# Patient Record
Sex: FEMALE | Race: OTHER | HISPANIC OR LATINO
[De-identification: names, ages, dates, MRNs, and addresses within clinical notes are randomized per-mention and may not be internally consistent; named-entity substitution may affect disease eponyms.]

---

## 2021-04-22 ENCOUNTER — APPOINTMENT (OUTPATIENT)
Dept: OBGYN | Facility: CLINIC | Age: 31
End: 2021-04-22
Payer: MEDICAID

## 2021-04-22 VITALS
DIASTOLIC BLOOD PRESSURE: 83 MMHG | BODY MASS INDEX: 35.87 KG/M2 | WEIGHT: 190 LBS | HEIGHT: 61 IN | SYSTOLIC BLOOD PRESSURE: 114 MMHG | HEART RATE: 92 BPM | TEMPERATURE: 97.9 F | OXYGEN SATURATION: 98 %

## 2021-04-22 DIAGNOSIS — Z82.49 FAMILY HISTORY OF ISCHEMIC HEART DISEASE AND OTHER DISEASES OF THE CIRCULATORY SYSTEM: ICD-10-CM

## 2021-04-22 DIAGNOSIS — N20.0 CALCULUS OF KIDNEY: ICD-10-CM

## 2021-04-22 DIAGNOSIS — K80.20 CALCULUS OF GALLBLADDER W/OUT CHOLECYSTITIS W/OUT OBSTRUCTION: ICD-10-CM

## 2021-04-22 DIAGNOSIS — Z78.9 OTHER SPECIFIED HEALTH STATUS: ICD-10-CM

## 2021-04-22 DIAGNOSIS — R10.2 PELVIC AND PERINEAL PAIN: ICD-10-CM

## 2021-04-22 PROCEDURE — 99072 ADDL SUPL MATRL&STAF TM PHE: CPT

## 2021-04-22 PROCEDURE — 99203 OFFICE O/P NEW LOW 30 MIN: CPT

## 2021-04-22 NOTE — HISTORY OF PRESENT ILLNESS
[Localized] : localized [TextBox_10] : pain inher abdomen radiates to back [Frequency: Q ___ days] : menstrual periods occur approximately every [unfilled] days [Menarche Age: ____] : age at menarche was [unfilled] [Menstrual Cramps] : menstrual cramps [FreeTextEntry1] : 04/13/2021 [Currently Active] : currently active [Men] : men [Vaginal] : vaginal [No] : No

## 2021-04-23 LAB
ANION GAP SERPL CALC-SCNC: 13 MMOL/L
APPEARANCE: CLEAR
BACTERIA: NEGATIVE
BASOPHILS # BLD AUTO: 0.08 K/UL
BASOPHILS NFR BLD AUTO: 0.7 %
BILIRUBIN URINE: NEGATIVE
BLOOD URINE: NORMAL
BUN SERPL-MCNC: 13 MG/DL
C TRACH RRNA SPEC QL NAA+PROBE: NOT DETECTED
CALCIUM SERPL-MCNC: 10.2 MG/DL
CANCER AG125 SERPL-ACNC: 46 U/ML
CEA SERPL-MCNC: 1.1 NG/ML
CHLORIDE SERPL-SCNC: 101 MMOL/L
CO2 SERPL-SCNC: 24 MMOL/L
COLOR: NORMAL
CREAT SERPL-MCNC: 0.85 MG/DL
DHEA-S SERPL-MCNC: 397 UG/DL
EOSINOPHIL # BLD AUTO: 0.19 K/UL
EOSINOPHIL NFR BLD AUTO: 1.6 %
FSH SERPL-MCNC: 5.2 IU/L
GLUCOSE QUALITATIVE U: NEGATIVE
GLUCOSE SERPL-MCNC: 84 MG/DL
HCT VFR BLD CALC: 42.1 %
HGB BLD-MCNC: 13.2 G/DL
HYALINE CASTS: 1 /LPF
IMM GRANULOCYTES NFR BLD AUTO: 0.3 %
KETONES URINE: NEGATIVE
LEUKOCYTE ESTERASE URINE: NEGATIVE
LH SERPL-ACNC: 6.4 IU/L
LYMPHOCYTES # BLD AUTO: 4.12 K/UL
LYMPHOCYTES NFR BLD AUTO: 35.5 %
MAN DIFF?: NORMAL
MCHC RBC-ENTMCNC: 28.2 PG
MCHC RBC-ENTMCNC: 31.4 GM/DL
MCV RBC AUTO: 90 FL
MICROSCOPIC-UA: NORMAL
MONOCYTES # BLD AUTO: 0.59 K/UL
MONOCYTES NFR BLD AUTO: 5.1 %
N GONORRHOEA RRNA SPEC QL NAA+PROBE: NOT DETECTED
NEUTROPHILS # BLD AUTO: 6.58 K/UL
NEUTROPHILS NFR BLD AUTO: 56.8 %
NITRITE URINE: NEGATIVE
PH URINE: 6
PLATELET # BLD AUTO: 388 K/UL
POTASSIUM SERPL-SCNC: 4.2 MMOL/L
PROLACTIN SERPL-MCNC: 8.2 NG/ML
PROTEIN URINE: NORMAL
RBC # BLD: 4.68 M/UL
RBC # FLD: 13.1 %
RED BLOOD CELLS URINE: 2 /HPF
SODIUM SERPL-SCNC: 138 MMOL/L
SOURCE TP AMPLIFICATION: NORMAL
SPECIFIC GRAVITY URINE: 1.02
SQUAMOUS EPITHELIAL CELLS: 2 /HPF
TSH SERPL-ACNC: 0.84 UIU/ML
UROBILINOGEN URINE: NORMAL
WBC # FLD AUTO: 11.6 K/UL
WHITE BLOOD CELLS URINE: 3 /HPF

## 2021-04-24 LAB — BACTERIA UR CULT: NORMAL

## 2021-04-26 DIAGNOSIS — J45.20 MILD INTERMITTENT ASTHMA, UNCOMPLICATED: ICD-10-CM

## 2021-04-26 RX ORDER — AMITRIPTYLINE HYDROCHLORIDE 25 MG/1
25 TABLET, FILM COATED ORAL
Qty: 30 | Refills: 0 | Status: COMPLETED | COMMUNITY
Start: 2021-03-03

## 2021-04-26 RX ORDER — SULFAMETHOXAZOLE AND TRIMETHOPRIM 800; 160 MG/1; MG/1
800-160 TABLET ORAL
Qty: 14 | Refills: 0 | Status: COMPLETED | COMMUNITY
Start: 2020-12-15

## 2021-04-26 RX ORDER — NORETHINDRONE 0.35 MG/1
0.35 TABLET ORAL
Qty: 28 | Refills: 0 | Status: COMPLETED | COMMUNITY
Start: 2021-03-03

## 2021-04-26 RX ORDER — HYDROCORTISONE 25 MG/G
2.5 CREAM TOPICAL
Qty: 30 | Refills: 0 | Status: COMPLETED | COMMUNITY
Start: 2021-01-20

## 2021-04-26 RX ORDER — CIPROFLOXACIN HYDROCHLORIDE 500 MG/1
500 TABLET, FILM COATED ORAL
Qty: 14 | Refills: 0 | Status: COMPLETED | COMMUNITY
Start: 2020-12-17

## 2021-04-28 LAB
CYTOLOGY CVX/VAG DOC THIN PREP: NORMAL
TESTOST BND SERPL-MCNC: 3.6 PG/ML
TESTOST SERPL-MCNC: 25.2 NG/DL

## 2021-05-07 ENCOUNTER — APPOINTMENT (OUTPATIENT)
Dept: ULTRASOUND IMAGING | Facility: HOSPITAL | Age: 31
End: 2021-05-07
Payer: MEDICAID

## 2021-05-07 ENCOUNTER — TRANSCRIPTION ENCOUNTER (OUTPATIENT)
Age: 31
End: 2021-05-07

## 2021-05-07 ENCOUNTER — OUTPATIENT (OUTPATIENT)
Dept: OUTPATIENT SERVICES | Facility: HOSPITAL | Age: 31
LOS: 1 days | End: 2021-05-07
Payer: MEDICAID

## 2021-05-07 DIAGNOSIS — R10.2 PELVIC AND PERINEAL PAIN: ICD-10-CM

## 2021-05-07 PROCEDURE — 76856 US EXAM PELVIC COMPLETE: CPT | Mod: 26

## 2021-05-07 PROCEDURE — 76830 TRANSVAGINAL US NON-OB: CPT

## 2021-05-07 PROCEDURE — 76856 US EXAM PELVIC COMPLETE: CPT

## 2021-05-07 PROCEDURE — 76830 TRANSVAGINAL US NON-OB: CPT | Mod: 26

## 2021-05-10 ENCOUNTER — TRANSCRIPTION ENCOUNTER (OUTPATIENT)
Age: 31
End: 2021-05-10

## 2021-05-12 ENCOUNTER — OUTPATIENT (OUTPATIENT)
Dept: OUTPATIENT SERVICES | Facility: HOSPITAL | Age: 31
LOS: 1 days | End: 2021-05-12
Payer: MEDICAID

## 2021-05-12 ENCOUNTER — APPOINTMENT (OUTPATIENT)
Dept: MRI IMAGING | Facility: HOSPITAL | Age: 31
End: 2021-05-12
Payer: MEDICAID

## 2021-05-12 DIAGNOSIS — N83.201 UNSPECIFIED OVARIAN CYST, RIGHT SIDE: ICD-10-CM

## 2021-05-12 PROCEDURE — 72197 MRI PELVIS W/O & W/DYE: CPT | Mod: 26

## 2021-05-12 PROCEDURE — 72197 MRI PELVIS W/O & W/DYE: CPT

## 2021-05-26 ENCOUNTER — APPOINTMENT (OUTPATIENT)
Dept: GYNECOLOGIC ONCOLOGY | Facility: CLINIC | Age: 31
End: 2021-05-26
Payer: MEDICAID

## 2021-05-26 VITALS
DIASTOLIC BLOOD PRESSURE: 84 MMHG | HEIGHT: 61 IN | HEART RATE: 92 BPM | WEIGHT: 198.25 LBS | OXYGEN SATURATION: 97 % | BODY MASS INDEX: 37.43 KG/M2 | SYSTOLIC BLOOD PRESSURE: 119 MMHG

## 2021-05-26 DIAGNOSIS — N83.299 OTHER OVARIAN CYST, UNSPECIFIED SIDE: ICD-10-CM

## 2021-05-26 DIAGNOSIS — Z87.898 PERSONAL HISTORY OF OTHER SPECIFIED CONDITIONS: ICD-10-CM

## 2021-05-26 DIAGNOSIS — Z80.8 FAMILY HISTORY OF MALIGNANT NEOPLASM OF OTHER ORGANS OR SYSTEMS: ICD-10-CM

## 2021-05-26 DIAGNOSIS — Z80.7 FAMILY HISTORY OF OTHER MALIGNANT NEOPLASMS OF LYMPHOID, HEMATOPOIETIC AND RELATED TISSUES: ICD-10-CM

## 2021-05-26 DIAGNOSIS — Z76.89 PERSONS ENCOUNTERING HEALTH SERVICES IN OTHER SPECIFIED CIRCUMSTANCES: ICD-10-CM

## 2021-05-26 DIAGNOSIS — Z86.69 PERSONAL HISTORY OF OTHER DISEASES OF THE NERVOUS SYSTEM AND SENSE ORGANS: ICD-10-CM

## 2021-05-26 PROCEDURE — 99205 OFFICE O/P NEW HI 60 MIN: CPT

## 2021-06-08 ENCOUNTER — OUTPATIENT (OUTPATIENT)
Dept: OUTPATIENT SERVICES | Facility: HOSPITAL | Age: 31
LOS: 1 days | End: 2021-06-08

## 2021-06-08 VITALS
HEART RATE: 90 BPM | WEIGHT: 199.96 LBS | HEIGHT: 61 IN | DIASTOLIC BLOOD PRESSURE: 88 MMHG | OXYGEN SATURATION: 99 % | TEMPERATURE: 99 F | SYSTOLIC BLOOD PRESSURE: 125 MMHG | RESPIRATION RATE: 16 BRPM

## 2021-06-08 DIAGNOSIS — N83.201 UNSPECIFIED OVARIAN CYST, RIGHT SIDE: ICD-10-CM

## 2021-06-08 LAB
ANION GAP SERPL CALC-SCNC: 11 MMOL/L — SIGNIFICANT CHANGE UP (ref 7–14)
BLD GP AB SCN SERPL QL: NEGATIVE — SIGNIFICANT CHANGE UP
BUN SERPL-MCNC: 11 MG/DL — SIGNIFICANT CHANGE UP (ref 7–23)
CALCIUM SERPL-MCNC: 9.3 MG/DL — SIGNIFICANT CHANGE UP (ref 8.4–10.5)
CHLORIDE SERPL-SCNC: 102 MMOL/L — SIGNIFICANT CHANGE UP (ref 98–107)
CO2 SERPL-SCNC: 26 MMOL/L — SIGNIFICANT CHANGE UP (ref 22–31)
CREAT SERPL-MCNC: 0.79 MG/DL — SIGNIFICANT CHANGE UP (ref 0.5–1.3)
GLUCOSE SERPL-MCNC: 69 MG/DL — LOW (ref 70–99)
HCG UR QL: NEGATIVE — SIGNIFICANT CHANGE UP
HCT VFR BLD CALC: 37.2 % — SIGNIFICANT CHANGE UP (ref 34.5–45)
HGB BLD-MCNC: 11.7 G/DL — SIGNIFICANT CHANGE UP (ref 11.5–15.5)
MCHC RBC-ENTMCNC: 27.8 PG — SIGNIFICANT CHANGE UP (ref 27–34)
MCHC RBC-ENTMCNC: 31.5 GM/DL — LOW (ref 32–36)
MCV RBC AUTO: 88.4 FL — SIGNIFICANT CHANGE UP (ref 80–100)
NRBC # BLD: 0 /100 WBCS — SIGNIFICANT CHANGE UP
NRBC # FLD: 0 K/UL — SIGNIFICANT CHANGE UP
PLATELET # BLD AUTO: 297 K/UL — SIGNIFICANT CHANGE UP (ref 150–400)
POTASSIUM SERPL-MCNC: 4 MMOL/L — SIGNIFICANT CHANGE UP (ref 3.5–5.3)
POTASSIUM SERPL-SCNC: 4 MMOL/L — SIGNIFICANT CHANGE UP (ref 3.5–5.3)
RBC # BLD: 4.21 M/UL — SIGNIFICANT CHANGE UP (ref 3.8–5.2)
RBC # FLD: 12.6 % — SIGNIFICANT CHANGE UP (ref 10.3–14.5)
RH IG SCN BLD-IMP: POSITIVE — SIGNIFICANT CHANGE UP
SODIUM SERPL-SCNC: 139 MMOL/L — SIGNIFICANT CHANGE UP (ref 135–145)
WBC # BLD: 8.61 K/UL — SIGNIFICANT CHANGE UP (ref 3.8–10.5)
WBC # FLD AUTO: 8.61 K/UL — SIGNIFICANT CHANGE UP (ref 3.8–10.5)

## 2021-06-08 NOTE — H&P PST ADULT - ATTENDING COMMENTS
patient seen and evaluated, plan for RA LSC Ovarian cystectomy, possible oophorectomy, possible staging , all indicated procedures  discussed risks including bleeding, infection, injury to bowel/bladder/vessels/nerves/ureters, risks of blood transfusion, reoperation, ICU admission  all questions answered

## 2021-06-08 NOTE — H&P PST ADULT - NSANTHOSAYNRD_GEN_A_CORE
No. WILLY screening performed.  STOP BANG Legend: 0-2 = LOW Risk; 3-4 = INTERMEDIATE Risk; 5-8 = HIGH Risk

## 2021-06-08 NOTE — H&P PST ADULT - NSICDXFAMILYHX_GEN_ALL_CORE_FT
FAMILY HISTORY:  Father  Still living? Unknown  FH: HTN (hypertension), Age at diagnosis: Age Unknown    Mother  Still living? Yes, Estimated age: Age Unknown  FH: HTN (hypertension), Age at diagnosis: Age Unknown    Sibling  Still living? Unknown  FH: sickle cell anemia, Age at diagnosis: Age Unknown

## 2021-06-08 NOTE — H&P PST ADULT - ASSESSMENT
Problem: unspecified ovarian cyst, right side   Assessment and Plan: Pt is tentatively scheduled for robotic assisted bilateral ovarian cystectomy possible unilateral salpingo oophorectomy staging for 6/15/21. Pre-op instructions provided. Pt given verbal and written instructions with teach back on chlorhexidine shampoo and pepcid. Urine cup provided for day of procedure pregnancy test.  Hospital visitation policy provided. Pt strongly advised to follow up with surgeon to discuss COVID testing requirements prior to procedure. Pt verbalized understanding with return demonstration.

## 2021-06-08 NOTE — H&P PST ADULT - HISTORY OF PRESENT ILLNESS
31 year old female presents to presurgical testing with diagnosis of unspecified ovarian cyst, right side scheduled for robotic assisted bilateral ovarian cystectomy possible unilateral salpingo oophorectomy staging. Pt with a right complex ovarian cyst and a left ovarian cyst, complaining of pelvic pain.

## 2021-06-14 ENCOUNTER — TRANSCRIPTION ENCOUNTER (OUTPATIENT)
Age: 31
End: 2021-06-14

## 2021-06-14 NOTE — ASU PATIENT PROFILE, ADULT - INTERNATIONAL TRAVEL
No Lydia Reynolds  CARDIOLOGY  [address] [place] [address]  Phone: [phone]  Fax: [phone]  Follow Up Time:     Tyrone Madrigal)  Gastroenterology; Internal Medicine  [address] [place] [address]  Phone: [phone]  Fax: [phone]  Follow Up Time:     Chip Layton  GASTROENTEROLOGY  [address] [place] [address]  Phone: [phone]  Fax: [phone]  Follow Up Time:

## 2021-06-15 ENCOUNTER — RESULT REVIEW (OUTPATIENT)
Age: 31
End: 2021-06-15

## 2021-06-15 ENCOUNTER — OUTPATIENT (OUTPATIENT)
Dept: OUTPATIENT SERVICES | Facility: HOSPITAL | Age: 31
LOS: 1 days | Discharge: HOME | End: 2021-06-15
Payer: MEDICAID

## 2021-06-15 ENCOUNTER — APPOINTMENT (OUTPATIENT)
Dept: GYNECOLOGIC ONCOLOGY | Facility: HOSPITAL | Age: 31
End: 2021-06-15

## 2021-06-15 VITALS
HEIGHT: 61 IN | DIASTOLIC BLOOD PRESSURE: 85 MMHG | SYSTOLIC BLOOD PRESSURE: 141 MMHG | OXYGEN SATURATION: 96 % | RESPIRATION RATE: 16 BRPM | HEART RATE: 89 BPM | TEMPERATURE: 98 F | WEIGHT: 199.96 LBS

## 2021-06-15 VITALS
OXYGEN SATURATION: 100 % | TEMPERATURE: 98 F | DIASTOLIC BLOOD PRESSURE: 82 MMHG | RESPIRATION RATE: 15 BRPM | SYSTOLIC BLOOD PRESSURE: 126 MMHG | HEART RATE: 98 BPM

## 2021-06-15 DIAGNOSIS — N83.201 UNSPECIFIED OVARIAN CYST, RIGHT SIDE: ICD-10-CM

## 2021-06-15 PROBLEM — J45.909 UNSPECIFIED ASTHMA, UNCOMPLICATED: Chronic | Status: ACTIVE | Noted: 2021-06-08

## 2021-06-15 PROBLEM — N20.0 CALCULUS OF KIDNEY: Chronic | Status: ACTIVE | Noted: 2021-06-08

## 2021-06-15 PROBLEM — R42 DIZZINESS AND GIDDINESS: Chronic | Status: ACTIVE | Noted: 2021-06-08

## 2021-06-15 PROBLEM — G43.909 MIGRAINE, UNSPECIFIED, NOT INTRACTABLE, WITHOUT STATUS MIGRAINOSUS: Chronic | Status: ACTIVE | Noted: 2021-06-08

## 2021-06-15 LAB — HCG UR QL: NEGATIVE — SIGNIFICANT CHANGE UP

## 2021-06-15 PROCEDURE — 58661 LAPAROSCOPY REMOVE ADNEXA: CPT | Mod: 22

## 2021-06-15 PROCEDURE — 88305 TISSUE EXAM BY PATHOLOGIST: CPT | Mod: 26

## 2021-06-15 PROCEDURE — 88305 TISSUE EXAM BY PATHOLOGIST: CPT | Mod: 26,59

## 2021-06-15 PROCEDURE — S2900 ROBOTIC SURGICAL SYSTEM: CPT | Mod: NC

## 2021-06-15 PROCEDURE — 88342 IMHCHEM/IMCYTCHM 1ST ANTB: CPT | Mod: 26

## 2021-06-15 PROCEDURE — 88341 IMHCHEM/IMCYTCHM EA ADD ANTB: CPT | Mod: 26

## 2021-06-15 PROCEDURE — 88112 CYTOPATH CELL ENHANCE TECH: CPT | Mod: 26

## 2021-06-15 RX ORDER — FENTANYL CITRATE 50 UG/ML
50 INJECTION INTRAVENOUS ONCE
Refills: 0 | Status: DISCONTINUED | OUTPATIENT
Start: 2021-06-15 | End: 2021-06-15

## 2021-06-15 RX ORDER — ONDANSETRON 8 MG/1
4 TABLET, FILM COATED ORAL ONCE
Refills: 0 | Status: DISCONTINUED | OUTPATIENT
Start: 2021-06-15 | End: 2021-06-29

## 2021-06-15 RX ORDER — SODIUM CHLORIDE 9 MG/ML
1000 INJECTION, SOLUTION INTRAVENOUS
Refills: 0 | Status: DISCONTINUED | OUTPATIENT
Start: 2021-06-15 | End: 2021-06-15

## 2021-06-15 RX ORDER — OXYCODONE HYDROCHLORIDE 5 MG/1
5 TABLET ORAL ONCE
Refills: 0 | Status: DISCONTINUED | OUTPATIENT
Start: 2021-06-15 | End: 2021-06-15

## 2021-06-15 RX ORDER — OXYCODONE HYDROCHLORIDE 5 MG/1
1 TABLET ORAL
Qty: 5 | Refills: 0
Start: 2021-06-15

## 2021-06-15 RX ORDER — SODIUM CHLORIDE 9 MG/ML
1000 INJECTION, SOLUTION INTRAVENOUS
Refills: 0 | Status: DISCONTINUED | OUTPATIENT
Start: 2021-06-15 | End: 2021-06-29

## 2021-06-15 RX ORDER — FENTANYL CITRATE 50 UG/ML
25 INJECTION INTRAVENOUS
Refills: 0 | Status: DISCONTINUED | OUTPATIENT
Start: 2021-06-15 | End: 2021-06-15

## 2021-06-15 RX ADMIN — OXYCODONE HYDROCHLORIDE 5 MILLIGRAM(S): 5 TABLET ORAL at 17:20

## 2021-06-15 RX ADMIN — SODIUM CHLORIDE 125 MILLILITER(S): 9 INJECTION, SOLUTION INTRAVENOUS at 16:20

## 2021-06-15 RX ADMIN — FENTANYL CITRATE 50 MICROGRAM(S): 50 INJECTION INTRAVENOUS at 17:10

## 2021-06-15 RX ADMIN — FENTANYL CITRATE 50 MICROGRAM(S): 50 INJECTION INTRAVENOUS at 16:55

## 2021-06-15 RX ADMIN — OXYCODONE HYDROCHLORIDE 5 MILLIGRAM(S): 5 TABLET ORAL at 17:50

## 2021-06-15 NOTE — PROGRESS NOTE ADULT - SUBJECTIVE AND OBJECTIVE BOX
PA POST-OP CHECK GYN ONCOLOGY NOTE    Patient seen and examined at bedside in PACU. Patient awake and resting comfortably. Reports pain is under control at this time with current regimen. Otherwise doing well. Patient denies fever, chills, chest pain, SOB, nausea, vomiting, lightheadedness, and dizziness.      T(F): 98.2 (06-15-21 @ 18:00), Max: 98.2 (06-15-21 @ 18:00)  HR: 90 (06-15-21 @ 18:15) (88 - 101)  BP: 112/67 (06-15-21 @ 18:15) (107/68 - 141/85)  RR: 18 (06-15-21 @ 18:15) (11 - 20)  SpO2: 96% (06-15-21 @ 18:15) (93% - 100%)      I&O's Detail    15 Luis Manuel 2021 07:01  -  15 Luis Manuel 2021 18:26  --------------------------------------------------------  IN:    Lactated Ringers: 375 mL    Oral Fluid: 240 mL  Total IN: 615 mL    OUT:  Total OUT: 0 mL    Total NET: 615 mL      Physical Exam:  General: NAD  Chest: CTA b/l  Heart: s1s2  Abdomen: Soft, appropriately tender to palpation  Incisional site: clean, dry, intact   Extremities: No swelling or calf tenderness bilaterally      MEDICATIONS  (STANDING):  lactated ringers. 1000 milliLiter(s) (125 mL/Hr) IV Continuous <Continuous>    MEDICATIONS  (PRN):  fentaNYL    Injectable 25 MICROGram(s) IV Push every 5 minutes PRN Moderate Pain (4 - 6)  ondansetron Injectable 4 milliGRAM(s) IV Push once PRN Nausea and/or Vomiting      A/P: This is a 31y female, POD#0, s/p RA LSO and right cystectomy. Patient is stable and doing well post operatively.    Plan:  DTV by 11:30p  IV Hydration with LR at 125mL/hr  Pain management as needed  Encourage incentive spirometer use  Clears liquid diet and advance diet as tolerated  Out of bed with assist as tolerated  Evaluate for discharge when tolerates diet, voids, ambulates, and VSS    Patient's case and post-op condition discussed with Gyn/Onc team. Above plans as per our discussion.  Spectra #[id number]

## 2021-06-15 NOTE — ASU DISCHARGE PLAN (ADULT/PEDIATRIC) - CARE PROVIDER_API CALL
Magalys Fraire  GYNECOLOGIC ONCOLOGY  [address] [place] [address]  Phone: [phone]  Fax: [phone]  Follow Up Time:

## 2021-06-15 NOTE — ASU DISCHARGE PLAN (ADULT/PEDIATRIC) - ASU DC SPECIAL INSTRUCTIONSFT
Postoperative Instructions      Pain control     For pain control, take the followin. Motrin 600mg every 6 hours, take with food  2. Add Tylenol 975 every 6 hours, alternated with Motrin  3. Oxycodone 5mg every 6 hours as needed for severe pain     Motrin and Tylenol can be obtained over the counter.         Postoperative restrictions   Do not drive or make important decisions for 24 hours after anesthesia. No lifting anything heavier than 15 lbs, no strenuous exercise until cleared by Dr. Fraire.       Vaginal bleeding   Spotting per vagina is normal in first few day after surgery. If you are soaking 1 pad per hour, that is not normal and you should notify Dr. Fraire's office and seek medical attention right away.      Signs of Infection  Call the office and/or come to the emergency room for any of the following: foul smelling vaginal discharge, fever over 100.4F, abdominal pain or cramping that does not get better with over the counter medications, nausea/vomiting (especially if you become unable to tolerate oral intake), inability to urinate. Any of these could be signs that you may be developing an infection requiring antibiotics.      Follow Up  Please follow-up with Dr Fraire on 2021 at 10:30 am.

## 2021-06-15 NOTE — ASU DISCHARGE PLAN (ADULT/PEDIATRIC) - NURSING INSTRUCTIONS
DO NOT take any Tylenol (Acetaminophen) or narcotics containing Tylenol until after  9:30pm . You received Tylenol during your operation and it can cause damage to your liver if too much is taken within a 24 hour time period. No Ibuprofen/ Motrin/ Advil until after 9:45pm.

## 2021-06-15 NOTE — BRIEF OPERATIVE NOTE - NSICDXBRIEFPROCEDURE_GEN_ALL_CORE_FT
PROCEDURES:  Salpingoopherectomy, laparoscopic, robot-assisted 15-Luis Manuel-2021 16:17:50  Jessi Fisher  Robot-assisted laparoscopic right ovarian cystectomy 15-Luis Manuel-2021 16:18:18  Jessi Fisher

## 2021-06-17 LAB — SURGICAL PATHOLOGY STUDY: SIGNIFICANT CHANGE UP

## 2021-06-21 ENCOUNTER — NON-APPOINTMENT (OUTPATIENT)
Age: 31
End: 2021-06-21

## 2021-06-22 ENCOUNTER — APPOINTMENT (OUTPATIENT)
Dept: HUMAN REPRODUCTION | Facility: CLINIC | Age: 31
End: 2021-06-22
Payer: COMMERCIAL

## 2021-06-22 LAB — NON-GYNECOLOGICAL CYTOLOGY STUDY: SIGNIFICANT CHANGE UP

## 2021-06-22 PROCEDURE — 99202 OFFICE O/P NEW SF 15 MIN: CPT | Mod: NC,95

## 2021-06-23 ENCOUNTER — APPOINTMENT (OUTPATIENT)
Dept: GYNECOLOGIC ONCOLOGY | Facility: CLINIC | Age: 31
End: 2021-06-23
Payer: MEDICAID

## 2021-06-23 ENCOUNTER — NON-APPOINTMENT (OUTPATIENT)
Age: 31
End: 2021-06-23

## 2021-06-23 VITALS
DIASTOLIC BLOOD PRESSURE: 83 MMHG | SYSTOLIC BLOOD PRESSURE: 118 MMHG | TEMPERATURE: 97.8 F | HEART RATE: 109 BPM | HEIGHT: 61 IN | BODY MASS INDEX: 3.79 KG/M2 | WEIGHT: 20.06 LBS

## 2021-06-23 DIAGNOSIS — Z71.89 OTHER SPECIFIED COUNSELING: ICD-10-CM

## 2021-06-23 PROCEDURE — 99215 OFFICE O/P EST HI 40 MIN: CPT | Mod: 24

## 2021-06-23 RX ORDER — AZELASTINE HYDROCHLORIDE 137 UG/1
0.1 SPRAY, METERED NASAL
Qty: 30 | Refills: 0 | Status: DISCONTINUED | COMMUNITY
Start: 2021-03-26 | End: 2021-06-23

## 2021-06-23 RX ORDER — FLUTICASONE PROPIONATE 50 MCG
50 SPRAY, SUSPENSION NASAL
Refills: 0 | Status: DISCONTINUED | COMMUNITY
End: 2021-06-23

## 2021-06-23 RX ORDER — METFORMIN HYDROCHLORIDE 625 MG/1
TABLET ORAL
Refills: 0 | Status: DISCONTINUED | COMMUNITY
End: 2021-06-23

## 2021-06-23 NOTE — ASSESSMENT
[FreeTextEntry1] : Discussed pathology results with patient, boyfriend, and dad presenting today. 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 Additionally, had input from CARLIE, Med Onc, Gyn Onc at [facility] system conference. Patient has low grade ovarian cancer, with + spread to omentum based on intraop findigns and + findings on the uterus, peritoneum, with some components indicated borderline/noninvasive components. Given findings, recommend surgical debulking surgery- which is c/w completion hysterectomy, RSO, prior LSO and omentectomy, debulking of disease tissues. Patient and family understand. Per Dr. Ceja - given advanced disease and low grade tumor b/l, including the residual right ovary, oocyte preservation is not an option. Stimulation is not an option and neither is ovarian conservation - standard management is BSO - especially since this residual right ovary is c/w low grade tumor as well. Unanimous  recommendation to proceed with completion surgery/hysterectomy RSO\par patient and family understand. She is willing to consider adoption in the future, she understands also risks of menopause at this age, heart health, bone health.\par We discussed need for future chemotherapy after surgery.\par Patient is aware and would like to proceed with surgery\par She inquired about need for 2nd opinion. I encouraged her to have a 2nd opinion if she feels that is most comfortable for her - especially given our plan for sterility\par At this time, surgery is scheduled for 7/6\par \par Planned for exploratory laparotomy, total abdominal hysterectomy, Right salpingo-oophorectomy, possible lymph node debulking, omentectomy, possible bowel resection/reanastomosis, debulking, all indicated procedures\par \par Risks of surgery discussed including bleeding, infection, injury to bowel/bladder/vessels/nerves/ureters/ risk of blood transfusion - pt accepts blood, risk of ICU admission, reoperation, anesthesia risk\par \par Benefits including staging, treatment discussed with patient, including possible need to abort surgery if debulking cannot be achieved\par Patient aware that she may need further treatment including chemotherapy \par All questions answered\par \par PST/Covid vaccinated\par CT tomorrow C/A/P\par \par

## 2021-06-23 NOTE — REASON FOR VISIT
[Post Op] : post op visit [de-identified] : 6/15/21 [de-identified] : RA LSO, R cystectomy, peritoneal biopsies [de-identified] : Patient doing well, no n/v/cp/sob, eric reg diet, voiding freely + BMs\par Presents to discuss pathology results and next steps.

## 2021-06-23 NOTE — DISCUSSION/SUMMARY
[Clean] : was clean [Dry] : was dry [Intact] : was intact [Ecchymosis] : was not ecchymotic [Erythema] : was not erythematous [Seroma] : had no seroma [None] : had no drainage [Normal Skin] : normal appearance [Firm] : soft [Tender] : nontender [Abnormal Bowel Sounds] : normal bowel sounds [Rebound] : no rebound tenderness [Guarding] : no guarding [Incisional Hernia] : no incisional hernia [Mass] : no palpable mass

## 2021-06-24 ENCOUNTER — RESULT REVIEW (OUTPATIENT)
Age: 31
End: 2021-06-24

## 2021-06-24 ENCOUNTER — OUTPATIENT (OUTPATIENT)
Dept: OUTPATIENT SERVICES | Facility: HOSPITAL | Age: 31
LOS: 1 days | End: 2021-06-24

## 2021-06-24 ENCOUNTER — APPOINTMENT (OUTPATIENT)
Dept: CT IMAGING | Facility: CLINIC | Age: 31
End: 2021-06-24
Payer: MEDICAID

## 2021-06-24 PROCEDURE — 74177 CT ABD & PELVIS W/CONTRAST: CPT | Mod: 26

## 2021-06-24 PROCEDURE — 71260 CT THORAX DX C+: CPT | Mod: 26

## 2021-07-01 ENCOUNTER — NON-APPOINTMENT (OUTPATIENT)
Age: 31
End: 2021-07-01

## 2021-07-04 NOTE — ASU PATIENT PROFILE, ADULT - ANESTHESIA, PREVIOUS REACTION, PROFILE
Denies family Hx of malignant hyperthermia/unknown Denies family Hx of malignant hyperthermia/none/unknown

## 2021-07-05 ENCOUNTER — TRANSCRIPTION ENCOUNTER (OUTPATIENT)
Age: 31
End: 2021-07-05

## 2021-07-06 ENCOUNTER — INPATIENT (INPATIENT)
Facility: HOSPITAL | Age: 31
LOS: 2 days | Discharge: HOME | End: 2021-07-09
Attending: OBSTETRICS & GYNECOLOGY | Admitting: OBSTETRICS & GYNECOLOGY
Payer: MEDICAID

## 2021-07-06 ENCOUNTER — NON-APPOINTMENT (OUTPATIENT)
Age: 31
End: 2021-07-06

## 2021-07-06 ENCOUNTER — RESULT REVIEW (OUTPATIENT)
Age: 31
End: 2021-07-06

## 2021-07-06 ENCOUNTER — APPOINTMENT (OUTPATIENT)
Dept: GYNECOLOGIC ONCOLOGY | Facility: HOSPITAL | Age: 31
End: 2021-07-06

## 2021-07-06 VITALS
WEIGHT: 199.96 LBS | RESPIRATION RATE: 16 BRPM | HEART RATE: 86 BPM | OXYGEN SATURATION: 97 % | TEMPERATURE: 98 F | HEIGHT: 61 IN | DIASTOLIC BLOOD PRESSURE: 69 MMHG | SYSTOLIC BLOOD PRESSURE: 118 MMHG

## 2021-07-06 DIAGNOSIS — C56.1 MALIGNANT NEOPLASM OF RIGHT OVARY: ICD-10-CM

## 2021-07-06 LAB
GLUCOSE BLDC GLUCOMTR-MCNC: 84 MG/DL — SIGNIFICANT CHANGE UP (ref 70–99)
HCG UR QL: NEGATIVE — SIGNIFICANT CHANGE UP

## 2021-07-06 PROCEDURE — 88305 TISSUE EXAM BY PATHOLOGIST: CPT | Mod: 26

## 2021-07-06 PROCEDURE — 88360 TUMOR IMMUNOHISTOCHEM/MANUAL: CPT | Mod: 26

## 2021-07-06 PROCEDURE — 58953 TAH RAD DISSECT FOR DEBULK: CPT

## 2021-07-06 PROCEDURE — 88307 TISSUE EXAM BY PATHOLOGIST: CPT | Mod: 26

## 2021-07-06 RX ORDER — KETOROLAC TROMETHAMINE 30 MG/ML
15 SYRINGE (ML) INJECTION EVERY 6 HOURS
Refills: 0 | Status: DISCONTINUED | OUTPATIENT
Start: 2021-07-06 | End: 2021-07-07

## 2021-07-06 RX ORDER — SIMETHICONE 80 MG/1
80 TABLET, CHEWABLE ORAL EVERY 6 HOURS
Refills: 0 | Status: DISCONTINUED | OUTPATIENT
Start: 2021-07-06 | End: 2021-07-09

## 2021-07-06 RX ORDER — HYDROMORPHONE HYDROCHLORIDE 2 MG/ML
0.5 INJECTION INTRAMUSCULAR; INTRAVENOUS; SUBCUTANEOUS
Refills: 0 | Status: DISCONTINUED | OUTPATIENT
Start: 2021-07-06 | End: 2021-07-06

## 2021-07-06 RX ORDER — ACETAMINOPHEN 500 MG
1000 TABLET ORAL ONCE
Refills: 0 | Status: COMPLETED | OUTPATIENT
Start: 2021-07-06 | End: 2021-07-06

## 2021-07-06 RX ORDER — HYDROMORPHONE HYDROCHLORIDE 2 MG/ML
30 INJECTION INTRAMUSCULAR; INTRAVENOUS; SUBCUTANEOUS
Refills: 0 | Status: DISCONTINUED | OUTPATIENT
Start: 2021-07-06 | End: 2021-07-07

## 2021-07-06 RX ORDER — CEFEPIME 1 G/1
2000 INJECTION, POWDER, FOR SOLUTION INTRAMUSCULAR; INTRAVENOUS ONCE
Refills: 0 | Status: COMPLETED | OUTPATIENT
Start: 2021-07-06 | End: 2021-07-06

## 2021-07-06 RX ORDER — ONDANSETRON 8 MG/1
8 TABLET, FILM COATED ORAL EVERY 8 HOURS
Refills: 0 | Status: DISCONTINUED | OUTPATIENT
Start: 2021-07-06 | End: 2021-07-06

## 2021-07-06 RX ORDER — CHLORHEXIDINE GLUCONATE 213 G/1000ML
1 SOLUTION TOPICAL ONCE
Refills: 0 | Status: COMPLETED | OUTPATIENT
Start: 2021-07-06 | End: 2021-07-06

## 2021-07-06 RX ORDER — ACETAMINOPHEN 500 MG
1000 TABLET ORAL ONCE
Refills: 0 | Status: COMPLETED | OUTPATIENT
Start: 2021-07-07 | End: 2021-07-07

## 2021-07-06 RX ORDER — SODIUM CHLORIDE 9 MG/ML
1000 INJECTION, SOLUTION INTRAVENOUS
Refills: 0 | Status: DISCONTINUED | OUTPATIENT
Start: 2021-07-06 | End: 2021-07-07

## 2021-07-06 RX ORDER — SODIUM CHLORIDE 9 MG/ML
1000 INJECTION, SOLUTION INTRAVENOUS
Refills: 0 | Status: DISCONTINUED | OUTPATIENT
Start: 2021-07-06 | End: 2021-07-06

## 2021-07-06 RX ORDER — ONDANSETRON 8 MG/1
4 TABLET, FILM COATED ORAL EVERY 6 HOURS
Refills: 0 | Status: DISCONTINUED | OUTPATIENT
Start: 2021-07-06 | End: 2021-07-09

## 2021-07-06 RX ORDER — ONDANSETRON 8 MG/1
4 TABLET, FILM COATED ORAL
Refills: 0 | Status: DISCONTINUED | OUTPATIENT
Start: 2021-07-06 | End: 2021-07-06

## 2021-07-06 RX ORDER — HYDROMORPHONE HYDROCHLORIDE 2 MG/ML
0.5 INJECTION INTRAMUSCULAR; INTRAVENOUS; SUBCUTANEOUS
Refills: 0 | Status: DISCONTINUED | OUTPATIENT
Start: 2021-07-06 | End: 2021-07-07

## 2021-07-06 RX ORDER — HEPARIN SODIUM 5000 [USP'U]/ML
5000 INJECTION INTRAVENOUS; SUBCUTANEOUS EVERY 8 HOURS
Refills: 0 | Status: DISCONTINUED | OUTPATIENT
Start: 2021-07-06 | End: 2021-07-07

## 2021-07-06 RX ORDER — NALOXONE HYDROCHLORIDE 4 MG/.1ML
0.1 SPRAY NASAL
Refills: 0 | Status: DISCONTINUED | OUTPATIENT
Start: 2021-07-06 | End: 2021-07-09

## 2021-07-06 RX ADMIN — HYDROMORPHONE HYDROCHLORIDE 30 MILLILITER(S): 2 INJECTION INTRAMUSCULAR; INTRAVENOUS; SUBCUTANEOUS at 20:11

## 2021-07-06 RX ADMIN — HYDROMORPHONE HYDROCHLORIDE 30 MILLILITER(S): 2 INJECTION INTRAMUSCULAR; INTRAVENOUS; SUBCUTANEOUS at 18:24

## 2021-07-06 RX ADMIN — Medication 400 MILLIGRAM(S): at 19:49

## 2021-07-06 RX ADMIN — HEPARIN SODIUM 5000 UNIT(S): 5000 INJECTION INTRAVENOUS; SUBCUTANEOUS at 19:49

## 2021-07-06 RX ADMIN — SODIUM CHLORIDE 30 MILLILITER(S): 9 INJECTION, SOLUTION INTRAVENOUS at 10:24

## 2021-07-06 RX ADMIN — CHLORHEXIDINE GLUCONATE 1 APPLICATION(S): 213 SOLUTION TOPICAL at 10:24

## 2021-07-06 RX ADMIN — HYDROMORPHONE HYDROCHLORIDE 0.5 MILLIGRAM(S): 2 INJECTION INTRAMUSCULAR; INTRAVENOUS; SUBCUTANEOUS at 17:58

## 2021-07-06 RX ADMIN — HYDROMORPHONE HYDROCHLORIDE 0.5 MILLIGRAM(S): 2 INJECTION INTRAMUSCULAR; INTRAVENOUS; SUBCUTANEOUS at 17:17

## 2021-07-06 RX ADMIN — Medication 15 MILLIGRAM(S): at 23:00

## 2021-07-06 NOTE — BRIEF OPERATIVE NOTE - OPERATION/FINDINGS
normal external female genitalia, normal external female genitalia, small mobile anteverted uterus. No adnexal masses appreciated. RV septum smooth. Disease present on R ovary, uterine serosa, omentum, left pelvic sidewall at the pelvic brim. There was a nodule on the sigmoid colon. CUSA ablation of left pelvic sidewall and sigmoid lesions. Small bowel ran with no evidence of disease. Liver and diaphragms examined and no disease present. Optimal tumor debulking.

## 2021-07-06 NOTE — BRIEF OPERATIVE NOTE - NSICDXBRIEFPROCEDURE_GEN_ALL_CORE_FT
PROCEDURES:  Total abdominal hysterectomy with debulking of neoplasm 06-Jul-2021 15:58:37  Helen Gage  Omentectomy, open 06-Jul-2021 15:58:57  Helen Gage  Unilateral salpingoophorectomy 06-Jul-2021 15:59:47  Helen Gage

## 2021-07-06 NOTE — CHART NOTE - NSCHARTNOTEFT_GEN_A_CORE
Plan discussed with patient and boyfriend  plan for EUA, Exlap/LINCOLN/USO, omentectomy, tumor debulking, possible bowel resection,a ll indicated procedures  discussed risks including bleeding, infection, injury to bowel/bladder/vessels/nerves/ureters, risks of blood transfusion, reoperation, ICU admission  accepts blood transfusion    Magalys Fraire MD

## 2021-07-06 NOTE — PROGRESS NOTE ADULT - SUBJECTIVE AND OBJECTIVE BOX
PA GynOnc Post Op Note    Pt seen and examined at bedside resting comfortably.  Pt denies fever, chills, chest pain, SOB, nausea, vomiting, lightheadedness, dizziness.  Mccrary catheter in place.  PCA for pain control with push button in left hand    T(F): 98.4 (07-06-21 @ 21:00), Max: 98.4 (07-06-21 @ 21:00)  HR: 95 (07-06-21 @ 21:00) (86 - 103)  BP: 108/72 (07-06-21 @ 21:00) (108/72 - 122/69)  RR: 15 (07-06-21 @ 21:00) (14 - 23)  SpO2: 99% (07-06-21 @ 21:00) (93% - 99%)  Wt(kg): --  I&O's Detail    06 Jul 2021 07:01  -  06 Jul 2021 23:49  --------------------------------------------------------  IN:    Lactated Ringers: 125 mL  Total IN: 125 mL    OUT:    Indwelling Catheter - Urethral (mL): 225 mL  Total OUT: 225 mL    Total NET: -100 mL    Physical Exam:  Constitutional: WDWN female, NAD AxOx3  Skin: warm and dry, no breakdowns noted  Chest: s1s2+, RRR, clear to auscultation bilaterally, no w/r/r    Abdomen: soft, nondistended, no guarding, no rebound, hypoactive bowel sounds,  Appropriate tenderness noted   Incisional site:  vertical dressing clean and dry. Abdominal binder in place   Extremities: no lower extremity edema or calf tenderness bilaterally; intermittent compression stockings in place       a/p: This 31y female, s/p ExLap, LINCOLN, RSO, Omentectomy, optimal debulking for known ovarian cancer, EBL: 300cc, pt stable    CV: hemodynamically stable  PUL: adequate on RA  GI: tolerating small sips of fluids   : Mccrary in place with good output and clear urine noted in bag  ID: afebrile, WBC stable, labs ordered for am  DVT prophylaxis: SQ Heparin intraop to continue q8hrs  Pain Management: controlled with PCA  continue IV Fluids  d/w gyn onc team    Gail Tovar, PAC  #[id number] spectra

## 2021-07-06 NOTE — ASU PREOP CHECKLIST - WARM FLUIDS/WARM BLANKETS
Chief complaint:   Chief Complaint   Patient presents with   • Prenatal Care       Vitals:  Visit Vitals  BP 98/58 (BP Location: [facility], Patient Position: Sitting)   Pulse 88   Ht 5' 1\" (1.549 m)   Wt 44.8 kg   LMP 10/07/2018   BMI 18.67 kg/m²       HISTORY OF PRESENT ILLNESS     CC: NEW OB    24 year old female  at 9w2d by LMP.  ARMEN (Estimated date of delivery) 2019.   BHARGAVI signed for u/s at [facility] done at about 6 wks gestation by her report.         Prior pregnancy induced at 37 wks for IUGR and oligohydramnios, will plan growth scans this pregnancy  Normal pap 17.   CF screen negative 10/24/13.  Known hemoglobin C trait.  I spoke with FOB Antonio Montes on the phone, and reports that he was tested before for sickle trait and C and was negative. Declines repeat testing.    Rh negative.   Had a first trimester u/s done at [facility] --BHARGAVI signed for that.  Was diagnosed with trichomonas and gonorrhea 3 wks ago at [facility]--was treated.   Patient's last menstrual period was 10/07/2018.   Plans to continue her PN care with Dr Parr, saw her last pregnancy.  Declined flu shot.  Explained rationale for flu shot and she declines.        Other significant problems:  Patient Active Problem List    Diagnosis Date Noted   • History of prior pregnancy with IUGR  2018     Priority: Low   • Supervision of other normal pregnancy, antepartum 2018     Priority: Low   • HSV infection 2017     Priority: Low   • Rh negative state in antepartum period 2013     Priority: Low   • Hemoglobin C trait (CMS/[facility]) 2013     Priority: Low     FOB to be tested.     • Other acne 2013     Priority: Low   • Persistent headaches 2013     Priority: Low     With pregnancy           PAST MEDICAL, FAMILY AND SOCIAL HISTORY     Medications:  Current Outpatient Medications   Medication   • Prenatal Vit-Fe Fumarate-FA (MULTIVITAMIN & MINERAL W/FOLIC ACID- PRENATAL) 27-1 MG Tab   • valACYclovir  (VALTREX) 500 MG tablet   • valACYclovir (VALTREX) 500 MG tablet     No current facility-administered medications for this visit.        Allergies:  ALLERGIES:  No Known Allergies    Past Medical  History/Surgeries:  Past Medical History:   Diagnosis Date   • Acne    • Anemia, unspecified    • Blood type, Rh negative    • Gonorrhea    • Hemoglobin C trait (CMS/HCC)    • HSV (herpes simplex virus) infection    • Sinusitis chronic   • Trichomonas contact, treated        Past Surgical History:   Procedure Laterality Date   •  procedures         Family History:  Family History   Problem Relation Age of Onset   • High blood pressure Mother        Social History:  Social History     Tobacco Use   • Smoking status: Never Smoker   • Smokeless tobacco: Never Used   Substance Use Topics   • Alcohol use: No     Alcohol/week: 0.0 oz     Frequency: Never       REVIEW OF SYSTEMS     Review of Systems   Constitutional: Negative.    HENT: Negative.    Eyes: Negative.    Respiratory: Negative.    Cardiovascular: Negative.    Gastrointestinal: Negative.    Endocrine: Negative.    Genitourinary: Negative.    Musculoskeletal: Negative.    Skin: Negative.    Allergic/Immunologic: Negative.    Neurological: Negative.    Hematological: Negative.    Psychiatric/Behavioral: Negative.        PHYSICAL EXAM     Visit Vitals  BP 98/58 (BP Location: [facility], Patient Position: Sitting)   Pulse 88   Ht 5' 1\" (1.549 m)   Wt 44.8 kg   LMP 10/07/2018   BMI 18.67 kg/m²     Physical Exam   Constitutional: She is oriented to person, place, and time.   Thin black female, NAD   HENT:   Head: Normocephalic and atraumatic.   Eyes: Conjunctivae are normal. Pupils are equal, round, and reactive to light.   Neck: Normal range of motion. Neck supple. No tracheal deviation present. No thyromegaly present.   Cardiovascular: Normal rate, regular rhythm and normal heart sounds. Exam reveals no friction rub.   No murmur heard.  Pulmonary/Chest: Effort normal and  breath sounds normal. No respiratory distress. She has no wheezes. She has no rales.   Breasts are symmetric and not tender. No nipple discharge or retraction, no dimpling, no mass. No axillary lymphadenopathy.   Abdominal: Soft. Bowel sounds are normal. She exhibits no distension and no mass. There is no tenderness. There is no rebound and no guarding.   No hernia or hepatosplenomegaly   Genitourinary:   Genitourinary Comments: Pelvic exam reveals normal external genitalia. The urethra and meatus are normal. The vagina is well folded. The cervix has no gross lesions. There is no mucopus. There is no cervical motion tenderness. Bimanual exam revealed a 8 week size freely mobile, nontender uterus with no palpable adnexal masses or tenderness. Anus and perineum are normal. No inguinal lymphadenopathy   Musculoskeletal: Normal range of motion. She exhibits no edema.   Neurological: She is alert and oriented to person, place, and time.   Skin: Skin is warm and dry.   Psychiatric: She has a normal mood and affect. Her behavior is normal. Judgment and thought content normal.     Bedside u/s today--single live IUP.  bpm.  CRL 2.18cm with AUA 8 6/7 wks gestation.     ASSESSMENT/PLAN     24 year old female  at 9w2d by LMP.  ARMEN (Estimated date of delivery) 2019.    Bedside u/s today gave CRL 2.18cm with AUA 8 6/7 wks gestation.     Prenatal care: blood type B negative. To the lab for the remainder of her blood tests.  GC, chlam, trich and urine culture today  Neg CF screen 10/24/13  Normal pap 17  Declined flu shot 18  Will start PNV.  BHARGAVI signed for 6 wk u/s at [facility] as well as testing that showed that she had gonorrhea and trichomonas 3 wks ago.   First trimester screen planned and ordered.   Anatomy survey ordered.     Hemoglobin C trait:  FOB Antonio Montes reports that he was tested and is negative, declines repeat testing.    Herpes: this is confidential.  She will start her valtrex 500mg po  BID, script sent. She reports that she never had an outbreak and it was picked up on blood work. In 2017, HSV 1 and 2 IgG positive.  Will add HSV glycoprotein testing to blood work today.     Hx of IUGR and oligohydramnios G1: plan growth scans    Rh negative: plan rhogam at 28 wks gestation.     Time of visit 60 min with >50% in counseling regarding her PMHx  And plan of care this pregnancy.   RTO 4 wks.  Tiara Jensen MD    no

## 2021-07-07 ENCOUNTER — TRANSCRIPTION ENCOUNTER (OUTPATIENT)
Age: 31
End: 2021-07-07

## 2021-07-07 DIAGNOSIS — Z98.890 OTHER SPECIFIED POSTPROCEDURAL STATES: ICD-10-CM

## 2021-07-07 LAB
ANION GAP SERPL CALC-SCNC: 10 MMOL/L — SIGNIFICANT CHANGE UP (ref 7–14)
BASOPHILS # BLD AUTO: 0.02 K/UL — SIGNIFICANT CHANGE UP (ref 0–0.2)
BASOPHILS NFR BLD AUTO: 0.2 % — SIGNIFICANT CHANGE UP (ref 0–2)
BUN SERPL-MCNC: 7 MG/DL — SIGNIFICANT CHANGE UP (ref 7–23)
CALCIUM SERPL-MCNC: 9.1 MG/DL — SIGNIFICANT CHANGE UP (ref 8.4–10.5)
CHLORIDE SERPL-SCNC: 103 MMOL/L — SIGNIFICANT CHANGE UP (ref 98–107)
CO2 SERPL-SCNC: 23 MMOL/L — SIGNIFICANT CHANGE UP (ref 22–31)
CREAT SERPL-MCNC: 0.72 MG/DL — SIGNIFICANT CHANGE UP (ref 0.5–1.3)
EOSINOPHIL # BLD AUTO: 0 K/UL — SIGNIFICANT CHANGE UP (ref 0–0.5)
EOSINOPHIL NFR BLD AUTO: 0 % — SIGNIFICANT CHANGE UP (ref 0–6)
GLUCOSE SERPL-MCNC: 100 MG/DL — HIGH (ref 70–99)
HCT VFR BLD CALC: 32.3 % — LOW (ref 34.5–45)
HGB BLD-MCNC: 10.3 G/DL — LOW (ref 11.5–15.5)
IANC: 8.25 K/UL — SIGNIFICANT CHANGE UP (ref 1.5–8.5)
IMM GRANULOCYTES NFR BLD AUTO: 0.4 % — SIGNIFICANT CHANGE UP (ref 0–1.5)
LYMPHOCYTES # BLD AUTO: 17.8 % — SIGNIFICANT CHANGE UP (ref 13–44)
LYMPHOCYTES # BLD AUTO: 2 K/UL — SIGNIFICANT CHANGE UP (ref 1–3.3)
MAGNESIUM SERPL-MCNC: 1.9 MG/DL — SIGNIFICANT CHANGE UP (ref 1.6–2.6)
MCHC RBC-ENTMCNC: 27.8 PG — SIGNIFICANT CHANGE UP (ref 27–34)
MCHC RBC-ENTMCNC: 31.9 GM/DL — LOW (ref 32–36)
MCV RBC AUTO: 87.1 FL — SIGNIFICANT CHANGE UP (ref 80–100)
MONOCYTES # BLD AUTO: 0.91 K/UL — HIGH (ref 0–0.9)
MONOCYTES NFR BLD AUTO: 8.1 % — SIGNIFICANT CHANGE UP (ref 2–14)
NEUTROPHILS # BLD AUTO: 8.25 K/UL — HIGH (ref 1.8–7.4)
NEUTROPHILS NFR BLD AUTO: 73.5 % — SIGNIFICANT CHANGE UP (ref 43–77)
NRBC # BLD: 0 /100 WBCS — SIGNIFICANT CHANGE UP
NRBC # FLD: 0 K/UL — SIGNIFICANT CHANGE UP
PHOSPHATE SERPL-MCNC: 4.1 MG/DL — SIGNIFICANT CHANGE UP (ref 2.5–4.5)
PLATELET # BLD AUTO: 254 K/UL — SIGNIFICANT CHANGE UP (ref 150–400)
POTASSIUM SERPL-MCNC: 4.6 MMOL/L — SIGNIFICANT CHANGE UP (ref 3.5–5.3)
POTASSIUM SERPL-SCNC: 4.6 MMOL/L — SIGNIFICANT CHANGE UP (ref 3.5–5.3)
RBC # BLD: 3.71 M/UL — LOW (ref 3.8–5.2)
RBC # FLD: 12.6 % — SIGNIFICANT CHANGE UP (ref 10.3–14.5)
SODIUM SERPL-SCNC: 136 MMOL/L — SIGNIFICANT CHANGE UP (ref 135–145)
WBC # BLD: 11.23 K/UL — HIGH (ref 3.8–10.5)
WBC # FLD AUTO: 11.23 K/UL — HIGH (ref 3.8–10.5)

## 2021-07-07 RX ORDER — APIXABAN 2.5 MG/1
1 TABLET, FILM COATED ORAL
Qty: 56 | Refills: 0
Start: 2021-07-07

## 2021-07-07 RX ORDER — ENOXAPARIN SODIUM 100 MG/ML
40 INJECTION SUBCUTANEOUS DAILY
Refills: 0 | Status: DISCONTINUED | OUTPATIENT
Start: 2021-07-07 | End: 2021-07-09

## 2021-07-07 RX ORDER — ACETAMINOPHEN 500 MG
650 TABLET ORAL EVERY 6 HOURS
Refills: 0 | Status: COMPLETED | OUTPATIENT
Start: 2021-07-07 | End: 2021-07-09

## 2021-07-07 RX ORDER — SODIUM CHLORIDE 9 MG/ML
3 INJECTION INTRAMUSCULAR; INTRAVENOUS; SUBCUTANEOUS EVERY 8 HOURS
Refills: 0 | Status: DISCONTINUED | OUTPATIENT
Start: 2021-07-07 | End: 2021-07-09

## 2021-07-07 RX ORDER — OXYCODONE HYDROCHLORIDE 5 MG/1
5 TABLET ORAL
Refills: 0 | Status: DISCONTINUED | OUTPATIENT
Start: 2021-07-07 | End: 2021-07-09

## 2021-07-07 RX ORDER — MAGNESIUM OXIDE 400 MG ORAL TABLET 241.3 MG
400 TABLET ORAL ONCE
Refills: 0 | Status: COMPLETED | OUTPATIENT
Start: 2021-07-07 | End: 2021-07-07

## 2021-07-07 RX ORDER — IBUPROFEN 200 MG
600 TABLET ORAL EVERY 6 HOURS
Refills: 0 | Status: DISCONTINUED | OUTPATIENT
Start: 2021-07-07 | End: 2021-07-08

## 2021-07-07 RX ORDER — OXYCODONE HYDROCHLORIDE 5 MG/1
10 TABLET ORAL
Refills: 0 | Status: DISCONTINUED | OUTPATIENT
Start: 2021-07-07 | End: 2021-07-09

## 2021-07-07 RX ADMIN — OXYCODONE HYDROCHLORIDE 10 MILLIGRAM(S): 5 TABLET ORAL at 14:27

## 2021-07-07 RX ADMIN — Medication 600 MILLIGRAM(S): at 15:26

## 2021-07-07 RX ADMIN — HYDROMORPHONE HYDROCHLORIDE 30 MILLILITER(S): 2 INJECTION INTRAMUSCULAR; INTRAVENOUS; SUBCUTANEOUS at 08:41

## 2021-07-07 RX ADMIN — OXYCODONE HYDROCHLORIDE 10 MILLIGRAM(S): 5 TABLET ORAL at 20:42

## 2021-07-07 RX ADMIN — Medication 15 MILLIGRAM(S): at 06:07

## 2021-07-07 RX ADMIN — Medication 600 MILLIGRAM(S): at 21:22

## 2021-07-07 RX ADMIN — Medication 15 MILLIGRAM(S): at 12:04

## 2021-07-07 RX ADMIN — OXYCODONE HYDROCHLORIDE 10 MILLIGRAM(S): 5 TABLET ORAL at 15:00

## 2021-07-07 RX ADMIN — Medication 650 MILLIGRAM(S): at 11:23

## 2021-07-07 RX ADMIN — MAGNESIUM OXIDE 400 MG ORAL TABLET 400 MILLIGRAM(S): 241.3 TABLET ORAL at 11:23

## 2021-07-07 RX ADMIN — SODIUM CHLORIDE 3 MILLILITER(S): 9 INJECTION INTRAMUSCULAR; INTRAVENOUS; SUBCUTANEOUS at 21:17

## 2021-07-07 RX ADMIN — Medication 650 MILLIGRAM(S): at 17:49

## 2021-07-07 RX ADMIN — SIMETHICONE 80 MILLIGRAM(S): 80 TABLET, CHEWABLE ORAL at 11:24

## 2021-07-07 RX ADMIN — ENOXAPARIN SODIUM 40 MILLIGRAM(S): 100 INJECTION SUBCUTANEOUS at 11:23

## 2021-07-07 RX ADMIN — HEPARIN SODIUM 5000 UNIT(S): 5000 INJECTION INTRAVENOUS; SUBCUTANEOUS at 06:07

## 2021-07-07 RX ADMIN — OXYCODONE HYDROCHLORIDE 10 MILLIGRAM(S): 5 TABLET ORAL at 20:12

## 2021-07-07 RX ADMIN — Medication 650 MILLIGRAM(S): at 12:03

## 2021-07-07 RX ADMIN — Medication 400 MILLIGRAM(S): at 02:43

## 2021-07-07 NOTE — PROGRESS NOTE ADULT - PROBLEM SELECTOR PLAN 1
Fellow Addendum:    Pt seen and examined at bedside. Agree with above. Pain controlled. Tolerated liquids. Dave in place.     VS reviewed  Labs reviewed    - ADAT  - d/c PCA and transition to oral analgesia  - Encourage ambulation and IS use  - d/c dave  - OOB  - DVT ppx  - Dispo: continue inpatient management    FRANKLIN Reinoso, PGY6

## 2021-07-07 NOTE — DISCHARGE NOTE PROVIDER - CARE PROVIDER_API CALL
Magalys Fraire  GYNECOLOGIC ONCOLOGY  [address], [place] [address]  Phone: [phone]  Fax: [phone]  Established Patient  Scheduled Appointment: 07/21/2021 11:00 AM

## 2021-07-07 NOTE — DISCHARGE NOTE PROVIDER - NSDCCPCAREPLAN_GEN_ALL_CORE_FT
PRINCIPAL DISCHARGE DIAGNOSIS  Diagnosis: Ovarian cyst  Assessment and Plan of Treatment:        PRINCIPAL DISCHARGE DIAGNOSIS  Diagnosis: Ovarian cyst  Assessment and Plan of Treatment: post operative staet

## 2021-07-07 NOTE — DISCHARGE NOTE PROVIDER - REASON FOR ADMISSION
Post-operative care for exploratory laparoscopy, LINCOLN, RSO, omentectomy, and optimal debulking.  Patient presented for exploratory laparoscopy, LINCOLN, RSO, omentectomy, and optimal debulking.

## 2021-07-07 NOTE — DISCHARGE NOTE PROVIDER - PROVIDER TOKENS
PROVIDER:[TOKEN:[[id number]],SCHEDULEDAPPT:[07/21/2021],SCHEDULEDAPPTTIME:[11:00 AM],ESTABLISHEDPATIENT:[T]]

## 2021-07-07 NOTE — PROGRESS NOTE ADULT - SUBJECTIVE AND OBJECTIVE BOX
Anesthesia Pain Management Service    SUBJECTIVE: Patient states her pain is managed well with IV PCA.  Pain Scale Score	At rest: _6/10__ 	With Activity: ___ 	[X ] Refer to charted pain scores    THERAPY:    [ ] IV PCA Morphine		[ ] 5 mg/mL	[ ] 1 mg/mL  [X ] IV PCA Hydromorphone	[ ] 5 mg/mL	[X ] 1 mg/mL  [ ] IV PCA Fentanyl		[ ] 50 micrograms/mL    Demand dose __0.2_ lockout __6_ (minutes) Continuous Rate _0__ Total: __2_   mg used (in past 24 hrs)      MEDICATIONS  (STANDING):  acetaminophen   Tablet .. 650 milliGRAM(s) Oral every 6 hours  enoxaparin Injectable 40 milliGRAM(s) SubCutaneous daily  ketorolac   Injectable 15 milliGRAM(s) IV Push every 6 hours  lactated ringers. 1000 milliLiter(s) (125 mL/Hr) IV Continuous <Continuous>  magnesium oxide 400 milliGRAM(s) Oral once    MEDICATIONS  (PRN):  naloxone Injectable 0.1 milliGRAM(s) IV Push every 3 minutes PRN For ANY of the following changes in patient status:  A. RR LESS THAN 10 breaths per minute, B. Oxygen saturation LESS THAN 90%, C. Sedation score of 6  ondansetron Injectable 4 milliGRAM(s) IV Push every 6 hours PRN Nausea and/or Vomiting  oxyCODONE    IR 5 milliGRAM(s) Oral every 3 hours PRN Moderate Pain (4 - 6)  oxyCODONE    IR 10 milliGRAM(s) Oral every 3 hours PRN Severe Pain (7 - 10)  simethicone 80 milliGRAM(s) Chew every 6 hours PRN Gas      OBJECTIVE: Patient sitting up in chair.    Sedation Score:	[ X] Alert	[ ] Drowsy 	[ ] Arousable	[ ] Asleep	[ ] Unresponsive    Side Effects:	[X ] None	[ ] Nausea	[ ] Vomiting	[ ] Pruritus  		[ ] Other:    Vital Signs Last 24 Hrs  T(C): 37 (07 Jul 2021 06:03), Max: 37.1 (07 Jul 2021 01:57)  T(F): 98.6 (07 Jul 2021 06:03), Max: 98.8 (07 Jul 2021 01:57)  HR: 82 (07 Jul 2021 06:03) (82 - 103)  BP: 108/68 (07 Jul 2021 06:03) (108/68 - 122/69)  BP(mean): 89 (06 Jul 2021 19:00) (80 - 89)  RR: 18 (07 Jul 2021 06:03) (14 - 23)  SpO2: 95% (07 Jul 2021 06:03) (93% - 99%)    ASSESSMENT/ PLAN    Therapy to  be:	[ ] Continue   [ X] Discontinued   [X ] Change to prn Analgesics    Documentation and Verification of current medications:   [X] Done	[ ] Not done, not elligible    Comments: Discussed patient with primary team. IV PCA discontinued PRN Oral/IV opioids and/or Adjuvant non-opioid medication to be ordered at this point.    Progress Note written now but Patient was seen earlier.

## 2021-07-07 NOTE — PROGRESS NOTE ADULT - SUBJECTIVE AND OBJECTIVE BOX
Anesthesia Pain Management Service- Attending Addendum    SUBJECTIVE: Patient's pain control adequate    Therapy:	  [ X] IV PCA	   [ ] Epidural           [ ] s/p Spinal Opoid              [ ] Postpartum infusion	  [ ] Patient controlled regional anesthesia (PCRA)    [ ] prn Analgesics    Allergies    No Known Drug Allergies  Nuts (Headache)  onions, kiwi- headache (Other)    Intolerances      MEDICATIONS  (STANDING):  acetaminophen   Tablet .. 650 milliGRAM(s) Oral every 6 hours  enoxaparin Injectable 40 milliGRAM(s) SubCutaneous daily  ibuprofen  Tablet. 600 milliGRAM(s) Oral every 6 hours  sodium chloride 0.9% lock flush 3 milliLiter(s) IV Push every 8 hours    MEDICATIONS  (PRN):  naloxone Injectable 0.1 milliGRAM(s) IV Push every 3 minutes PRN For ANY of the following changes in patient status:  A. RR LESS THAN 10 breaths per minute, B. Oxygen saturation LESS THAN 90%, C. Sedation score of 6  ondansetron Injectable 4 milliGRAM(s) IV Push every 6 hours PRN Nausea and/or Vomiting  oxyCODONE    IR 5 milliGRAM(s) Oral every 3 hours PRN Moderate Pain (4 - 6)  oxyCODONE    IR 10 milliGRAM(s) Oral every 3 hours PRN Severe Pain (7 - 10)  simethicone 80 milliGRAM(s) Chew every 6 hours PRN Gas      OBJECTIVE:   [X] No new signs     [ ] Other:    Side Effects:  [X ] None			[ ] Other:      ASSESSMENT/PLAN  -Discontinue current therapy    [ ] Therapy changed to:    [ ] IV PCA       [ ] Epidural     [ X] prn Analgesics     Comments: Pain management per primary team, APS to sign off    Note entered after patient seen

## 2021-07-07 NOTE — DISCHARGE NOTE PROVIDER - NSDCMRMEDTOKEN_GEN_ALL_CORE_FT
Albuterol (Eqv-Proventil HFA) 90 mcg/inh inhalation aerosol: 2 puff(s) inhaled every 6 hours, As Needed  apixaban 2.5 mg oral tablet: 1 tab(s) orally 2 times a day   cetirizine 10 mg oral tablet: 1 tab(s) orally once a day  Flovent 220 mcg/inh inhalation aerosol with adapter: inhaled 2 times a day, As Needed  ibuprofen 600 mg oral tablet: 1 tab(s) orally every 6 hours, As Needed  oxyCODONE 5 mg oral tablet: 1 tab(s) orally every 6 hours MDD:4  Tylenol 500 mg oral tablet: 2 tab(s) orally every 6 hours   Albuterol (Eqv-Proventil HFA) 90 mcg/inh inhalation aerosol: 2 puff(s) inhaled every 6 hours, As Needed  apixaban 2.5 mg oral tablet: 1 tab(s) orally 2 times a day   Flovent 220 mcg/inh inhalation aerosol with adapter: inhaled 2 times a day, As Needed  ibuprofen 600 mg oral tablet: 1 tab(s) orally every 6 hours, As Needed   oxyCODONE 5 mg oral tablet: 1 tab(s) orally every 6 hours MDD:4  simethicone 80 mg oral tablet, chewable: 1 tab(s) orally every 6 hours, As needed, Gas  Tylenol 500 mg oral tablet: 2 tab(s) orally every 6 hours

## 2021-07-07 NOTE — CHART NOTE - NSCHARTNOTEFT_GEN_A_CORE
Home Apixaban Note    Apixaban Rx sent to [facility] using the Free Coupon. The Apixaban was picked up by me and given to the pt at bedside.   Gyn Onc Team notified of above.

## 2021-07-07 NOTE — PROGRESS NOTE ADULT - SUBJECTIVE AND OBJECTIVE BOX
R1 Gyn ONC Progress Note POD#1  HD#2    Subjective:   Pt seen and examined at bedside. No events overnight. Pain well controlled. Patient ambulating. Passing flatus. Tolerating sips and chips. Pt denies fever, chills, chest pain, SOB, nausea, vomiting, lightheadedness, dizziness.      Objective:  T(F): 98.8 (07-07-21 @ 01:57), Max: 98.8 (07-07-21 @ 01:57)  HR: 94 (07-07-21 @ 01:57) (86 - 103)  BP: 110/72 (07-07-21 @ 01:57) (108/72 - 122/69)  RR: 16 (07-07-21 @ 01:57) (14 - 23)  SpO2: 96% (07-07-21 @ 01:57) (93% - 99%)  Wt(kg): --  I&O's Summary    06 Jul 2021 07:01  -  07 Jul 2021 05:57  --------------------------------------------------------  IN: 125 mL / OUT: 2425 mL / NET: -2300 mL      CAPILLARY BLOOD GLUCOSE      POCT Blood Glucose.: 84 mg/dL (06 Jul 2021 09:50)      MEDICATIONS  (STANDING):  heparin   Injectable 5000 Unit(s) SubCutaneous every 8 hours  HYDROmorphone PCA (1 mG/mL) 30 milliLiter(s) PCA Continuous PCA Continuous  ketorolac   Injectable 15 milliGRAM(s) IV Push every 6 hours  lactated ringers. 1000 milliLiter(s) (125 mL/Hr) IV Continuous <Continuous>    MEDICATIONS  (PRN):  HYDROmorphone PCA (1 mG/mL) Rescue Clinician Bolus 0.5 milliGRAM(s) IV Push every 15 minutes PRN for Pain Scale GREATER THAN 6  naloxone Injectable 0.1 milliGRAM(s) IV Push every 3 minutes PRN For ANY of the following changes in patient status:  A. RR LESS THAN 10 breaths per minute, B. Oxygen saturation LESS THAN 90%, C. Sedation score of 6  ondansetron Injectable 4 milliGRAM(s) IV Push every 6 hours PRN Nausea and/or Vomiting  simethicone 80 milliGRAM(s) Chew every 6 hours PRN Gas      Physical Exam:  Constitutional: NAD, A+O x3  CV: RR S1S2 no m/r/g  Lungs: CTA b/l, good air flow b/l  Abdomen: soft, softly-distended, appropriately-tender. no guarding, no rebound, normal bowel sounds  Incision: midline vertical incision covered in prineo. clean, dry, intact. Abdominal binder in place  Extremities: no lower extremity edema or calf tenderness bilaterally; venodynes in place    LABS:                               R1 Gyn ONC Progress Note POD#1  HD#2    Subjective:   Pt seen and examined at bedside. No events overnight. Pain well controlled. Patient ambulating. Not passing flatus. Tolerating sips and chips. Pt denies fever, chills, chest pain, SOB, nausea, vomiting, lightheadedness, dizziness.      Objective:  T(F): 98.8 (07-07-21 @ 01:57), Max: 98.8 (07-07-21 @ 01:57)  HR: 94 (07-07-21 @ 01:57) (86 - 103)  BP: 110/72 (07-07-21 @ 01:57) (108/72 - 122/69)  RR: 16 (07-07-21 @ 01:57) (14 - 23)  SpO2: 96% (07-07-21 @ 01:57) (93% - 99%)  Wt(kg): --  I&O's Summary    06 Jul 2021 07:01  -  07 Jul 2021 05:57  --------------------------------------------------------  IN: 125 mL / OUT: 2425 mL / NET: -2300 mL      CAPILLARY BLOOD GLUCOSE      POCT Blood Glucose.: 84 mg/dL (06 Jul 2021 09:50)      MEDICATIONS  (STANDING):  heparin   Injectable 5000 Unit(s) SubCutaneous every 8 hours  HYDROmorphone PCA (1 mG/mL) 30 milliLiter(s) PCA Continuous PCA Continuous  ketorolac   Injectable 15 milliGRAM(s) IV Push every 6 hours  lactated ringers. 1000 milliLiter(s) (125 mL/Hr) IV Continuous <Continuous>    MEDICATIONS  (PRN):  HYDROmorphone PCA (1 mG/mL) Rescue Clinician Bolus 0.5 milliGRAM(s) IV Push every 15 minutes PRN for Pain Scale GREATER THAN 6  naloxone Injectable 0.1 milliGRAM(s) IV Push every 3 minutes PRN For ANY of the following changes in patient status:  A. RR LESS THAN 10 breaths per minute, B. Oxygen saturation LESS THAN 90%, C. Sedation score of 6  ondansetron Injectable 4 milliGRAM(s) IV Push every 6 hours PRN Nausea and/or Vomiting  simethicone 80 milliGRAM(s) Chew every 6 hours PRN Gas      Physical Exam:  Constitutional: NAD, A+O x3   Mouth: red bump noted on the left upper lip  CV: RR S1S2 no m/r/g  Lungs: CTA b/l, good air flow b/l  Abdomen: soft, mildly distended, appropriately-tender. no guarding, no rebound, normal bowel sounds  Incision: midline vertical incision covered in prineo. clean, dry, intact. Abdominal binder in place  Extremities: no lower extremity edema or calf tenderness bilaterally; venodynes in place    LABS:

## 2021-07-07 NOTE — PROGRESS NOTE ADULT - ASSESSMENT
Assessment/Plan: 31y POD#1 , s/p     Neuro: Currently has PCA for pain control.Will switch to PO pain medication  CV: Hemodynamically stable. F/u AM CBC  Pulm: Saturating well on RA. Increase incentive spirometry.  GI: Advance diet as tolerated  : Mccrary in place. Adequate UOP. D/c Mccrary after AM Labs. Patient DTV 8 hrs after  Heme: Currenly on HSQ switch to Lovenox/Venodynes for DVT ppx. Increase OOB.    FEN:LR@125. SLIV after void. F/u AM BMP, Mg, Phos  ID: Afebrile  Endo: No current issues   Dispo: continue inpatient care    Helen Gage, PGY-2 Assessment/Plan: 31y POD#1 , s/p Ex lap Total Abdominal Hysterectomy, Right Salpingo-Oophorectomy, Omentectomy and Optimal Debulking for Stage 3 Serous Ovarian Carcinoma. EBL: 300. No acute concerns     Neuro: Currently has PCA for pain control. Will switch to PO pain medication  CV: Hemodynamically stable. F/u AM CBC  Pulm: Saturating well on RA. Increase incentive spirometry.  GI: Advance diet as tolerated  : Mccrary in place. Adequate UOP. D/c Mccrary after AM Labs. Patient DTV 8 hrs after  Heme: Currently on HSQ. Switch to Lovenox/Venodynes for DVT ppx. Increase OOB.    FEN:LR@125. SLIV after void. F/u AM BMP, Mg, Phos  ID: Afebrile  Endo: No current issues   Dispo: continue inpatient care    Helen Gage, PGY-2

## 2021-07-07 NOTE — DISCHARGE NOTE PROVIDER - NSDCFUADDINST_GEN_ALL_CORE_FT
Return to your regular way of eating.  Resume normal activity as tolerated, but no heavy lifting or strenuous activity for 6 weeks.  No driving for next 2 weeks and/or while on narcotic pain medication.  Complete vaginal rest, no tampons, no douching, no tub bathing, no sexual activities for 6 weeks unless otherwise instructed by your doctor.  Call your doctor with any signs and symptoms of infection such as fever, chills, nausea or vomiting.  Call your doctor with redness or swelling at the incision site, fluid leakage or wound separation.  Call your doctor if you're unable to tolerate food or have difficulty urinating.  Call your doctor if you have pain that is not relieved by your prescribed medications.  Notify your doctor with any other concerns.  Follow up with Dr. Fraire on 7/21 at 11:00am   1. Return to your regular way of eating. Resume normal activity as tolerated, however No heavy lifting or strenuous activity for 6 weeks.  No driving for next 2 weeks and/or while on narcotic pain medication.  Complete vaginal rest, no tampons, no douching, no tub bathing, no sexual activities for 6 weeks unless otherwise instructed by your doctor. Call your doctor with any signs and symptoms of infection such as fever, chills, nausea or vomiting.  Call your doctor with redness or swelling at the incision site, fluid leakage or wound separation.  Call your doctor if you're unable to tolerate food or have difficulty urinating.  Call your doctor if you have pain that is not relieved by your prescribed medications.  Notify your doctor with any other concerns.  2. Please take Ibuprofen for moderate pain management. 600mg every 6 hours  3. Please take Tylenol for mild pain management 1000mg every 6 hours (do not take more then 4000mg in any 24 hour period)  4. Please take oxycodone (ONE TABLET) every 6 hours for severe pain.  5. A prescription for Eliquis 2.5mg tablets (anticoagulation) is with your now. It was filled and given to you before your discharge. Please take this medication as prescribed.

## 2021-07-07 NOTE — DISCHARGE NOTE PROVIDER - NSDCACTIVITY_GEN_ALL_CORE
Do not drive or operate machinery/Showering allowed/Do not make important decisions/Stairs allowed/Walking - Indoors allowed/No heavy lifting/straining/Walking - Outdoors allowed Showering allowed/Stairs allowed/Walking - Indoors allowed/No heavy lifting/straining/Walking - Outdoors allowed

## 2021-07-07 NOTE — DISCHARGE NOTE PROVIDER - HOSPITAL COURSE
Patient was admitted for ex-laparotomy, total abdominal hysterectomy, right salpingo-oophorectomy, omentectomy and optimal debulking and post-operative care. Surgery was uncomplicated.     Patient was transferred from PACU in stable condition. There were no acute events ON on POD#0. Pain was well controlled on IV then PO pain medication. Patient tolerated regular diet without GI distress. Mccrary catheter was removed on AM following surgery with successful void. Passing flatus. Post-operative and AM labs (CBC, BMP, Mg, Phos) were WNL. Patient received SQH for anti-coagulation. Patient out of bed and ambulating without difficulty.     Patient's vital signs were stable during hospitalization. PE was unremarkable with soft but appropriately tender abdomen. Incision sites were CDI.     Follow-up appointment scheduled for July 21, 2021 at 11:00 am with Dr. Fraire.    Patient was admitted for ex-laparotomy, total abdominal hysterectomy, right salpingo-oophorectomy, omentectomy and optimal debulking and post-operative care. Surgery was uncomplicated. EBL:300    Patient was transferred from PACU in stable condition. There were no acute events on POD#0. Pain was well controlled on IV then PO pain medication. Patient tolerated regular diet without GI distress. Mccrary catheter was removed on AM following surgery with successful void. Passing flatus. Patient received SQH for anti-coagulation. Patient out of bed and ambulating without difficulty.       Patient to have close follow-up with Dr. Fraire. Appointment scheduled for July 21, 2021 at 11:00 am.     32yo female s/p Ex-laparotomy, total abdominal hysterectomy, right salpingo-oophorectomy, omentectomy and optimal debulking, EBL: 300cc (see operative note for details of procedure). Pt was extubated in the OR and transferred to PACU in a hemodynamically stable condition with PCA for pain control, SQ Heparin for DVT prophylaxis.  On POD#1,  pt was out of bed to chair.  Pt's pain was controlled on PCA, which was discontinued by end of POD#1 and pt was placed on PO meds.  Pt was transitioned from Heparin to Lovenox for continued DVT prophylaxis. Her Mccrary catheter was discontinued and she was able to void spontaneously.  Pt vital signs remained stable throughout post-operative course.  Pt tolerated reg diet.  On POD#2, pt was ambulating at will.  Pt continued on prophylactic dose of Lovenox.  She was tolerating PO Meds and tolerating reg diet.   On POD #3 patient was discharged to home in clinically stable condition. Vital signs were stable as well and lab values are stated below.  Pt has had an uneventful postoperative course.   Patient to have close follow up with Dr. Fraire. Appointment scheduled for July 21, 2021 at 11:00 am.    7/9/2021, case was discussed with Dr. Mederos that the patient is medically cleared and optimized for discharge today. The patient is ambulating and voiding spontaneously, tolerating oral intake, pain was well controlled with oral medication, and vital signs were stable. All home care has been explained to pt and family.  Pt understands home instructions and all questions have been answered regarding home care and discharge.  Medications sent to pharmacy of pt choice.    LABS:             9.6    7.66  )-----------( 227      ( 07-08 @ 05:42 )             30.6                10.3   11.23 )-----------( 254      ( 07-07 @ 05:29 )             32.3     ICU Vital Signs Last 24 Hrs  T(C): 36.9 (09 Jul 2021 09:53), Max: 37.2 (08 Jul 2021 21:43)  T(F): 98.5 (09 Jul 2021 09:53), Max: 99 (08 Jul 2021 21:43)  HR: 78 (09 Jul 2021 09:53) (78 - 97)  BP: 114/76 (09 Jul 2021 09:53) (114/76 - 126/82)  RR: 16 (09 Jul 2021 09:53) (16 - 18)  SpO2: 99% (09 Jul 2021 09:53) (95% - 100%)

## 2021-07-08 ENCOUNTER — TRANSCRIPTION ENCOUNTER (OUTPATIENT)
Age: 31
End: 2021-07-08

## 2021-07-08 LAB
ANION GAP SERPL CALC-SCNC: 10 MMOL/L — SIGNIFICANT CHANGE UP (ref 7–14)
BASOPHILS # BLD AUTO: 0.03 K/UL — SIGNIFICANT CHANGE UP (ref 0–0.2)
BASOPHILS NFR BLD AUTO: 0.4 % — SIGNIFICANT CHANGE UP (ref 0–2)
BUN SERPL-MCNC: 8 MG/DL — SIGNIFICANT CHANGE UP (ref 7–23)
CALCIUM SERPL-MCNC: 8.3 MG/DL — LOW (ref 8.4–10.5)
CHLORIDE SERPL-SCNC: 106 MMOL/L — SIGNIFICANT CHANGE UP (ref 98–107)
CO2 SERPL-SCNC: 24 MMOL/L — SIGNIFICANT CHANGE UP (ref 22–31)
COVID-19 SPIKE DOMAIN AB INTERP: POSITIVE
COVID-19 SPIKE DOMAIN ANTIBODY RESULT: >250 U/ML — HIGH
CREAT SERPL-MCNC: 0.79 MG/DL — SIGNIFICANT CHANGE UP (ref 0.5–1.3)
EOSINOPHIL # BLD AUTO: 0.11 K/UL — SIGNIFICANT CHANGE UP (ref 0–0.5)
EOSINOPHIL NFR BLD AUTO: 1.4 % — SIGNIFICANT CHANGE UP (ref 0–6)
GLUCOSE SERPL-MCNC: 96 MG/DL — SIGNIFICANT CHANGE UP (ref 70–99)
HCT VFR BLD CALC: 30.6 % — LOW (ref 34.5–45)
HGB BLD-MCNC: 9.6 G/DL — LOW (ref 11.5–15.5)
IANC: 3.87 K/UL — SIGNIFICANT CHANGE UP (ref 1.5–8.5)
IMM GRANULOCYTES NFR BLD AUTO: 0.5 % — SIGNIFICANT CHANGE UP (ref 0–1.5)
LYMPHOCYTES # BLD AUTO: 2.98 K/UL — SIGNIFICANT CHANGE UP (ref 1–3.3)
LYMPHOCYTES # BLD AUTO: 38.9 % — SIGNIFICANT CHANGE UP (ref 13–44)
MAGNESIUM SERPL-MCNC: 1.9 MG/DL — SIGNIFICANT CHANGE UP (ref 1.6–2.6)
MCHC RBC-ENTMCNC: 27.4 PG — SIGNIFICANT CHANGE UP (ref 27–34)
MCHC RBC-ENTMCNC: 31.4 GM/DL — LOW (ref 32–36)
MCV RBC AUTO: 87.4 FL — SIGNIFICANT CHANGE UP (ref 80–100)
MONOCYTES # BLD AUTO: 0.63 K/UL — SIGNIFICANT CHANGE UP (ref 0–0.9)
MONOCYTES NFR BLD AUTO: 8.2 % — SIGNIFICANT CHANGE UP (ref 2–14)
NEUTROPHILS # BLD AUTO: 3.87 K/UL — SIGNIFICANT CHANGE UP (ref 1.8–7.4)
NEUTROPHILS NFR BLD AUTO: 50.6 % — SIGNIFICANT CHANGE UP (ref 43–77)
NRBC # BLD: 0 /100 WBCS — SIGNIFICANT CHANGE UP
NRBC # FLD: 0 K/UL — SIGNIFICANT CHANGE UP
PHOSPHATE SERPL-MCNC: 2.9 MG/DL — SIGNIFICANT CHANGE UP (ref 2.5–4.5)
PLATELET # BLD AUTO: 227 K/UL — SIGNIFICANT CHANGE UP (ref 150–400)
POTASSIUM SERPL-MCNC: 3.8 MMOL/L — SIGNIFICANT CHANGE UP (ref 3.5–5.3)
POTASSIUM SERPL-SCNC: 3.8 MMOL/L — SIGNIFICANT CHANGE UP (ref 3.5–5.3)
RBC # BLD: 3.5 M/UL — LOW (ref 3.8–5.2)
RBC # FLD: 13.1 % — SIGNIFICANT CHANGE UP (ref 10.3–14.5)
SARS-COV-2 IGG+IGM SERPL QL IA: >250 U/ML — HIGH
SARS-COV-2 IGG+IGM SERPL QL IA: POSITIVE
SODIUM SERPL-SCNC: 140 MMOL/L — SIGNIFICANT CHANGE UP (ref 135–145)
WBC # BLD: 7.66 K/UL — SIGNIFICANT CHANGE UP (ref 3.8–10.5)
WBC # FLD AUTO: 7.66 K/UL — SIGNIFICANT CHANGE UP (ref 3.8–10.5)

## 2021-07-08 RX ORDER — KETOROLAC TROMETHAMINE 30 MG/ML
30 SYRINGE (ML) INJECTION ONCE
Refills: 0 | Status: DISCONTINUED | OUTPATIENT
Start: 2021-07-08 | End: 2021-07-08

## 2021-07-08 RX ORDER — ONDANSETRON 8 MG/1
4 TABLET, FILM COATED ORAL ONCE
Refills: 0 | Status: COMPLETED | OUTPATIENT
Start: 2021-07-08 | End: 2021-07-08

## 2021-07-08 RX ORDER — SENNA PLUS 8.6 MG/1
2 TABLET ORAL ONCE
Refills: 0 | Status: COMPLETED | OUTPATIENT
Start: 2021-07-08 | End: 2021-07-08

## 2021-07-08 RX ADMIN — ONDANSETRON 4 MILLIGRAM(S): 8 TABLET, FILM COATED ORAL at 18:06

## 2021-07-08 RX ADMIN — SODIUM CHLORIDE 3 MILLILITER(S): 9 INJECTION INTRAMUSCULAR; INTRAVENOUS; SUBCUTANEOUS at 21:37

## 2021-07-08 RX ADMIN — Medication 650 MILLIGRAM(S): at 18:06

## 2021-07-08 RX ADMIN — SODIUM CHLORIDE 3 MILLILITER(S): 9 INJECTION INTRAMUSCULAR; INTRAVENOUS; SUBCUTANEOUS at 13:59

## 2021-07-08 RX ADMIN — Medication 30 MILLIGRAM(S): at 21:44

## 2021-07-08 RX ADMIN — Medication 600 MILLIGRAM(S): at 11:30

## 2021-07-08 RX ADMIN — Medication 600 MILLIGRAM(S): at 16:05

## 2021-07-08 RX ADMIN — Medication 600 MILLIGRAM(S): at 10:18

## 2021-07-08 RX ADMIN — SODIUM CHLORIDE 3 MILLILITER(S): 9 INJECTION INTRAMUSCULAR; INTRAVENOUS; SUBCUTANEOUS at 05:43

## 2021-07-08 RX ADMIN — Medication 650 MILLIGRAM(S): at 05:51

## 2021-07-08 RX ADMIN — Medication 600 MILLIGRAM(S): at 03:04

## 2021-07-08 RX ADMIN — Medication 650 MILLIGRAM(S): at 00:09

## 2021-07-08 RX ADMIN — OXYCODONE HYDROCHLORIDE 10 MILLIGRAM(S): 5 TABLET ORAL at 03:15

## 2021-07-08 RX ADMIN — Medication 650 MILLIGRAM(S): at 13:30

## 2021-07-08 RX ADMIN — ENOXAPARIN SODIUM 40 MILLIGRAM(S): 100 INJECTION SUBCUTANEOUS at 13:30

## 2021-07-08 RX ADMIN — OXYCODONE HYDROCHLORIDE 10 MILLIGRAM(S): 5 TABLET ORAL at 03:14

## 2021-07-08 RX ADMIN — SENNA PLUS 2 TABLET(S): 8.6 TABLET ORAL at 14:36

## 2021-07-08 NOTE — PROGRESS NOTE ADULT - PROBLEM SELECTOR PLAN 1
GYN ONC Fellow Addendum:    Pt seen and examined at bedside. Agree with above. Pain well controlled. tolerating reg diet. ambulating and voiding.    VS reviewed  Labs reviewed    - Continue current pain regimen  - reg diet  - Encourage ambulation and IS use  - DVT ppx: lovenox  - OOB  - Dispo: Anticipate d/c home tomorrow    FRANKLIN Reinoso, PGY6

## 2021-07-08 NOTE — DISCHARGE NOTE NURSING/CASE MANAGEMENT/SOCIAL WORK - NSDCPNINST_GEN_ALL_CORE
Make a follow up appointment with Dr. Fraire. Call MD if you develop a fever, or if there is redness, swelling, drainage or pain not relieved by pain medication. No heavy lifting, bending, or straining to move your bowels. Take over the counter stool softeners as needed to prevent constipation which may be caused by pain medication. Drink plenty of liquids.

## 2021-07-08 NOTE — DISCHARGE NOTE NURSING/CASE MANAGEMENT/SOCIAL WORK - PATIENT PORTAL LINK FT
You can access the FollowMyHealth Patient Portal offered by [facility] by registering at the following website: [email or web address]. By joining [facility]’s FollowMyHealth portal, you will also be able to view your health information using other applications (apps) compatible with our system.

## 2021-07-08 NOTE — PROGRESS NOTE ADULT - SUBJECTIVE AND OBJECTIVE BOX
R1 Gyn ONC Progress Note POD#2  HD#3    Subjective:   Pt seen and examined at bedside. No events overnight. Pain well controlled. Patient ambulating. Passing flatus. Tolerating regular diet. Pt denies fever, chills, chest pain, SOB, nausea, vomiting, lightheadedness, dizziness.      Objective:  T(F): 98.2 (07-08-21 @ 02:34), Max: 98.7 (07-07-21 @ 17:54)  HR: 92 (07-08-21 @ 02:34) (77 - 98)  BP: 114/78 (07-08-21 @ 02:34) (108/68 - 119/81)  RR: 16 (07-08-21 @ 02:34) (16 - 18)  SpO2: 95% (07-08-21 @ 02:34) (95% - 98%)  Wt(kg): --  I&O's Summary    06 Jul 2021 07:01  -  07 Jul 2021 07:00  --------------------------------------------------------  IN: 125 mL / OUT: 2625 mL / NET: -2500 mL    07 Jul 2021 07:01  -  08 Jul 2021 05:43  --------------------------------------------------------  IN: 500 mL / OUT: 1575 mL / NET: -1075 mL      CAPILLARY BLOOD GLUCOSE          MEDICATIONS  (STANDING):  acetaminophen   Tablet .. 650 milliGRAM(s) Oral every 6 hours  enoxaparin Injectable 40 milliGRAM(s) SubCutaneous daily  ibuprofen  Tablet. 600 milliGRAM(s) Oral every 6 hours  sodium chloride 0.9% lock flush 3 milliLiter(s) IV Push every 8 hours    MEDICATIONS  (PRN):  naloxone Injectable 0.1 milliGRAM(s) IV Push every 3 minutes PRN For ANY of the following changes in patient status:  A. RR LESS THAN 10 breaths per minute, B. Oxygen saturation LESS THAN 90%, C. Sedation score of 6  ondansetron Injectable 4 milliGRAM(s) IV Push every 6 hours PRN Nausea and/or Vomiting  oxyCODONE    IR 5 milliGRAM(s) Oral every 3 hours PRN Moderate Pain (4 - 6)  oxyCODONE    IR 10 milliGRAM(s) Oral every 3 hours PRN Severe Pain (7 - 10)  simethicone 80 milliGRAM(s) Chew every 6 hours PRN Gas      Physical Exam:  Constitutional: NAD, A+O x3  CV: RR S1S2 no m/r/g  Lungs: CTA b/l, good air flow b/l  Abdomen: soft, mildly-distended, appropriately-tender. no guarding, no rebound, normal bowel sounds  Incision: midline vertical incision covered in dermabond prineo clean, dry, intact  Extremities: no lower extremity edema or calf tenderness bilaterally; venodynes in place    LABS:    07-07    136    |  103    |  7      ----------------------------<  100<H>  4.6     |  23     |  0.72     Ca    9.1        07 Jul 2021 05:29  Phos  4.1       07-07  Mg     1.90      07-07                               R2 Gyn ONC Progress Note POD#2  HD#3    Subjective:   Pt seen and examined at bedside. No events overnight. Pain well controlled. Patient ambulating. Has not yet passed flatus. Tolerating regular diet. Pt denies fever, chills, chest pain, SOB, nausea, vomiting, lightheadedness, dizziness.      Objective:  T(F): 98.2 (07-08-21 @ 02:34), Max: 98.7 (07-07-21 @ 17:54)  HR: 92 (07-08-21 @ 02:34) (77 - 98)  BP: 114/78 (07-08-21 @ 02:34) (108/68 - 119/81)  RR: 16 (07-08-21 @ 02:34) (16 - 18)  SpO2: 95% (07-08-21 @ 02:34) (95% - 98%)      I&O's Summary  06 Jul 2021 07:01  -  07 Jul 2021 07:00  --------------------------------------------------------  IN: 125 mL / OUT: 2625 mL / NET: -2500 mL    07 Jul 2021 07:01  -  08 Jul 2021 05:43  --------------------------------------------------------  IN: 500 mL / OUT: 1575 mL / NET: -1075 mL      MEDICATIONS  (STANDING):  acetaminophen   Tablet .. 650 milliGRAM(s) Oral every 6 hours  enoxaparin Injectable 40 milliGRAM(s) SubCutaneous daily  ibuprofen  Tablet. 600 milliGRAM(s) Oral every 6 hours  sodium chloride 0.9% lock flush 3 milliLiter(s) IV Push every 8 hours    MEDICATIONS  (PRN):  naloxone Injectable 0.1 milliGRAM(s) IV Push every 3 minutes PRN For ANY of the following changes in patient status:  A. RR LESS THAN 10 breaths per minute, B. Oxygen saturation LESS THAN 90%, C. Sedation score of 6  ondansetron Injectable 4 milliGRAM(s) IV Push every 6 hours PRN Nausea and/or Vomiting  oxyCODONE    IR 5 milliGRAM(s) Oral every 3 hours PRN Moderate Pain (4 - 6)  oxyCODONE    IR 10 milliGRAM(s) Oral every 3 hours PRN Severe Pain (7 - 10)  simethicone 80 milliGRAM(s) Chew every 6 hours PRN Gas      Physical Exam:  Constitutional: NAD, A+O x3  CV: RR S1S2 no m/r/g  Lungs: CTA b/l, good air flow b/l  Abdomen: soft, mildly-distended, appropriately-tender. no guarding, no rebound, normal bowel sounds  Incision: midline vertical incision covered in dermabond prineo clean, dry, intact  Extremities: no lower extremity edema or calf tenderness bilaterally; venodynes in place    LABS:    07-07    136    |  103    |  7      ----------------------------<  100<H>  4.6     |  23     |  0.72     Ca    9.1        07 Jul 2021 05:29  Phos  4.1       07-07  Mg     1.90      07-07

## 2021-07-08 NOTE — DISCHARGE NOTE NURSING/CASE MANAGEMENT/SOCIAL WORK - NSDPDISTO_GEN_ALL_CORE
Pt. is afebrile and offers no complaints. In no acute distress. Midline abdominal incision with binder: clean, dry and intact. Pt is ambulating, tolerating diet well, and voiding in adequate amounts./Home

## 2021-07-08 NOTE — PROGRESS NOTE ADULT - ASSESSMENT
Assessment/Plan: 31y POD#2 , s/p Ex lap Total Abdominal Hysterectomy, Right Salpingo-Oophorectomy, Omentectomy and Optimal Debulking for Stage 3 Serous Ovarian Carcinoma. EBL: 300. Recovering well with no acute concerns.     Neuro: Pain well controlled. Continue on PO pain medication( Tylenol, Motrin, Oxycodone)  CV: Hemodynamically stable. F/u AM CBC  Pulm: Saturating well on RA. Increase incentive spirometry.  GI: Advance diet as tolerated  : Voiding spontaneously. Urine output adequate  Heme: Currently on Lovenox/Venodynes for DVT ppx. Increase OOB. Home apixaban at patient's bedside   FEN: SLIV . F/u AM BMP, Mg, Phos. Replete electrolytes PRN  ID: Afebrile  Endo: No current issues   Dispo: continue inpatient care,     Helen Gage, PGY-2 Assessment/Plan: 31y POD#2 s/p Ex lap Total Abdominal Hysterectomy, Right Salpingo-Oophorectomy, Omentectomy and Optimal Debulking for Stage 3 Serous Ovarian Carcinoma. EBL: 300. Recovering well with no acute concerns.     Neuro: Pain well controlled. Continue on PO pain medication (Tylenol, Motrin, Oxycodone)  CV: Hemodynamically stable. F/u AM CBC  Pulm: Saturating well on RA. Increase incentive spirometry.  GI: Advance diet as tolerated  : Voiding spontaneously. Urine output adequate  Heme: Currently on Lovenox/Venodynes for DVT ppx. Increase OOB. Home apixaban at patient's bedside   FEN: SLIV . F/u AM BMP, Mg, Phos. Replete electrolytes PRN  ID: Afebrile  Endo: No current issues   Dispo: continue inpatient care, encourage OOB.     Helen Gage, PGY-2

## 2021-07-09 VITALS
RESPIRATION RATE: 16 BRPM | HEART RATE: 88 BPM | DIASTOLIC BLOOD PRESSURE: 84 MMHG | TEMPERATURE: 99 F | OXYGEN SATURATION: 97 % | SYSTOLIC BLOOD PRESSURE: 127 MMHG

## 2021-07-09 RX ORDER — OXYCODONE HYDROCHLORIDE 5 MG/1
1 TABLET ORAL
Qty: 8 | Refills: 0
Start: 2021-07-09 | End: 2021-07-10

## 2021-07-09 RX ORDER — IBUPROFEN 200 MG
1 TABLET ORAL
Qty: 60 | Refills: 0
Start: 2021-07-09 | End: 2021-07-23

## 2021-07-09 RX ORDER — SIMETHICONE 80 MG/1
1 TABLET, CHEWABLE ORAL
Qty: 0 | Refills: 0 | DISCHARGE
Start: 2021-07-09

## 2021-07-09 RX ORDER — IBUPROFEN 200 MG
1 TABLET ORAL
Qty: 0 | Refills: 0 | DISCHARGE

## 2021-07-09 RX ORDER — ALBUTEROL 90 UG/1
2 AEROSOL, METERED ORAL
Qty: 0 | Refills: 0 | DISCHARGE

## 2021-07-09 RX ORDER — IBUPROFEN 200 MG
600 TABLET ORAL EVERY 6 HOURS
Refills: 0 | Status: DISCONTINUED | OUTPATIENT
Start: 2021-07-09 | End: 2021-07-09

## 2021-07-09 RX ORDER — ACETAMINOPHEN 500 MG
975 TABLET ORAL EVERY 6 HOURS
Refills: 0 | Status: DISCONTINUED | OUTPATIENT
Start: 2021-07-09 | End: 2021-07-09

## 2021-07-09 RX ORDER — CETIRIZINE HYDROCHLORIDE 10 MG/1
1 TABLET ORAL
Qty: 0 | Refills: 0 | DISCHARGE

## 2021-07-09 RX ADMIN — Medication 650 MILLIGRAM(S): at 00:04

## 2021-07-09 RX ADMIN — Medication 650 MILLIGRAM(S): at 05:41

## 2021-07-09 RX ADMIN — Medication 600 MILLIGRAM(S): at 09:41

## 2021-07-09 RX ADMIN — OXYCODONE HYDROCHLORIDE 5 MILLIGRAM(S): 5 TABLET ORAL at 13:53

## 2021-07-09 RX ADMIN — SODIUM CHLORIDE 3 MILLILITER(S): 9 INJECTION INTRAMUSCULAR; INTRAVENOUS; SUBCUTANEOUS at 05:48

## 2021-07-09 NOTE — PROGRESS NOTE ADULT - PROBLEM SELECTOR PLAN 1
GYN ONC Fellow Addendum:    Pt seen and examined at bedside. Agree with above. Pt c/o hot flashes. Pain controlled. Tolerating po. +flatus.     VS reviewed  No labs    - Continue current pain regimen  - Reg diet  - Encourage ambulation and IS use  - DVT ppx: lovenox  - OOB  - Dispo: d/c home today     FRANKLIN Reinoso, PGY6

## 2021-07-09 NOTE — PROGRESS NOTE ADULT - SUBJECTIVE AND OBJECTIVE BOX
Gyn ONC Progress Note HD#4     Patient seen and examined at bedside. No events overnight. Pain well controlled. Patient ambulating. Passing flatus. Tolerating regular diet. Pt denies fever, chills, chest pain, SOB, nausea, vomiting, lightheadedness, dizziness.      Objective:  T(F): 98.5 (07-09-21 @ 01:50), Max: 99 (07-08-21 @ 21:43)  HR: 91 (07-09-21 @ 01:50) (86 - 97)  BP: 123/82 (07-09-21 @ 01:50) (107/70 - 126/82)  RR: 16 (07-09-21 @ 01:50) (16 - 18)  SpO2: 95% (07-09-21 @ 01:50) (95% - 100%)      I&O's Summary  07 Jul 2021 07:01  -  08 Jul 2021 07:00  --------------------------------------------------------  IN: 500 mL / OUT: 1575 mL / NET: -1075 mL    08 Jul 2021 07:01  -  09 Jul 2021 05:28  --------------------------------------------------------  IN: 0 mL / OUT: 825 mL / NET: -825 mL      MEDICATIONS  (STANDING):  acetaminophen   Tablet .. 650 milliGRAM(s) Oral every 6 hours  enoxaparin Injectable 40 milliGRAM(s) SubCutaneous daily  sodium chloride 0.9% lock flush 3 milliLiter(s) IV Push every 8 hours    MEDICATIONS  (PRN):  naloxone Injectable 0.1 milliGRAM(s) IV Push every 3 minutes PRN For ANY of the following changes in patient status:  A. RR LESS THAN 10 breaths per minute, B. Oxygen saturation LESS THAN 90%, C. Sedation score of 6  ondansetron Injectable 4 milliGRAM(s) IV Push every 6 hours PRN Nausea and/or Vomiting  oxyCODONE    IR 10 milliGRAM(s) Oral every 3 hours PRN Severe Pain (7 - 10)  oxyCODONE    IR 5 milliGRAM(s) Oral every 3 hours PRN Moderate Pain (4 - 6)  simethicone 80 milliGRAM(s) Chew every 6 hours PRN Gas      Physical Exam:  Constitutional: NAD, A+O x3  CV: RR S1S2 no m/r/g  Lungs: CTA b/l, good air flow b/l  Abdomen: soft, mildly-distended, appropriately-tender. no guarding, no rebound, normal bowel sounds  Incision: midline vertical incision covered in dermabond prineo clean, dry, intact  Extremities: no lower extremity edema or calf tenderness bilaterally; venodynes in place      LABS:  07-08    140    |  106    |  8      ----------------------------<  96     3.8     |  24     |  0.79     Ca    8.3<L>      08 Jul 2021 05:42  Phos  2.9       07-08  Mg     1.90      07-08

## 2021-07-09 NOTE — PROGRESS NOTE ADULT - ASSESSMENT
30y/o F now POD#3 s/p Ex lap Total Abdominal Hysterectomy, Right Salpingo-Oophorectomy, Omentectomy and Optimal Debulking for Stage 3 Serous Ovarian Carcinoma. EBL: 300. Recovering well, meeting appropriate milestones and with no acute concerns.     Neuro: Pain well controlled. Continue on PO pain medication (Tylenol, Motrin, Oxycodone)  CV: Hemodynamically stable.   Pulm: Saturating well on RA. Increase incentive spirometry.  GI: Tolerating regular diet.   : Voiding spontaneously. Urine output adequate  Heme: Currently on Lovenox with Venodynes for DVT ppx. Increase OOB. Home Apixaban at patient's bedside.   FEN: SLIV.  Replete electrolytes PRN  ID: Afebrile  Endo: No current issues   Dispo: Continue inpatient care, encourage OOB, possible discharge home later today.     Bernadine, PGY-3  30y/o F now POD#3 s/p Ex lap Total Abdominal Hysterectomy, Right Salpingo-Oophorectomy, Omentectomy and Optimal Debulking for Stage 3 Low Grade Serous Ovarian Carcinoma. EBL: 300. Recovering well, meeting appropriate milestones and with no acute concerns.     Neuro: Pain well controlled. Continue on PO pain medication (Tylenol, Motrin, Oxycodone)  CV: Hemodynamically stable.   Pulm: Saturating well on RA. Increase incentive spirometry.  GI: Tolerating regular diet.   : Voiding spontaneously. Urine output adequate  Heme: Currently on Lovenox with Venodynes for DVT ppx. Increase OOB. Home Apixaban at patient's bedside.   FEN: SLIV.  Replete electrolytes PRN  ID: Afebrile  Endo: No current issues   Dispo: Continue inpatient care, encourage OOB, possible discharge home later today.     Bernadine, PGY-3

## 2021-07-09 NOTE — PROGRESS NOTE ADULT - ATTENDING COMMENTS
Patient seen and examined at bedside with team, agree with above gyn resident note, including assessment and plan. Abdomen benign. Discussed today's plan of care with patient, all questions answered. Continue routine postop care.
Recovering well with small passage of flatus & tolerating PO.  Anticipate discharge 7/9 & patient in agreement.

## 2021-07-13 ENCOUNTER — NON-APPOINTMENT (OUTPATIENT)
Age: 31
End: 2021-07-13

## 2021-07-14 ENCOUNTER — NON-APPOINTMENT (OUTPATIENT)
Age: 31
End: 2021-07-14

## 2021-07-14 LAB — SURGICAL PATHOLOGY STUDY: SIGNIFICANT CHANGE UP

## 2021-07-21 ENCOUNTER — APPOINTMENT (OUTPATIENT)
Dept: GYNECOLOGIC ONCOLOGY | Facility: CLINIC | Age: 31
End: 2021-07-21
Payer: MEDICAID

## 2021-07-21 VITALS
BODY MASS INDEX: 37.76 KG/M2 | HEIGHT: 61 IN | SYSTOLIC BLOOD PRESSURE: 114 MMHG | TEMPERATURE: 97.7 F | OXYGEN SATURATION: 99 % | DIASTOLIC BLOOD PRESSURE: 81 MMHG | HEART RATE: 73 BPM | WEIGHT: 200 LBS

## 2021-07-21 DIAGNOSIS — Z48.89 ENCOUNTER FOR OTHER SPECIFIED SURGICAL AFTERCARE: ICD-10-CM

## 2021-07-21 PROCEDURE — 99024 POSTOP FOLLOW-UP VISIT: CPT

## 2021-07-22 ENCOUNTER — NON-APPOINTMENT (OUTPATIENT)
Age: 31
End: 2021-07-22

## 2021-07-30 ENCOUNTER — OUTPATIENT (OUTPATIENT)
Dept: OUTPATIENT SERVICES | Facility: HOSPITAL | Age: 31
LOS: 1 days | Discharge: HOME | End: 2021-07-30

## 2021-07-30 DIAGNOSIS — C56.9 MALIGNANT NEOPLASM OF UNSPECIFIED OVARY: ICD-10-CM

## 2021-08-02 ENCOUNTER — RESULT REVIEW (OUTPATIENT)
Age: 31
End: 2021-08-02

## 2021-08-02 ENCOUNTER — LABORATORY RESULT (OUTPATIENT)
Age: 31
End: 2021-08-02

## 2021-08-02 ENCOUNTER — APPOINTMENT (OUTPATIENT)
Dept: HEMATOLOGY ONCOLOGY | Facility: CLINIC | Age: 31
End: 2021-08-02
Payer: MEDICAID

## 2021-08-02 VITALS
DIASTOLIC BLOOD PRESSURE: 86 MMHG | HEART RATE: 90 BPM | WEIGHT: 197.95 LBS | OXYGEN SATURATION: 98 % | TEMPERATURE: 97.3 F | SYSTOLIC BLOOD PRESSURE: 122 MMHG | HEIGHT: 61 IN | RESPIRATION RATE: 16 BRPM | BODY MASS INDEX: 37.37 KG/M2

## 2021-08-02 LAB
BASOPHILS # BLD AUTO: 0.06 K/UL — SIGNIFICANT CHANGE UP (ref 0–0.2)
BASOPHILS NFR BLD AUTO: 0.7 % — SIGNIFICANT CHANGE UP (ref 0–2)
EOSINOPHIL # BLD AUTO: 0.25 K/UL — SIGNIFICANT CHANGE UP (ref 0–0.5)
EOSINOPHIL NFR BLD AUTO: 2.8 % — SIGNIFICANT CHANGE UP (ref 0–6)
HCT VFR BLD CALC: 36.7 % — SIGNIFICANT CHANGE UP (ref 34.5–45)
HGB BLD-MCNC: 11.5 G/DL — SIGNIFICANT CHANGE UP (ref 11.5–15.5)
IMM GRANULOCYTES NFR BLD AUTO: 0.3 % — SIGNIFICANT CHANGE UP (ref 0–1.5)
LYMPHOCYTES # BLD AUTO: 3.56 K/UL — HIGH (ref 1–3.3)
LYMPHOCYTES # BLD AUTO: 39.8 % — SIGNIFICANT CHANGE UP (ref 13–44)
MCHC RBC-ENTMCNC: 28.2 PG — SIGNIFICANT CHANGE UP (ref 27–34)
MCHC RBC-ENTMCNC: 31.3 G/DL — LOW (ref 32–36)
MCV RBC AUTO: 90 FL — SIGNIFICANT CHANGE UP (ref 80–100)
MONOCYTES # BLD AUTO: 0.66 K/UL — SIGNIFICANT CHANGE UP (ref 0–0.9)
MONOCYTES NFR BLD AUTO: 7.4 % — SIGNIFICANT CHANGE UP (ref 2–14)
NEUTROPHILS # BLD AUTO: 4.38 K/UL — SIGNIFICANT CHANGE UP (ref 1.8–7.4)
NEUTROPHILS NFR BLD AUTO: 49 % — SIGNIFICANT CHANGE UP (ref 43–77)
NRBC # BLD: 0 /100 WBCS — SIGNIFICANT CHANGE UP (ref 0–0)
PLATELET # BLD AUTO: 281 K/UL — SIGNIFICANT CHANGE UP (ref 150–400)
RBC # BLD: 4.08 M/UL — SIGNIFICANT CHANGE UP (ref 3.8–5.2)
RBC # FLD: 12.6 % — SIGNIFICANT CHANGE UP (ref 10.3–14.5)
WBC # BLD: 8.94 K/UL — SIGNIFICANT CHANGE UP (ref 3.8–10.5)
WBC # FLD AUTO: 8.94 K/UL — SIGNIFICANT CHANGE UP (ref 3.8–10.5)

## 2021-08-02 PROCEDURE — 99205 OFFICE O/P NEW HI 60 MIN: CPT | Mod: 25

## 2021-08-02 PROCEDURE — 93000 ELECTROCARDIOGRAM COMPLETE: CPT

## 2021-08-03 ENCOUNTER — NON-APPOINTMENT (OUTPATIENT)
Age: 31
End: 2021-08-03

## 2021-08-03 LAB
ALBUMIN SERPL ELPH-MCNC: 4.7 G/DL
ALP BLD-CCNC: 80 U/L
ALT SERPL-CCNC: 35 U/L
ANION GAP SERPL CALC-SCNC: 14 MMOL/L
AST SERPL-CCNC: 26 U/L
BILIRUB SERPL-MCNC: 0.2 MG/DL
BUN SERPL-MCNC: 13 MG/DL
CALCIUM SERPL-MCNC: 9.9 MG/DL
CANCER AG125 SERPL-ACNC: 14 U/ML
CEA SERPL-MCNC: 1 NG/ML
CHLORIDE SERPL-SCNC: 105 MMOL/L
CO2 SERPL-SCNC: 23 MMOL/L
CREAT SERPL-MCNC: 0.7 MG/DL
GLUCOSE SERPL-MCNC: 87 MG/DL
HAV IGM SER QL: NONREACTIVE
HBV CORE IGG+IGM SER QL: NONREACTIVE
HBV CORE IGM SER QL: NONREACTIVE
HBV SURFACE AB SER QL: REACTIVE
HBV SURFACE AG SER QL: NONREACTIVE
HBV SURFACE AG SER QL: NONREACTIVE
HCV AB SER QL: NONREACTIVE
HCV S/CO RATIO: 0.19 S/CO
INR PPP: 1.29 RATIO
POTASSIUM SERPL-SCNC: 4.6 MMOL/L
PROT SERPL-MCNC: 7.8 G/DL
PT BLD: 15 SEC
SODIUM SERPL-SCNC: 142 MMOL/L

## 2021-08-04 NOTE — PROCEDURE
[Resume diet] : resume diet [Site check for bleeding/hematoma] : Site check for bleeding/hematoma [Vital signs on admission the q 15 mins x2] : Vital signs on admission the q 15 mins x2

## 2021-08-05 ENCOUNTER — APPOINTMENT (OUTPATIENT)
Dept: ENDOVASCULAR SURGERY | Facility: CLINIC | Age: 31
End: 2021-08-05
Payer: MEDICAID

## 2021-08-05 VITALS
RESPIRATION RATE: 16 BRPM | HEIGHT: 61 IN | SYSTOLIC BLOOD PRESSURE: 109 MMHG | WEIGHT: 200 LBS | DIASTOLIC BLOOD PRESSURE: 76 MMHG | OXYGEN SATURATION: 81 % | HEART RATE: 100 BPM | BODY MASS INDEX: 37.76 KG/M2

## 2021-08-05 PROCEDURE — 77001 FLUOROGUIDE FOR VEIN DEVICE: CPT

## 2021-08-05 PROCEDURE — 76937 US GUIDE VASCULAR ACCESS: CPT

## 2021-08-05 PROCEDURE — 36561 INSERT TUNNELED CV CATH: CPT

## 2021-08-05 RX ORDER — IBUPROFEN 600 MG/1
600 TABLET, FILM COATED ORAL
Qty: 60 | Refills: 0 | Status: DISCONTINUED | COMMUNITY
Start: 2021-07-09 | End: 2021-08-05

## 2021-08-05 RX ORDER — OXYCODONE 5 MG/1
5 TABLET ORAL
Qty: 8 | Refills: 0 | Status: DISCONTINUED | COMMUNITY
Start: 2021-07-09 | End: 2021-08-05

## 2021-08-05 RX ORDER — CLOBETASOL PROPIONATE 0.5 MG/G
0.05 OINTMENT TOPICAL DAILY
Qty: 1 | Refills: 0 | Status: DISCONTINUED | COMMUNITY
Start: 2021-06-23 | End: 2021-08-05

## 2021-08-05 RX ORDER — METRONIDAZOLE 500 MG/1
500 TABLET ORAL
Qty: 3 | Refills: 0 | Status: DISCONTINUED | COMMUNITY
End: 2021-08-05

## 2021-08-05 RX ORDER — PAROXETINE 7.5 MG/1
7.5 CAPSULE ORAL
Qty: 30 | Refills: 3 | Status: DISCONTINUED | COMMUNITY
Start: 2021-07-21 | End: 2021-08-05

## 2021-08-05 RX ORDER — PAROXETINE HYDROCHLORIDE 10 MG/1
10 TABLET, FILM COATED ORAL
Qty: 30 | Refills: 3 | Status: DISCONTINUED | COMMUNITY
Start: 1900-01-01 | End: 2021-08-05

## 2021-08-05 NOTE — HISTORY OF PRESENT ILLNESS
[FreeTextEntry1] : Alert and orientated x 3 \par Accompanied by Lennox Ivy [phone]\par Fully covid vaccinated \par Meds \par Pregnancy test negative 8/5/2021 [FreeTextEntry5] : Yesterday 11pm  [FreeTextEntry6] : Dr. Maradiaga

## 2021-08-05 NOTE — ASSESSMENT
[FreeTextEntry1] : Pt seen and assessed for placement of port for venous access.  Chart reviewed, imaging and labs evaluated and acceptable for intervention. Consent obtained with risks, benefits explained.  Will place port with sedation.\par \par 8 Telugu powerport placed using US and fluoro guidance.  Tip in SVC.  May use immediately.  EBL=minimal.  Full report to follow.\par

## 2021-08-05 NOTE — PAST MEDICAL HISTORY
[Malignancy] : Malignancy [No therapy indicated for cases scheduled for less than one hour] : No therapy indicated for cases scheduled for less than one hour. [FreeTextEntry1] : Malignant Hyperthermia Screening Tool and Risk of Bleeding Assessment\par Ms. GAVIN JEAN denies family history of unexpected death following Anesthesia or Exercise.\par Denies Family history of Malignant Hyperthermia, Muscle or Neuromuscular disorder and High Temperature following exercise.\par \par Ms. GAVIN JEAN denies history of Muscle Spasm, Dark or Chocolate - Colored urine and Unanticipated fever immediately following anesthesia or serious exercise. \par Ms. JEAN also denies bleeding tendencies/ Risks of Bleeding.

## 2021-08-06 ENCOUNTER — RESULT REVIEW (OUTPATIENT)
Age: 31
End: 2021-08-06

## 2021-08-11 ENCOUNTER — NON-APPOINTMENT (OUTPATIENT)
Age: 31
End: 2021-08-11

## 2021-08-11 ENCOUNTER — RESULT REVIEW (OUTPATIENT)
Age: 31
End: 2021-08-11

## 2021-08-11 ENCOUNTER — APPOINTMENT (OUTPATIENT)
Dept: HEMATOLOGY ONCOLOGY | Facility: CLINIC | Age: 31
End: 2021-08-11
Payer: MEDICAID

## 2021-08-11 ENCOUNTER — APPOINTMENT (OUTPATIENT)
Dept: INFUSION THERAPY | Facility: HOSPITAL | Age: 31
End: 2021-08-11

## 2021-08-11 DIAGNOSIS — R23.8 OTHER SKIN CHANGES: ICD-10-CM

## 2021-08-11 DIAGNOSIS — Z51.11 ENCOUNTER FOR ANTINEOPLASTIC CHEMOTHERAPY: ICD-10-CM

## 2021-08-11 DIAGNOSIS — R23.2 FLUSHING: ICD-10-CM

## 2021-08-11 DIAGNOSIS — T78.40XA ALLERGY, UNSPECIFIED, INITIAL ENCOUNTER: ICD-10-CM

## 2021-08-11 LAB
BASOPHILS # BLD AUTO: 0.01 K/UL — SIGNIFICANT CHANGE UP (ref 0–0.2)
BASOPHILS NFR BLD AUTO: 0.1 % — SIGNIFICANT CHANGE UP (ref 0–2)
EOSINOPHIL # BLD AUTO: 0 K/UL — SIGNIFICANT CHANGE UP (ref 0–0.5)
EOSINOPHIL NFR BLD AUTO: 0 % — SIGNIFICANT CHANGE UP (ref 0–6)
HCT VFR BLD CALC: 36.7 % — SIGNIFICANT CHANGE UP (ref 34.5–45)
HGB BLD-MCNC: 12 G/DL — SIGNIFICANT CHANGE UP (ref 11.5–15.5)
IMM GRANULOCYTES NFR BLD AUTO: 0.8 % — SIGNIFICANT CHANGE UP (ref 0–1.5)
LYMPHOCYTES # BLD AUTO: 1.2 K/UL — SIGNIFICANT CHANGE UP (ref 1–3.3)
LYMPHOCYTES # BLD AUTO: 15.2 % — SIGNIFICANT CHANGE UP (ref 13–44)
MCHC RBC-ENTMCNC: 28.2 PG — SIGNIFICANT CHANGE UP (ref 27–34)
MCHC RBC-ENTMCNC: 32.7 G/DL — SIGNIFICANT CHANGE UP (ref 32–36)
MCV RBC AUTO: 86.4 FL — SIGNIFICANT CHANGE UP (ref 80–100)
MONOCYTES # BLD AUTO: 0.1 K/UL — SIGNIFICANT CHANGE UP (ref 0–0.9)
MONOCYTES NFR BLD AUTO: 1.3 % — LOW (ref 2–14)
NEUTROPHILS # BLD AUTO: 6.54 K/UL — SIGNIFICANT CHANGE UP (ref 1.8–7.4)
NEUTROPHILS NFR BLD AUTO: 82.6 % — HIGH (ref 43–77)
NRBC # BLD: 0 /100 WBCS — SIGNIFICANT CHANGE UP (ref 0–0)
PLATELET # BLD AUTO: 264 K/UL — SIGNIFICANT CHANGE UP (ref 150–400)
RBC # BLD: 4.25 M/UL — SIGNIFICANT CHANGE UP (ref 3.8–5.2)
RBC # FLD: 12.6 % — SIGNIFICANT CHANGE UP (ref 10.3–14.5)
WBC # BLD: 7.91 K/UL — SIGNIFICANT CHANGE UP (ref 3.8–10.5)
WBC # FLD AUTO: 7.91 K/UL — SIGNIFICANT CHANGE UP (ref 3.8–10.5)

## 2021-08-11 PROCEDURE — 99213 OFFICE O/P EST LOW 20 MIN: CPT

## 2021-08-12 ENCOUNTER — NON-APPOINTMENT (OUTPATIENT)
Age: 31
End: 2021-08-12

## 2021-08-12 DIAGNOSIS — R11.2 NAUSEA WITH VOMITING, UNSPECIFIED: ICD-10-CM

## 2021-08-12 DIAGNOSIS — Z51.11 ENCOUNTER FOR ANTINEOPLASTIC CHEMOTHERAPY: ICD-10-CM

## 2021-08-16 PROBLEM — C56.9 MALIGNANT NEOPLASM OF UNSPECIFIED OVARY: Chronic | Status: ACTIVE | Noted: 2021-08-09

## 2021-08-17 PROBLEM — R23.2 FACIAL FLUSHING: Status: ACTIVE | Noted: 2021-08-17

## 2021-08-17 PROBLEM — Z51.11 ENCOUNTER FOR ANTINEOPLASTIC CHEMOTHERAPY: Status: ACTIVE | Noted: 2021-08-17

## 2021-08-17 PROBLEM — T78.40XA HYPERSENSITIVITY REACTION, INITIAL ENCOUNTER: Status: ACTIVE | Noted: 2021-08-17

## 2021-08-17 PROBLEM — R23.8 LOCALIZED WARMTH OF SKIN: Status: ACTIVE | Noted: 2021-08-17

## 2021-08-24 ENCOUNTER — RESULT REVIEW (OUTPATIENT)
Age: 31
End: 2021-08-24

## 2021-08-24 ENCOUNTER — LABORATORY RESULT (OUTPATIENT)
Age: 31
End: 2021-08-24

## 2021-08-24 ENCOUNTER — APPOINTMENT (OUTPATIENT)
Dept: HEMATOLOGY ONCOLOGY | Facility: CLINIC | Age: 31
End: 2021-08-24
Payer: MEDICAID

## 2021-08-24 ENCOUNTER — APPOINTMENT (OUTPATIENT)
Dept: INFUSION THERAPY | Facility: HOSPITAL | Age: 31
End: 2021-08-24

## 2021-08-24 VITALS
RESPIRATION RATE: 16 BRPM | BODY MASS INDEX: 37.91 KG/M2 | DIASTOLIC BLOOD PRESSURE: 83 MMHG | SYSTOLIC BLOOD PRESSURE: 120 MMHG | TEMPERATURE: 97.4 F | WEIGHT: 200.62 LBS | OXYGEN SATURATION: 98 % | HEART RATE: 103 BPM

## 2021-08-24 LAB
BASOPHILS # BLD AUTO: 0.04 K/UL — SIGNIFICANT CHANGE UP (ref 0–0.2)
BASOPHILS NFR BLD AUTO: 0.9 % — SIGNIFICANT CHANGE UP (ref 0–2)
EOSINOPHIL # BLD AUTO: 0.18 K/UL — SIGNIFICANT CHANGE UP (ref 0–0.5)
EOSINOPHIL NFR BLD AUTO: 3.9 % — SIGNIFICANT CHANGE UP (ref 0–6)
HCT VFR BLD CALC: 34.8 % — SIGNIFICANT CHANGE UP (ref 34.5–45)
HGB BLD-MCNC: 11 G/DL — LOW (ref 11.5–15.5)
IMM GRANULOCYTES NFR BLD AUTO: 0.2 % — SIGNIFICANT CHANGE UP (ref 0–1.5)
LYMPHOCYTES # BLD AUTO: 2.51 K/UL — SIGNIFICANT CHANGE UP (ref 1–3.3)
LYMPHOCYTES # BLD AUTO: 54.2 % — HIGH (ref 13–44)
MCHC RBC-ENTMCNC: 27.6 PG — SIGNIFICANT CHANGE UP (ref 27–34)
MCHC RBC-ENTMCNC: 31.6 G/DL — LOW (ref 32–36)
MCV RBC AUTO: 87.4 FL — SIGNIFICANT CHANGE UP (ref 80–100)
MONOCYTES # BLD AUTO: 0.73 K/UL — SIGNIFICANT CHANGE UP (ref 0–0.9)
MONOCYTES NFR BLD AUTO: 15.8 % — HIGH (ref 2–14)
NEUTROPHILS # BLD AUTO: 1.16 K/UL — LOW (ref 1.8–7.4)
NEUTROPHILS NFR BLD AUTO: 25 % — LOW (ref 43–77)
NRBC # BLD: 0 /100 WBCS — SIGNIFICANT CHANGE UP (ref 0–0)
PLATELET # BLD AUTO: 297 K/UL — SIGNIFICANT CHANGE UP (ref 150–400)
RBC # BLD: 3.98 M/UL — SIGNIFICANT CHANGE UP (ref 3.8–5.2)
RBC # FLD: 12.5 % — SIGNIFICANT CHANGE UP (ref 10.3–14.5)
WBC # BLD: 4.63 K/UL — SIGNIFICANT CHANGE UP (ref 3.8–10.5)
WBC # FLD AUTO: 4.63 K/UL — SIGNIFICANT CHANGE UP (ref 3.8–10.5)

## 2021-08-24 PROCEDURE — 99214 OFFICE O/P EST MOD 30 MIN: CPT

## 2021-08-24 NOTE — REASON FOR VISIT
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

## 2021-08-24 NOTE — HISTORY OF PRESENT ILLNESS
[Disease: _____________________] : Disease: [unfilled] [AJCC Stage: ____] : AJCC Stage: [unfilled] [de-identified] : Referred by Dr. Magalys Fraire\par \par Ms. 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   [FreeTextEntry1] : surgery (7/6/21)  --> Carbo/Taxol started 8/11/2021 s/p cycle 1 [de-identified] : Neli comes in for follow up after C1 carbo/taxol. She had a Taxol reaction about 10 minutes into the infusion with facial flushing and a warmth sensation. Her symptoms have since then completely resolved. She did have several side effects following chemotherapy, including increased fatigue, mild nausea resolved with compazine, increased constipation, and myalgias/arthralgias of the legs b/l. Taste changes and decreased appetite for the first week, however was able to eat/drink sufficiently. Water tastes differently, but orange juice has been tolerable. Transient tingling sensation of the fingers b/l, resolved. Felt like she had a yeast infection (vaginal irritation) but also resolved at this time. Feeling overwhelmed with her diagnosis and going through treatment; as well as this past week found out about her father's diagnosis of metastatic lung cancer. Would like to get additional support although she feels that there is good support from her family/friends.\par \par She denies fevers, chills, n/v, abdominal pain, neuropathy, vaginal discharge or bleeding, urinary symptoms, cough, CP, SOB.\par

## 2021-08-24 NOTE — PHYSICAL EXAM
[Fully active, able to carry on all pre-disease performance without restriction] : Status 0 - Fully active, able to carry on all pre-disease performance without restriction [Normal] : affect appropriate [de-identified] : vertical abdominal incision site c/d/i, healing well. Nontender, no HSM

## 2021-08-25 LAB
APPEARANCE: CLEAR
BILIRUBIN URINE: NEGATIVE
BLOOD URINE: NEGATIVE
COLOR: NORMAL
GLUCOSE QUALITATIVE U: NEGATIVE
KETONES URINE: NEGATIVE
LEUKOCYTE ESTERASE URINE: NEGATIVE
NITRITE URINE: NEGATIVE
PH URINE: 6
PROTEIN URINE: NORMAL
SPECIFIC GRAVITY URINE: 1.02
UROBILINOGEN URINE: NORMAL

## 2021-08-26 ENCOUNTER — NON-APPOINTMENT (OUTPATIENT)
Age: 31
End: 2021-08-26

## 2021-08-27 ENCOUNTER — NON-APPOINTMENT (OUTPATIENT)
Age: 31
End: 2021-08-27

## 2021-08-30 ENCOUNTER — OUTPATIENT (OUTPATIENT)
Dept: OUTPATIENT SERVICES | Facility: HOSPITAL | Age: 31
LOS: 1 days | Discharge: HOME | End: 2021-08-30

## 2021-08-30 DIAGNOSIS — C56.9 MALIGNANT NEOPLASM OF UNSPECIFIED OVARY: ICD-10-CM

## 2021-08-30 DIAGNOSIS — Z98.890 OTHER SPECIFIED POSTPROCEDURAL STATES: Chronic | ICD-10-CM

## 2021-09-01 ENCOUNTER — RESULT REVIEW (OUTPATIENT)
Age: 31
End: 2021-09-01

## 2021-09-01 ENCOUNTER — APPOINTMENT (OUTPATIENT)
Dept: INFUSION THERAPY | Facility: HOSPITAL | Age: 31
End: 2021-09-01

## 2021-09-01 ENCOUNTER — APPOINTMENT (OUTPATIENT)
Dept: GYNECOLOGIC ONCOLOGY | Facility: CLINIC | Age: 31
End: 2021-09-01
Payer: MEDICAID

## 2021-09-01 ENCOUNTER — NON-APPOINTMENT (OUTPATIENT)
Age: 31
End: 2021-09-01

## 2021-09-01 VITALS
TEMPERATURE: 97 F | BODY MASS INDEX: 37.76 KG/M2 | WEIGHT: 200 LBS | DIASTOLIC BLOOD PRESSURE: 88 MMHG | HEIGHT: 61 IN | SYSTOLIC BLOOD PRESSURE: 127 MMHG | HEART RATE: 70 BPM

## 2021-09-01 LAB
BASOPHILS # BLD AUTO: 0.06 K/UL — SIGNIFICANT CHANGE UP (ref 0–0.2)
BASOPHILS NFR BLD AUTO: 0.6 % — SIGNIFICANT CHANGE UP (ref 0–2)
EOSINOPHIL # BLD AUTO: 0.11 K/UL — SIGNIFICANT CHANGE UP (ref 0–0.5)
EOSINOPHIL NFR BLD AUTO: 1.1 % — SIGNIFICANT CHANGE UP (ref 0–6)
HCT VFR BLD CALC: 36.1 % — SIGNIFICANT CHANGE UP (ref 34.5–45)
HGB BLD-MCNC: 12 G/DL — SIGNIFICANT CHANGE UP (ref 11.5–15.5)
IMM GRANULOCYTES NFR BLD AUTO: 1.5 % — SIGNIFICANT CHANGE UP (ref 0–1.5)
LYMPHOCYTES # BLD AUTO: 3.35 K/UL — HIGH (ref 1–3.3)
LYMPHOCYTES # BLD AUTO: 34.2 % — SIGNIFICANT CHANGE UP (ref 13–44)
MCHC RBC-ENTMCNC: 28.3 PG — SIGNIFICANT CHANGE UP (ref 27–34)
MCHC RBC-ENTMCNC: 33.2 G/DL — SIGNIFICANT CHANGE UP (ref 32–36)
MCV RBC AUTO: 85.1 FL — SIGNIFICANT CHANGE UP (ref 80–100)
MONOCYTES # BLD AUTO: 0.78 K/UL — SIGNIFICANT CHANGE UP (ref 0–0.9)
MONOCYTES NFR BLD AUTO: 8 % — SIGNIFICANT CHANGE UP (ref 2–14)
NEUTROPHILS # BLD AUTO: 5.34 K/UL — SIGNIFICANT CHANGE UP (ref 1.8–7.4)
NEUTROPHILS NFR BLD AUTO: 54.6 % — SIGNIFICANT CHANGE UP (ref 43–77)
NRBC # BLD: 0 /100 WBCS — SIGNIFICANT CHANGE UP (ref 0–0)
PLATELET # BLD AUTO: 271 K/UL — SIGNIFICANT CHANGE UP (ref 150–400)
RBC # BLD: 4.24 M/UL — SIGNIFICANT CHANGE UP (ref 3.8–5.2)
RBC # FLD: 12.6 % — SIGNIFICANT CHANGE UP (ref 10.3–14.5)
WBC # BLD: 9.79 K/UL — SIGNIFICANT CHANGE UP (ref 3.8–10.5)
WBC # FLD AUTO: 9.79 K/UL — SIGNIFICANT CHANGE UP (ref 3.8–10.5)

## 2021-09-01 PROCEDURE — 99024 POSTOP FOLLOW-UP VISIT: CPT

## 2021-09-01 RX ORDER — DIPHENHYDRAMINE HCL 25 MG/1
25 TABLET, FILM COATED ORAL
Refills: 0 | Status: DISCONTINUED | COMMUNITY
End: 2021-09-01

## 2021-09-01 RX ORDER — DEXAMETHASONE 4 MG/1
4 TABLET ORAL
Qty: 10 | Refills: 0 | Status: DISCONTINUED | COMMUNITY
Start: 2021-08-02 | End: 2021-09-01

## 2021-09-01 RX ORDER — PROCHLORPERAZINE MALEATE 10 MG/1
10 TABLET ORAL
Qty: 30 | Refills: 2 | Status: DISCONTINUED | COMMUNITY
Start: 2021-08-02 | End: 2021-09-01

## 2021-09-01 RX ORDER — APIXABAN 2.5 MG/1
2.5 TABLET, FILM COATED ORAL
Refills: 0 | Status: DISCONTINUED | COMMUNITY
End: 2021-09-01

## 2021-09-01 RX ORDER — FAMOTIDINE 20 MG/1
20 TABLET, FILM COATED ORAL
Qty: 30 | Refills: 0 | Status: DISCONTINUED | COMMUNITY
Start: 2021-07-19 | End: 2021-09-01

## 2021-09-01 RX ORDER — NEOMYCIN SULFATE 500 MG/1
500 TABLET ORAL
Qty: 6 | Refills: 0 | Status: DISCONTINUED | COMMUNITY
End: 2021-09-01

## 2021-09-02 DIAGNOSIS — Z51.11 ENCOUNTER FOR ANTINEOPLASTIC CHEMOTHERAPY: ICD-10-CM

## 2021-09-02 DIAGNOSIS — R11.2 NAUSEA WITH VOMITING, UNSPECIFIED: ICD-10-CM

## 2021-09-03 ENCOUNTER — OUTPATIENT (OUTPATIENT)
Dept: OUTPATIENT SERVICES | Facility: HOSPITAL | Age: 31
LOS: 1 days | Discharge: HOME | End: 2021-09-03

## 2021-09-03 DIAGNOSIS — F43.20 ADJUSTMENT DISORDER, UNSPECIFIED: ICD-10-CM

## 2021-09-03 DIAGNOSIS — Z98.890 OTHER SPECIFIED POSTPROCEDURAL STATES: Chronic | ICD-10-CM

## 2021-09-08 ENCOUNTER — APPOINTMENT (OUTPATIENT)
Dept: HEMATOLOGY ONCOLOGY | Facility: CLINIC | Age: 31
End: 2021-09-08
Payer: MEDICAID

## 2021-09-08 PROCEDURE — 90791 PSYCH DIAGNOSTIC EVALUATION: CPT | Mod: 95

## 2021-09-10 ENCOUNTER — APPOINTMENT (OUTPATIENT)
Dept: HEMATOLOGY ONCOLOGY | Facility: CLINIC | Age: 31
End: 2021-09-10
Payer: MEDICAID

## 2021-09-10 ENCOUNTER — NON-APPOINTMENT (OUTPATIENT)
Age: 31
End: 2021-09-10

## 2021-09-10 DIAGNOSIS — J06.9 ACUTE UPPER RESPIRATORY INFECTION, UNSPECIFIED: ICD-10-CM

## 2021-09-10 PROCEDURE — 99441: CPT

## 2021-09-13 ENCOUNTER — LABORATORY RESULT (OUTPATIENT)
Age: 31
End: 2021-09-13

## 2021-09-13 ENCOUNTER — RESULT REVIEW (OUTPATIENT)
Age: 31
End: 2021-09-13

## 2021-09-13 ENCOUNTER — APPOINTMENT (OUTPATIENT)
Dept: HEMATOLOGY ONCOLOGY | Facility: CLINIC | Age: 31
End: 2021-09-13
Payer: MEDICAID

## 2021-09-13 ENCOUNTER — APPOINTMENT (OUTPATIENT)
Dept: INFUSION THERAPY | Facility: HOSPITAL | Age: 31
End: 2021-09-13

## 2021-09-13 VITALS
DIASTOLIC BLOOD PRESSURE: 87 MMHG | BODY MASS INDEX: 37.91 KG/M2 | SYSTOLIC BLOOD PRESSURE: 128 MMHG | HEART RATE: 70 BPM | TEMPERATURE: 97.3 F | WEIGHT: 200.62 LBS | RESPIRATION RATE: 16 BRPM | OXYGEN SATURATION: 99 %

## 2021-09-13 PROBLEM — J06.9 URI (UPPER RESPIRATORY INFECTION): Status: RESOLVED | Noted: 2021-09-10 | Resolved: 2021-10-10

## 2021-09-13 LAB
BASOPHILS # BLD AUTO: 0.03 K/UL — SIGNIFICANT CHANGE UP (ref 0–0.2)
BASOPHILS NFR BLD AUTO: 0.5 % — SIGNIFICANT CHANGE UP (ref 0–2)
EOSINOPHIL # BLD AUTO: 0.08 K/UL — SIGNIFICANT CHANGE UP (ref 0–0.5)
EOSINOPHIL NFR BLD AUTO: 1.3 % — SIGNIFICANT CHANGE UP (ref 0–6)
HCT VFR BLD CALC: 32.9 % — LOW (ref 34.5–45)
HGB BLD-MCNC: 10.5 G/DL — LOW (ref 11.5–15.5)
IMM GRANULOCYTES NFR BLD AUTO: 0.3 % — SIGNIFICANT CHANGE UP (ref 0–1.5)
LYMPHOCYTES # BLD AUTO: 2.77 K/UL — SIGNIFICANT CHANGE UP (ref 1–3.3)
LYMPHOCYTES # BLD AUTO: 43.5 % — SIGNIFICANT CHANGE UP (ref 13–44)
MCHC RBC-ENTMCNC: 27.7 PG — SIGNIFICANT CHANGE UP (ref 27–34)
MCHC RBC-ENTMCNC: 31.9 G/DL — LOW (ref 32–36)
MCV RBC AUTO: 86.8 FL — SIGNIFICANT CHANGE UP (ref 80–100)
MONOCYTES # BLD AUTO: 0.88 K/UL — SIGNIFICANT CHANGE UP (ref 0–0.9)
MONOCYTES NFR BLD AUTO: 13.8 % — SIGNIFICANT CHANGE UP (ref 2–14)
NEUTROPHILS # BLD AUTO: 2.59 K/UL — SIGNIFICANT CHANGE UP (ref 1.8–7.4)
NEUTROPHILS NFR BLD AUTO: 40.6 % — LOW (ref 43–77)
NRBC # BLD: 0 /100 WBCS — SIGNIFICANT CHANGE UP (ref 0–0)
PLATELET # BLD AUTO: 210 K/UL — SIGNIFICANT CHANGE UP (ref 150–400)
RBC # BLD: 3.79 M/UL — LOW (ref 3.8–5.2)
RBC # FLD: 12.8 % — SIGNIFICANT CHANGE UP (ref 10.3–14.5)
WBC # BLD: 6.37 K/UL — SIGNIFICANT CHANGE UP (ref 3.8–10.5)
WBC # FLD AUTO: 6.37 K/UL — SIGNIFICANT CHANGE UP (ref 3.8–10.5)

## 2021-09-13 PROCEDURE — 99214 OFFICE O/P EST MOD 30 MIN: CPT

## 2021-09-21 DIAGNOSIS — F43.23 ADJUSTMENT DISORDER WITH MIXED ANXIETY AND DEPRESSED MOOD: ICD-10-CM

## 2021-09-22 ENCOUNTER — RESULT REVIEW (OUTPATIENT)
Age: 31
End: 2021-09-22

## 2021-09-22 ENCOUNTER — APPOINTMENT (OUTPATIENT)
Dept: INFUSION THERAPY | Facility: HOSPITAL | Age: 31
End: 2021-09-22

## 2021-09-22 ENCOUNTER — NON-APPOINTMENT (OUTPATIENT)
Age: 31
End: 2021-09-22

## 2021-09-22 LAB
BASOPHILS # BLD AUTO: 0.06 K/UL — SIGNIFICANT CHANGE UP (ref 0–0.2)
BASOPHILS NFR BLD AUTO: 0.7 % — SIGNIFICANT CHANGE UP (ref 0–2)
EOSINOPHIL # BLD AUTO: 0.05 K/UL — SIGNIFICANT CHANGE UP (ref 0–0.5)
EOSINOPHIL NFR BLD AUTO: 0.5 % — SIGNIFICANT CHANGE UP (ref 0–6)
HCT VFR BLD CALC: 35.9 % — SIGNIFICANT CHANGE UP (ref 34.5–45)
HGB BLD-MCNC: 11.8 G/DL — SIGNIFICANT CHANGE UP (ref 11.5–15.5)
IMM GRANULOCYTES NFR BLD AUTO: 0.8 % — SIGNIFICANT CHANGE UP (ref 0–1.5)
LYMPHOCYTES # BLD AUTO: 3.28 K/UL — SIGNIFICANT CHANGE UP (ref 1–3.3)
LYMPHOCYTES # BLD AUTO: 35.8 % — SIGNIFICANT CHANGE UP (ref 13–44)
MCHC RBC-ENTMCNC: 27.9 PG — SIGNIFICANT CHANGE UP (ref 27–34)
MCHC RBC-ENTMCNC: 32.9 G/DL — SIGNIFICANT CHANGE UP (ref 32–36)
MCV RBC AUTO: 84.9 FL — SIGNIFICANT CHANGE UP (ref 80–100)
MONOCYTES # BLD AUTO: 0.59 K/UL — SIGNIFICANT CHANGE UP (ref 0–0.9)
MONOCYTES NFR BLD AUTO: 6.4 % — SIGNIFICANT CHANGE UP (ref 2–14)
NEUTROPHILS # BLD AUTO: 5.11 K/UL — SIGNIFICANT CHANGE UP (ref 1.8–7.4)
NEUTROPHILS NFR BLD AUTO: 55.8 % — SIGNIFICANT CHANGE UP (ref 43–77)
NRBC # BLD: 0 /100 WBCS — SIGNIFICANT CHANGE UP (ref 0–0)
PLATELET # BLD AUTO: 234 K/UL — SIGNIFICANT CHANGE UP (ref 150–400)
RBC # BLD: 4.23 M/UL — SIGNIFICANT CHANGE UP (ref 3.8–5.2)
RBC # FLD: 13.3 % — SIGNIFICANT CHANGE UP (ref 10.3–14.5)
WBC # BLD: 9.16 K/UL — SIGNIFICANT CHANGE UP (ref 3.8–10.5)
WBC # FLD AUTO: 9.16 K/UL — SIGNIFICANT CHANGE UP (ref 3.8–10.5)

## 2021-09-23 ENCOUNTER — NON-APPOINTMENT (OUTPATIENT)
Age: 31
End: 2021-09-23

## 2021-09-27 ENCOUNTER — APPOINTMENT (OUTPATIENT)
Dept: HEMATOLOGY ONCOLOGY | Facility: CLINIC | Age: 31
End: 2021-09-27
Payer: MEDICAID

## 2021-09-27 VITALS
HEIGHT: 61.81 IN | BODY MASS INDEX: 35.74 KG/M2 | SYSTOLIC BLOOD PRESSURE: 105 MMHG | OXYGEN SATURATION: 96 % | DIASTOLIC BLOOD PRESSURE: 81 MMHG | HEART RATE: 115 BPM | RESPIRATION RATE: 16 BRPM | TEMPERATURE: 97 F | WEIGHT: 194.21 LBS

## 2021-09-27 DIAGNOSIS — K21.9 GASTRO-ESOPHAGEAL REFLUX DISEASE W/OUT ESOPHAGITIS: ICD-10-CM

## 2021-09-27 DIAGNOSIS — R11.0 NAUSEA: ICD-10-CM

## 2021-09-27 DIAGNOSIS — N83.202 UNSPECIFIED OVARIAN CYST, RIGHT SIDE: ICD-10-CM

## 2021-09-27 DIAGNOSIS — N83.201 UNSPECIFIED OVARIAN CYST, RIGHT SIDE: ICD-10-CM

## 2021-09-27 PROCEDURE — 99203 OFFICE O/P NEW LOW 30 MIN: CPT

## 2021-09-28 PROBLEM — R11.0 NAUSEA: Status: ACTIVE | Noted: 2021-09-28

## 2021-09-28 NOTE — DATA REVIEWED
[FreeTextEntry1] : CT A/P (6/2021):\par Lungs and large airways: Perifissural micronodule right minor fissure, consistent with benign intrapulmonary lymph node.\par \par Pleura: No pleural effusion.\par \par Thoracic adenopathy: There are a few subcentimeter right anterior diaphragmatic lymph nodes, the largest measuring 0.7 cm.\par \par Heart and pericardium: Heart size is normal. No pericardial effusion.\par \par Vessels: Retroaortic left renal vein.\par \par Chest wall and lower neck: Normal.\par \par Liver: Normal.\par \par Gallbladder: No radiopaque stones in the gallbladder.\par \par Spleen: Normal.\par \par Pancreas: Normal.\par \par Adrenal glands: Normal.\par \par Kidneys: Subcentimeter hypodensity lower pole left kidney, probably cyst. There are 4 mm and 2 mm nonobstructing stones in the left kidney. Right kidney unremarkable.\par \par Abdominal and pelvic adenopathy: There are a few borderline sized lymph nodes in the right upper quadrant, with 1.0 cm and 1.1 cm periportal lymph nodes and a 1.1 cm portocaval lymph node.\par \par Peritoneum/omentum: Small pelvic ascites. 1.2 x 0.7 cm omental nodule mid upper abdomen. Small pneumoperitoneum, likely postoperative in nature.\par \par Gastrointestinal tract: There are a few right-sided colonic diverticula.\par \par Pelvic organs: Post left oophorectomy. Suture material adjacent to right ovary, consistent with cystectomy. Suture material adjacent to uterus. Infiltration of fat in anterior pelvis, may represent postoperative change.\par \par Soft tissues: Infiltration of subcutaneous fat anterior abdominal wall and in periumbilical region and small subcutaneous emphysema, consistent with postoperative change.\par \par Bones: Osteitis condensans ilii bilaterally.\par \par Impression:\par 1. A 1.2 cm omental nodule is present.\par 2. No lung metastases.\par 3. Borderline sized lymph nodes right upper quadrant and right anterior diaphragmatic region, of uncertain significance.\par 4. Small ascites.
No lymphadedenopathy

## 2021-09-28 NOTE — ASSESSMENT
[FreeTextEntry1] : 31yoF with:\par \par 1. Serous Ovarian Carcinoma - s/p LINCOLN BSO now on adjuvant Carbo/Taxol. \par \par 2. Nausea - Medical cannabis certification completed today. Provided cannabis education, overview of state program, and discussed adverse effects in detail. Counseled that vaporized cannabis is not the preferred route of administration due to the fact that both short-term and long-term risks associated with vaporizing oils are not yet fully understood. Recommend starting with 1:1 THC:CBD formulation at low dose of THC (~2mg/dose).\par \par 3. GERD - Start Pepcid\par \par 4. Encounter for Palliative Care - Emotional support provided.\par \par Follow up in 1 month or as needed, call with questions or issues.

## 2021-09-28 NOTE — PHYSICAL EXAM
[General Appearance - Alert] : alert [General Appearance - In No Acute Distress] : in no acute distress [Oriented To Time, Place, And Person] : oriented to person, place, and time [Affect] : the affect was normal [Sclera] : the sclera and conjunctiva were normal [Normal Oral Mucosa] : normal oral mucosa [Neck Appearance] : the appearance of the neck was normal [] : no respiratory distress [Auscultation Breath Sounds / Voice Sounds] : lungs were clear to auscultation bilaterally [Heart Rate And Rhythm] : heart rate was normal and rhythm regular [Heart Sounds] : normal S1 and S2 [Bowel Sounds] : normal bowel sounds [Abdomen Soft] : soft [Abdomen Tenderness] : non-tender [FreeTextEntry1] : +alopecia [No Focal Deficits] : no focal deficits

## 2021-09-28 NOTE — HISTORY OF PRESENT ILLNESS
[FreeTextEntry1] : 31yoF with Stage IIIC serous ovarian cancer presents for initial palliative care visit, referred by Oncology team.\par PMH also significant for asthma. \par \par Patient is s/p Robotic assisted laparoscopic left salpingo-oophorectomy, right ovarian cystectomy, pelvic washings and tumor debulking on 6/15/21.\par \par She is receiving adjuvant Carbo/Taxol, is s/p 3 cycles to date. \par She feels a multitude of symptoms that start about 2-3 days after receiving chemotherapy.  Is interested in incorporating medicinal cannabis into her regimen for symptom control.\par \par ROS:\par +headache - mainly frontal; uses Aleve PRN \par +nausea -  uses prochlorperazine PRN which helped initially, not during the last cycle though.\par +heartburn - has used tums and marain seltzer but the taste makes her nauseous\par +arthralgias - mainly knees\par +trouble sleeping - she obtained cannabis gummies from her brother in [place] - this has helped.\par +hands fall asleep, occasional numbness\par +alternating constipation, diarrhea\par +low appetite\par +dysgeusia - cold foods taste better \par +mood has been OK, she's following with psycho-oncology service here at [facility]\par \par Patient is single, lives with her parents and her two brothers. Sometimes stays with her boyfriend. Her brother drives her to appointments, has not been driving lately. 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 She is Buddhism. \par \par PMD: Dr. Shin Garcia \[facility] \[facility] Ref#: [id number]

## 2021-09-29 ENCOUNTER — APPOINTMENT (OUTPATIENT)
Dept: HEMATOLOGY ONCOLOGY | Facility: CLINIC | Age: 31
End: 2021-09-29
Payer: MEDICAID

## 2021-09-29 PROCEDURE — 90834 PSYTX W PT 45 MINUTES: CPT | Mod: 95

## 2021-09-30 ENCOUNTER — OUTPATIENT (OUTPATIENT)
Dept: OUTPATIENT SERVICES | Facility: HOSPITAL | Age: 31
LOS: 1 days | Discharge: HOME | End: 2021-09-30

## 2021-09-30 DIAGNOSIS — C56.9 MALIGNANT NEOPLASM OF UNSPECIFIED OVARY: ICD-10-CM

## 2021-09-30 DIAGNOSIS — Z98.890 OTHER SPECIFIED POSTPROCEDURAL STATES: Chronic | ICD-10-CM

## 2021-10-04 ENCOUNTER — RESULT REVIEW (OUTPATIENT)
Age: 31
End: 2021-10-04

## 2021-10-04 ENCOUNTER — LABORATORY RESULT (OUTPATIENT)
Age: 31
End: 2021-10-04

## 2021-10-04 ENCOUNTER — APPOINTMENT (OUTPATIENT)
Dept: INFUSION THERAPY | Facility: HOSPITAL | Age: 31
End: 2021-10-04

## 2021-10-04 ENCOUNTER — APPOINTMENT (OUTPATIENT)
Dept: HEMATOLOGY ONCOLOGY | Facility: CLINIC | Age: 31
End: 2021-10-04
Payer: MEDICAID

## 2021-10-04 VITALS
DIASTOLIC BLOOD PRESSURE: 78 MMHG | OXYGEN SATURATION: 99 % | BODY MASS INDEX: 36.8 KG/M2 | TEMPERATURE: 97.2 F | HEART RATE: 98 BPM | WEIGHT: 200 LBS | RESPIRATION RATE: 16 BRPM | SYSTOLIC BLOOD PRESSURE: 112 MMHG

## 2021-10-04 LAB
BASOPHILS # BLD AUTO: 0.02 K/UL — SIGNIFICANT CHANGE UP (ref 0–0.2)
BASOPHILS NFR BLD AUTO: 0.4 % — SIGNIFICANT CHANGE UP (ref 0–2)
EOSINOPHIL # BLD AUTO: 0.05 K/UL — SIGNIFICANT CHANGE UP (ref 0–0.5)
EOSINOPHIL NFR BLD AUTO: 1 % — SIGNIFICANT CHANGE UP (ref 0–6)
HCT VFR BLD CALC: 32.4 % — LOW (ref 34.5–45)
HGB BLD-MCNC: 10.5 G/DL — LOW (ref 11.5–15.5)
IMM GRANULOCYTES NFR BLD AUTO: 0.2 % — SIGNIFICANT CHANGE UP (ref 0–1.5)
LYMPHOCYTES # BLD AUTO: 2.66 K/UL — SIGNIFICANT CHANGE UP (ref 1–3.3)
LYMPHOCYTES # BLD AUTO: 52.1 % — HIGH (ref 13–44)
MCHC RBC-ENTMCNC: 28.1 PG — SIGNIFICANT CHANGE UP (ref 27–34)
MCHC RBC-ENTMCNC: 32.4 G/DL — SIGNIFICANT CHANGE UP (ref 32–36)
MCV RBC AUTO: 86.6 FL — SIGNIFICANT CHANGE UP (ref 80–100)
MONOCYTES # BLD AUTO: 0.62 K/UL — SIGNIFICANT CHANGE UP (ref 0–0.9)
MONOCYTES NFR BLD AUTO: 12.1 % — SIGNIFICANT CHANGE UP (ref 2–14)
NEUTROPHILS # BLD AUTO: 1.75 K/UL — LOW (ref 1.8–7.4)
NEUTROPHILS NFR BLD AUTO: 34.2 % — LOW (ref 43–77)
NRBC # BLD: 0 /100 WBCS — SIGNIFICANT CHANGE UP (ref 0–0)
PLATELET # BLD AUTO: 203 K/UL — SIGNIFICANT CHANGE UP (ref 150–400)
RBC # BLD: 3.74 M/UL — LOW (ref 3.8–5.2)
RBC # FLD: 13.2 % — SIGNIFICANT CHANGE UP (ref 10.3–14.5)
WBC # BLD: 5.11 K/UL — SIGNIFICANT CHANGE UP (ref 3.8–10.5)
WBC # FLD AUTO: 5.11 K/UL — SIGNIFICANT CHANGE UP (ref 3.8–10.5)

## 2021-10-04 PROCEDURE — 99214 OFFICE O/P EST MOD 30 MIN: CPT

## 2021-10-13 ENCOUNTER — RESULT REVIEW (OUTPATIENT)
Age: 31
End: 2021-10-13

## 2021-10-13 ENCOUNTER — APPOINTMENT (OUTPATIENT)
Dept: INFUSION THERAPY | Facility: HOSPITAL | Age: 31
End: 2021-10-13

## 2021-10-13 ENCOUNTER — NON-APPOINTMENT (OUTPATIENT)
Age: 31
End: 2021-10-13

## 2021-10-13 LAB
BASOPHILS # BLD AUTO: 0.03 K/UL — SIGNIFICANT CHANGE UP (ref 0–0.2)
BASOPHILS NFR BLD AUTO: 0.4 % — SIGNIFICANT CHANGE UP (ref 0–2)
EOSINOPHIL # BLD AUTO: 0.04 K/UL — SIGNIFICANT CHANGE UP (ref 0–0.5)
EOSINOPHIL NFR BLD AUTO: 0.6 % — SIGNIFICANT CHANGE UP (ref 0–6)
HCT VFR BLD CALC: 34.7 % — SIGNIFICANT CHANGE UP (ref 34.5–45)
HGB BLD-MCNC: 11.5 G/DL — SIGNIFICANT CHANGE UP (ref 11.5–15.5)
IMM GRANULOCYTES NFR BLD AUTO: 0.9 % — SIGNIFICANT CHANGE UP (ref 0–1.5)
LYMPHOCYTES # BLD AUTO: 2.79 K/UL — SIGNIFICANT CHANGE UP (ref 1–3.3)
LYMPHOCYTES # BLD AUTO: 39.9 % — SIGNIFICANT CHANGE UP (ref 13–44)
MCHC RBC-ENTMCNC: 29 PG — SIGNIFICANT CHANGE UP (ref 27–34)
MCHC RBC-ENTMCNC: 33.1 G/DL — SIGNIFICANT CHANGE UP (ref 32–36)
MCV RBC AUTO: 87.4 FL — SIGNIFICANT CHANGE UP (ref 80–100)
MONOCYTES # BLD AUTO: 0.53 K/UL — SIGNIFICANT CHANGE UP (ref 0–0.9)
MONOCYTES NFR BLD AUTO: 7.6 % — SIGNIFICANT CHANGE UP (ref 2–14)
NEUTROPHILS # BLD AUTO: 3.55 K/UL — SIGNIFICANT CHANGE UP (ref 1.8–7.4)
NEUTROPHILS NFR BLD AUTO: 50.6 % — SIGNIFICANT CHANGE UP (ref 43–77)
NRBC # BLD: 0 /100 WBCS — SIGNIFICANT CHANGE UP (ref 0–0)
PLATELET # BLD AUTO: 220 K/UL — SIGNIFICANT CHANGE UP (ref 150–400)
RBC # BLD: 3.97 M/UL — SIGNIFICANT CHANGE UP (ref 3.8–5.2)
RBC # FLD: 14.4 % — SIGNIFICANT CHANGE UP (ref 10.3–14.5)
WBC # BLD: 7 K/UL — SIGNIFICANT CHANGE UP (ref 3.8–10.5)
WBC # FLD AUTO: 7 K/UL — SIGNIFICANT CHANGE UP (ref 3.8–10.5)

## 2021-10-14 DIAGNOSIS — R11.2 NAUSEA WITH VOMITING, UNSPECIFIED: ICD-10-CM

## 2021-10-14 DIAGNOSIS — Z51.11 ENCOUNTER FOR ANTINEOPLASTIC CHEMOTHERAPY: ICD-10-CM

## 2021-10-18 ENCOUNTER — OUTPATIENT (OUTPATIENT)
Dept: OUTPATIENT SERVICES | Facility: HOSPITAL | Age: 31
LOS: 1 days | Discharge: HOME | End: 2021-10-18

## 2021-10-18 DIAGNOSIS — F43.20 ADJUSTMENT DISORDER, UNSPECIFIED: ICD-10-CM

## 2021-10-18 DIAGNOSIS — Z98.890 OTHER SPECIFIED POSTPROCEDURAL STATES: Chronic | ICD-10-CM

## 2021-10-20 ENCOUNTER — APPOINTMENT (OUTPATIENT)
Dept: HEMATOLOGY ONCOLOGY | Facility: CLINIC | Age: 31
End: 2021-10-20
Payer: MEDICAID

## 2021-10-20 PROCEDURE — 90834 PSYTX W PT 45 MINUTES: CPT | Mod: 95

## 2021-10-22 ENCOUNTER — TRANSCRIPTION ENCOUNTER (OUTPATIENT)
Age: 31
End: 2021-10-22

## 2021-10-27 DIAGNOSIS — F43.23 ADJUSTMENT DISORDER WITH MIXED ANXIETY AND DEPRESSED MOOD: ICD-10-CM

## 2021-10-28 ENCOUNTER — APPOINTMENT (OUTPATIENT)
Dept: INFUSION THERAPY | Facility: HOSPITAL | Age: 31
End: 2021-10-28

## 2021-10-28 ENCOUNTER — RESULT REVIEW (OUTPATIENT)
Age: 31
End: 2021-10-28

## 2021-10-28 ENCOUNTER — LABORATORY RESULT (OUTPATIENT)
Age: 31
End: 2021-10-28

## 2021-10-28 ENCOUNTER — APPOINTMENT (OUTPATIENT)
Dept: HEMATOLOGY ONCOLOGY | Facility: CLINIC | Age: 31
End: 2021-10-28
Payer: MEDICAID

## 2021-10-28 VITALS
OXYGEN SATURATION: 96 % | TEMPERATURE: 97.8 F | DIASTOLIC BLOOD PRESSURE: 82 MMHG | HEART RATE: 78 BPM | WEIGHT: 202.16 LBS | SYSTOLIC BLOOD PRESSURE: 125 MMHG | BODY MASS INDEX: 37.2 KG/M2 | RESPIRATION RATE: 16 BRPM | HEIGHT: 61.81 IN

## 2021-10-28 LAB
BASOPHILS # BLD AUTO: 0.05 K/UL — SIGNIFICANT CHANGE UP (ref 0–0.2)
BASOPHILS NFR BLD AUTO: 1 % — SIGNIFICANT CHANGE UP (ref 0–2)
EOSINOPHIL # BLD AUTO: 0.05 K/UL — SIGNIFICANT CHANGE UP (ref 0–0.5)
EOSINOPHIL NFR BLD AUTO: 1 % — SIGNIFICANT CHANGE UP (ref 0–6)
HCT VFR BLD CALC: 33.5 % — LOW (ref 34.5–45)
HGB BLD-MCNC: 10.9 G/DL — LOW (ref 11.5–15.5)
LYMPHOCYTES # BLD AUTO: 3.52 K/UL — HIGH (ref 1–3.3)
LYMPHOCYTES # BLD AUTO: 68 % — HIGH (ref 13–44)
MCHC RBC-ENTMCNC: 29.1 PG — SIGNIFICANT CHANGE UP (ref 27–34)
MCHC RBC-ENTMCNC: 32.5 G/DL — SIGNIFICANT CHANGE UP (ref 32–36)
MCV RBC AUTO: 89.3 FL — SIGNIFICANT CHANGE UP (ref 80–100)
MONOCYTES # BLD AUTO: 0.52 K/UL — SIGNIFICANT CHANGE UP (ref 0–0.9)
MONOCYTES NFR BLD AUTO: 10 % — SIGNIFICANT CHANGE UP (ref 2–14)
NEUTROPHILS # BLD AUTO: 1.03 K/UL — LOW (ref 1.8–7.4)
NEUTROPHILS NFR BLD AUTO: 20 % — LOW (ref 43–77)
NRBC # BLD: 0 /100 — SIGNIFICANT CHANGE UP (ref 0–0)
NRBC # BLD: SIGNIFICANT CHANGE UP /100 WBCS (ref 0–0)
PLAT MORPH BLD: NORMAL — SIGNIFICANT CHANGE UP
PLATELET # BLD AUTO: 249 K/UL — SIGNIFICANT CHANGE UP (ref 150–400)
RBC # BLD: 3.75 M/UL — LOW (ref 3.8–5.2)
RBC # FLD: 14.9 % — HIGH (ref 10.3–14.5)
RBC BLD AUTO: SIGNIFICANT CHANGE UP
WBC # BLD: 5.17 K/UL — SIGNIFICANT CHANGE UP (ref 3.8–10.5)
WBC # FLD AUTO: 5.17 K/UL — SIGNIFICANT CHANGE UP (ref 3.8–10.5)

## 2021-10-28 PROCEDURE — 99215 OFFICE O/P EST HI 40 MIN: CPT

## 2021-10-31 ENCOUNTER — OUTPATIENT (OUTPATIENT)
Dept: OUTPATIENT SERVICES | Facility: HOSPITAL | Age: 31
LOS: 1 days | Discharge: HOME | End: 2021-10-31

## 2021-10-31 DIAGNOSIS — C56.9 MALIGNANT NEOPLASM OF UNSPECIFIED OVARY: ICD-10-CM

## 2021-10-31 DIAGNOSIS — Z98.890 OTHER SPECIFIED POSTPROCEDURAL STATES: Chronic | ICD-10-CM

## 2021-11-03 ENCOUNTER — RESULT REVIEW (OUTPATIENT)
Age: 31
End: 2021-11-03

## 2021-11-03 ENCOUNTER — APPOINTMENT (OUTPATIENT)
Dept: INFUSION THERAPY | Facility: HOSPITAL | Age: 31
End: 2021-11-03

## 2021-11-03 ENCOUNTER — APPOINTMENT (OUTPATIENT)
Dept: HEMATOLOGY ONCOLOGY | Facility: CLINIC | Age: 31
End: 2021-11-03
Payer: MEDICAID

## 2021-11-03 ENCOUNTER — NON-APPOINTMENT (OUTPATIENT)
Age: 31
End: 2021-11-03

## 2021-11-03 LAB
BASOPHILS # BLD AUTO: 0.04 K/UL — SIGNIFICANT CHANGE UP (ref 0–0.2)
BASOPHILS NFR BLD AUTO: 0.6 % — SIGNIFICANT CHANGE UP (ref 0–2)
EOSINOPHIL # BLD AUTO: 0.03 K/UL — SIGNIFICANT CHANGE UP (ref 0–0.5)
EOSINOPHIL NFR BLD AUTO: 0.4 % — SIGNIFICANT CHANGE UP (ref 0–6)
HCT VFR BLD CALC: 35.1 % — SIGNIFICANT CHANGE UP (ref 34.5–45)
HGB BLD-MCNC: 11.5 G/DL — SIGNIFICANT CHANGE UP (ref 11.5–15.5)
IMM GRANULOCYTES NFR BLD AUTO: 0.7 % — SIGNIFICANT CHANGE UP (ref 0–1.5)
LYMPHOCYTES # BLD AUTO: 3.1 K/UL — SIGNIFICANT CHANGE UP (ref 1–3.3)
LYMPHOCYTES # BLD AUTO: 45.1 % — HIGH (ref 13–44)
MCHC RBC-ENTMCNC: 28.6 PG — SIGNIFICANT CHANGE UP (ref 27–34)
MCHC RBC-ENTMCNC: 32.8 G/DL — SIGNIFICANT CHANGE UP (ref 32–36)
MCV RBC AUTO: 87.3 FL — SIGNIFICANT CHANGE UP (ref 80–100)
MONOCYTES # BLD AUTO: 0.58 K/UL — SIGNIFICANT CHANGE UP (ref 0–0.9)
MONOCYTES NFR BLD AUTO: 8.4 % — SIGNIFICANT CHANGE UP (ref 2–14)
NEUTROPHILS # BLD AUTO: 3.08 K/UL — SIGNIFICANT CHANGE UP (ref 1.8–7.4)
NEUTROPHILS NFR BLD AUTO: 44.8 % — SIGNIFICANT CHANGE UP (ref 43–77)
NRBC # BLD: 0 /100 WBCS — SIGNIFICANT CHANGE UP (ref 0–0)
PLATELET # BLD AUTO: 219 K/UL — SIGNIFICANT CHANGE UP (ref 150–400)
RBC # BLD: 4.02 M/UL — SIGNIFICANT CHANGE UP (ref 3.8–5.2)
RBC # FLD: 14.6 % — HIGH (ref 10.3–14.5)
WBC # BLD: 6.88 K/UL — SIGNIFICANT CHANGE UP (ref 3.8–10.5)
WBC # FLD AUTO: 6.88 K/UL — SIGNIFICANT CHANGE UP (ref 3.8–10.5)

## 2021-11-03 PROCEDURE — 99214 OFFICE O/P EST MOD 30 MIN: CPT

## 2021-11-04 DIAGNOSIS — R11.2 NAUSEA WITH VOMITING, UNSPECIFIED: ICD-10-CM

## 2021-11-04 DIAGNOSIS — Z51.11 ENCOUNTER FOR ANTINEOPLASTIC CHEMOTHERAPY: ICD-10-CM

## 2021-11-10 ENCOUNTER — APPOINTMENT (OUTPATIENT)
Dept: HEMATOLOGY ONCOLOGY | Facility: CLINIC | Age: 31
End: 2021-11-10
Payer: MEDICAID

## 2021-11-10 PROCEDURE — 90834 PSYTX W PT 45 MINUTES: CPT | Mod: 95

## 2021-11-15 ENCOUNTER — APPOINTMENT (OUTPATIENT)
Dept: HEMATOLOGY ONCOLOGY | Facility: CLINIC | Age: 31
End: 2021-11-15
Payer: MEDICAID

## 2021-11-15 NOTE — HISTORY OF PRESENT ILLNESS
[Disease: _____________________] : Disease: [unfilled] [AJCC Stage: ____] : AJCC Stage: [unfilled] [de-identified] : Referred by Dr. Magalys Fraire\par \par Ms. 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  \par \par 8/11/2021: Started adjuvant carbo/taxol  [de-identified] : [employer] genetic panel: MSH6 [id number] (VUS) [FreeTextEntry1] : surgery (7/6/21)  --> Adjuvant Carbo/Taxol started 8/11/2021 s/p cycle 4/6 [de-identified] : Neli comes in for follow up after C4 of carbo/taxol. and states since starting the Pepcid, she no longer has the cough.  She states even with the lower Benadryl dose, she still was drowsy this cycle but not as severe as the cycles prior.  She has nausea, fatigue and myalgias for 1 week after chemotherapy and medical marijuana, rest, Tyelnol/motrin help.  A week after her last chemotherapy, she noted multiple loose BMs for 3 days without fevers/chills but Imodium helped.  She also states she had painless bright red blood on the TP afterwards.   She denies fevers, chills, n/v, abdominal pain, neuropathy, vaginal discharge or bleeding, urinary symptoms, cough, CP, SOB.\par

## 2021-11-15 NOTE — PHYSICAL EXAM
[Fully active, able to carry on all pre-disease performance without restriction] : Status 0 - Fully active, able to carry on all pre-disease performance without restriction [Normal] : normal appearance, no rash, nodules, vesicles, ulcers, erythema [de-identified] : vertical abdominal incision site c/d/i, healing well. Nontender, no HSM

## 2021-11-15 NOTE — REVIEW OF SYSTEMS
[Fatigue] : fatigue [Negative] : Allergic/Immunologic [Recent Change In Weight] : ~T no recent weight change [FreeTextEntry2] : Myalgias [de-identified] : Numbness in hands and feet- grade 1

## 2021-11-17 ENCOUNTER — LABORATORY RESULT (OUTPATIENT)
Age: 31
End: 2021-11-17

## 2021-11-17 ENCOUNTER — RESULT REVIEW (OUTPATIENT)
Age: 31
End: 2021-11-17

## 2021-11-17 ENCOUNTER — APPOINTMENT (OUTPATIENT)
Dept: HEMATOLOGY ONCOLOGY | Facility: CLINIC | Age: 31
End: 2021-11-17
Payer: MEDICAID

## 2021-11-17 ENCOUNTER — APPOINTMENT (OUTPATIENT)
Dept: INFUSION THERAPY | Facility: HOSPITAL | Age: 31
End: 2021-11-17

## 2021-11-17 VITALS
WEIGHT: 200.05 LBS | SYSTOLIC BLOOD PRESSURE: 119 MMHG | TEMPERATURE: 97.2 F | OXYGEN SATURATION: 98 % | DIASTOLIC BLOOD PRESSURE: 86 MMHG | HEART RATE: 84 BPM | RESPIRATION RATE: 16 BRPM | BODY MASS INDEX: 37.77 KG/M2 | HEIGHT: 61 IN

## 2021-11-17 LAB
BASOPHILS # BLD AUTO: 0 K/UL — SIGNIFICANT CHANGE UP (ref 0–0.2)
BASOPHILS NFR BLD AUTO: 0 % — SIGNIFICANT CHANGE UP (ref 0–2)
EOSINOPHIL # BLD AUTO: 0.11 K/UL — SIGNIFICANT CHANGE UP (ref 0–0.5)
EOSINOPHIL NFR BLD AUTO: 2 % — SIGNIFICANT CHANGE UP (ref 0–6)
HCT VFR BLD CALC: 34.3 % — LOW (ref 34.5–45)
HGB BLD-MCNC: 11 G/DL — LOW (ref 11.5–15.5)
LYMPHOCYTES # BLD AUTO: 3.17 K/UL — SIGNIFICANT CHANGE UP (ref 1–3.3)
LYMPHOCYTES # BLD AUTO: 58 % — HIGH (ref 13–44)
MCHC RBC-ENTMCNC: 28.9 PG — SIGNIFICANT CHANGE UP (ref 27–34)
MCHC RBC-ENTMCNC: 32.1 G/DL — SIGNIFICANT CHANGE UP (ref 32–36)
MCV RBC AUTO: 90.3 FL — SIGNIFICANT CHANGE UP (ref 80–100)
MONOCYTES # BLD AUTO: 0.71 K/UL — SIGNIFICANT CHANGE UP (ref 0–0.9)
MONOCYTES NFR BLD AUTO: 13 % — SIGNIFICANT CHANGE UP (ref 2–14)
NEUTROPHILS # BLD AUTO: 1.47 K/UL — LOW (ref 1.8–7.4)
NEUTROPHILS NFR BLD AUTO: 27 % — LOW (ref 43–77)
NRBC # BLD: 0 /100 — SIGNIFICANT CHANGE UP (ref 0–0)
NRBC # BLD: SIGNIFICANT CHANGE UP /100 WBCS (ref 0–0)
PLAT MORPH BLD: NORMAL — SIGNIFICANT CHANGE UP
PLATELET # BLD AUTO: 249 K/UL — SIGNIFICANT CHANGE UP (ref 150–400)
RBC # BLD: 3.8 M/UL — SIGNIFICANT CHANGE UP (ref 3.8–5.2)
RBC # FLD: 15 % — HIGH (ref 10.3–14.5)
RBC BLD AUTO: SIGNIFICANT CHANGE UP
WBC # BLD: 5.46 K/UL — SIGNIFICANT CHANGE UP (ref 3.8–10.5)
WBC # FLD AUTO: 5.46 K/UL — SIGNIFICANT CHANGE UP (ref 3.8–10.5)

## 2021-11-17 PROCEDURE — 99214 OFFICE O/P EST MOD 30 MIN: CPT

## 2021-11-18 ENCOUNTER — APPOINTMENT (OUTPATIENT)
Dept: PEDIATRIC ALLERGY IMMUNOLOGY | Facility: CLINIC | Age: 31
End: 2021-11-18
Payer: MEDICAID

## 2021-11-18 DIAGNOSIS — T45.1X5A ADVERSE EFFECT OF ANTINEOPLASTIC AND IMMUNOSUPPRESSIVE DRUGS, INITIAL ENCOUNTER: ICD-10-CM

## 2021-11-18 PROCEDURE — 99204 OFFICE O/P NEW MOD 45 MIN: CPT | Mod: 95

## 2021-11-19 PROBLEM — T45.1X5A ADVERSE EFFECT OF CHEMOTHERAPY, INITIAL ENCOUNTER: Status: ACTIVE | Noted: 2021-11-19

## 2021-11-19 NOTE — PHYSICAL EXAM
[Alert] : alert [No Acute Distress] : no acute distress [Sclera Not Icteric] : sclera not icteric [Normal Rate and Effort] : normal respiratory rhythm and effort [Normal Mood] : mood was normal [Normal Affect] : affect was normal [Alert, Awake, Oriented as Age-Appropriate] : alert, awake, oriented as age appropriate

## 2021-11-19 NOTE — CONSULT LETTER
[Dear  ___] : Dear  [unfilled], [Consult Letter:] : I had the pleasure of evaluating your patient, [unfilled]. [Please see my note below.] : Please see my note below. [Consult Closing:] : Thank you very much for allowing me to participate in the care of this patient.  If you have any questions, please do not hesitate to contact me. [Sincerely,] : Sincerely, [FreeTextEntry3] : Cynthia Rizo MD FAAWENCESLAO, YADIEL\par Adult and Pediatric Allergy, Asthma and Clinical Immunology\par  of Medicine and Pediatrics at\par   [facility]\par Section Head, Adult Allergy and Immunology\par   [facility]\par   Division of Allergy, Asthma and Immunology\par   [address] [place] [address], [place] [address]\par   [place] [address]\par   Phone [phone]  Fax [phone]\par \par

## 2021-11-19 NOTE — HISTORY OF PRESENT ILLNESS
[Home] : at home, [unfilled] , at the time of the visit. [Other Location: e.g. 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 Towards the end of the infusion, she developed back pain, her hands were red and eyes were puffy. The infusion was stopped and she was treated with Diphenhydramine/Benadryl, famotidine and steroids with resolution of her symptoms within 10 minutes. \par \par Her next infusion is planned for 12/6/21. This will be the last infusion for now. \par \par \par From hematology hospital:\par - infusion immediately held and administered Benadryl 25mg IVP, Solu-Cortef 100 mg IVP and Pepcid 20mg IVPB and observed, given additional Benadryl 25mg IVP as her lower back pain without significant improvement even after 10 minutes of HSR meds. After few minutes of additional Benadryl, noted with easing up of the pain in the back. No other worsening sign/sx's. \par - Continued to observe patient, vitals remained stable, she returned from restroom and started c/o chest pain, and lower back pain again. No other complaints or sign's\par - Vitals stable, no acute PE finding, palmar erythema improved, given Additional solu-Cortef 50mg IVP and observed for one hour or more with complete resolution of her sign/sx's and no other complaints.\par

## 2021-11-19 NOTE — REVIEW OF SYSTEMS
[Fatigue] : fatigue [Fever] : no fever [Seizure] : no seizures [Nl] : Integumentary [FreeTextEntry9] : myalgias

## 2021-11-24 NOTE — ASSESSMENT
[FreeTextEntry1] : Ms. 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  \par \par We reviewed the patient's workup to date including imaging studies and surgical pathology findings that are c/w stage IIIC LG serous ovarian cancer. We also reviewed the rationale and role of adjuvant therapy, and the potential benefits and risks of systemic chemotherapy. We discussed schedule, administration and the possible side effects and toxicities of combination treatment with carboplatin and paclitaxel. These include but are not limited to: fatigue, nausea, vomiting, decreased appetite, alopecia, diarrhea, constipation, body aches, neuropathy, cytopenias with increased risk of infections, renal dysfunction, liver toxicities and allergic reactions. We also discussed the possible need for growth factors and supportive transfusions. We discussed our plans for 6 cycles of carboplatin/paclitaxel. \par \par I also discussed the role of genetic and tumor testing for BRCA and other potential mutations involved in ovarian cancer, and implications for maintenance treatment in the future. [employer] multi genetic panel was discussed and patient consented to this. \par \par After a lengthy discussion, the patient demonstrated good understanding of the above and is in agreement with the plan of care as outlined. The patient consented to chemotherapy with carboplatin and paclitaxel.\par \par Plan:\par -prechemotherapy bloodwork/EKG \par -[employer] multi gene panel testing \par -chemotherapy consent obtained \par -referral to IR for mediport placement \par -f/u Dr. Fraire in gyn onc clinic\par -tentatively plan to start treatment in 1-2 weeks\par -RTC after C1\par \par All of the patient's questions and concerns were addressed in full. Chemotherapy teaching and scheduling to follow.\par

## 2021-11-24 NOTE — HISTORY OF PRESENT ILLNESS
[de-identified] : Referred by Dr. Magalys Fraire\par \par Ms. 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  \par \par Pt. states she developed abdominal pain about a year ago. She presented to GYN in [place] and was advised she had a cyst on each ovary. She was prescribed birth control and advised to follow up in 3 months. She followed up in 3 months and was advised that cysts had become larger but there was nothing to do at that time and that should she experience severe pain to follow up with ER because this could signify cyst rupture. Patient decided to stop birth control pills because she was experiencing migraines, which stopped after stopping meds. \par \par Patient states she did go to the  ER in [place] because of severe pain and workup was normal and was discharged. She then traveled to [facility] in February and presented to ED there for severe pain. She was advised she had endometriosis and advised to follow up outpatient. Her insurance did not cover her primary GYN and was then referred to Dr. Christensen. \par \par She was subsequently referred to Dr. Fraire for gyn onc evaluation and underwent RA-laparoscopic LSO and R ovarian cystectomy on 6/15/21, followed by exp lap, LINCOLN-RSO, omentectomy, and optimal tumor debulking surgery (7/6/21). \par \par Final pathology:\par Final Diagnosis\par 1. Uterus, cervix and right ovary and fallopian tube (total hysterectomy and salpingo-oophorectomy):\par - Focal low-grade micropapillary serous carcinoma (best seen on slide 1O) arising in a borderline tumor of the ovary.\par - The overall size of the ovarian tumor is 2.5 x 2.0 x 1.2 cm.\par - Involvement of the ovarian surface by borderline tumor is identified.\par - Small invasive implants involving the ovary (slides 1M-O), uterine serosa (slide 1G), and the fallopian tube.\par - Chronic cervicitis and squamous metaplasia.\par - Secretory endometrium.\par - Uterine serosal adhesion and tubo-ovarian adhesion.\par - Post-surgery changes of the ovary.\par - Benign paratubal cysts.\par \par 2. Infragastric omentum (omentectomy):\par - Multiple noninvasive implants.\par - One (1) omental lymph node, negative for tumor.\par - Focal endosalpingiosis.\par \par GYN Oncology Tumor Board Consensus on 7/12/21: Clinical Trial, BRCA testing\par Diagnosis: Stage IIIB low grade serous ovarian carcinoma.\par

## 2021-11-24 NOTE — REVIEW OF SYSTEMS
[Negative] : Neurological [Recent Change In Weight] : ~T no recent weight change [FreeTextEntry2] : Myalgias

## 2021-11-24 NOTE — PHYSICAL EXAM
[Fully active, able to carry on all pre-disease performance without restriction] : Status 0 - Fully active, able to carry on all pre-disease performance without restriction [Normal] : affect appropriate [de-identified] : vertical abdominal incision site c/d/i, healing well. Nontender, no HSM

## 2021-11-24 NOTE — REASON FOR VISIT
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

## 2021-11-24 NOTE — HISTORY OF PRESENT ILLNESS
[de-identified] : Referred by Dr. Magalys Fraire\par \par Ms. 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  \par \par Pt. states she developed abdominal pain about a year ago. She presented to GYN in [place] and was advised she had a cyst on each ovary. She was prescribed birth control and advised to follow up in 3 months. She followed up in 3 months and was advised that cysts had become larger but there was nothing to do at that time and that should she experience severe pain to follow up with ER because this could signify cyst rupture. Patient decided to stop birth control pills because she was experiencing migraines, which stopped after stopping meds. \par \par Patient states she did go to the  ER in [place] because of severe pain and workup was normal and was discharged. She then traveled to [facility] in February and presented to ED there for severe pain. She was advised she had endometriosis and advised to follow up outpatient. Her insurance did not cover her primary GYN and was then referred to Dr. Christensen. \par \par She was subsequently referred to Dr. Fraire for gyn onc evaluation and underwent RA-laparoscopic LSO and R ovarian cystectomy on 6/15/21, followed by exp lap, LINCOLN-RSO, omentectomy, and optimal tumor debulking surgery (7/6/21). \par \par Final pathology:\par Final Diagnosis\par 1. Uterus, cervix and right ovary and fallopian tube (total hysterectomy and salpingo-oophorectomy):\par - Focal low-grade micropapillary serous carcinoma (best seen on slide 1O) arising in a borderline tumor of the ovary.\par - The overall size of the ovarian tumor is 2.5 x 2.0 x 1.2 cm.\par - Involvement of the ovarian surface by borderline tumor is identified.\par - Small invasive implants involving the ovary (slides 1M-O), uterine serosa (slide 1G), and the fallopian tube.\par - Chronic cervicitis and squamous metaplasia.\par - Secretory endometrium.\par - Uterine serosal adhesion and tubo-ovarian adhesion.\par - Post-surgery changes of the ovary.\par - Benign paratubal cysts.\par \par 2. 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 (back pain)\par \par She states she overall is feeling well. The week of chemo she had some dizziness this past cycle which was new. She has some mild headaches, diarrhea, back/joint pains, paresthesias that resolves after a week. She does still have gum/teeth sensitivity but otherwise feels well today. Good appetite, no weight loss. Currently denies headache, fevers, chills, chest pain, sob, abd pain, n/v/d/c, rash. Port site clean, no redness or discharge, no dysuria, urgency, no vaginal bleeding/discharge. Pepcid helps with her reflux. \par She has an appointment with Dr. Rizo, allergist to assist with carboplatin desensitization for C6 of carbo/taxol. \par

## 2021-11-24 NOTE — HISTORY OF PRESENT ILLNESS
[Disease: _____________________] : Disease: [unfilled] [AJCC Stage: ____] : AJCC Stage: [unfilled] [de-identified] : Referred by Dr. Magalys Fraire\par \par Ms. 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  \par \par Pt. states she developed abdominal pain about a year ago. She presented to GYN in [place] and was advised she had a cyst on each ovary. She was prescribed birth control and advised to follow up in 3 months. She followed up in 3 months and was advised that cysts had become larger but there was nothing to do at that time and that should she experience severe pain to follow up with ER because this could signify cyst rupture. Patient decided to stop birth control pills because she was experiencing migraines, which stopped after stopping meds. \par \par Patient states she did go to the  ER in [place] because of severe pain and workup was normal and was discharged. She then traveled to [facility] in February and presented to ED there for severe pain. She was advised she had endometriosis and advised to follow up outpatient. Her insurance did not cover her primary GYN and was then referred to Dr. Christensen. \par \par She was subsequently referred to Dr. Fraire for gyn onc evaluation and underwent RA-laparoscopic LSO and R ovarian cystectomy on 6/15/21, followed by exp lap, LINCOLN-RSO, omentectomy, and optimal tumor debulking surgery (7/6/21). \par \par Final pathology:\par Final Diagnosis\par 1. Uterus, cervix and right ovary and fallopian tube (total hysterectomy and salpingo-oophorectomy):\par - Focal low-grade micropapillary serous carcinoma (best seen on slide 1O) arising in a borderline tumor of the ovary.\par - The overall size of the ovarian tumor is 2.5 x 2.0 x 1.2 cm.\par - Involvement of the ovarian surface by borderline tumor is identified.\par - Small invasive implants involving the ovary (slides 1M-O), uterine serosa (slide 1G), and the fallopian tube.\par - Chronic cervicitis and squamous metaplasia.\par - Secretory endometrium.\par - Uterine serosal adhesion and tubo-ovarian adhesion.\par - Post-surgery changes of the ovary.\par - Benign paratubal cysts.\par \par 2. Infragastric omentum (omentectomy):\par - Multiple noninvasive implants.\par - One (1) omental lymph node, negative for tumor.\par - Focal endosalpingiosis.\par \par GYN Oncology Tumor Board Consensus on 7/12/21: Clinical Trial, BRCA testing\par Diagnosis: Stage IIIB low grade serous ovarian carcinoma.\par \par 8/11/2021: Started adjuvant carbo/taxol  [FreeTextEntry1] : surgery (7/6/21)  --> Carbo/Taxol started 8/11/2021 s/p cycle 3 [de-identified] : Neli comes in for follow up after C3 of carbo/taxol. \par \par During C3, patient felt extremely fatigued and promptly fell asleep after her dose of Benadryl. States that she was so 'zonked out' after benadryl that she urinated while asleep in the treatment room. She continues to have increased fatigue after this particular cycle. She is also complaining of heartburn and an intermittent dry cough. Her constipation and nausea are now getting better. She continues to have numbness in her hands/feet but states that it is mild and tolerable, and remains stable. She denies fevers, chills, n/v, abdominal pain, neuropathy, vaginal discharge or bleeding, urinary symptoms, cough, CP, SOB.\par

## 2021-11-24 NOTE — ASSESSMENT
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 \par \par #Stage IIIC Low Grade Ovarian Serous Carcinoma     \par -s/p C5 carbo/taxol with reaction to carboplatin (back pain)- completed C5 infusion \par -Has appointment with allergist, Dr. Rizo tomorrow to assist with desensitization. Will require hospitalization \par -interim bloodwork today; blood work from last visit reviewed \par -discussed ways to manage side effects and toxicities associated with chemotherapy in detail, including fatigue, decreased appetite, nausea, myalgias and bowel regimen with supportive care and medications.\par -Continue f/u with psycho-oncology and palliative care\par -Reviewed results of genetic testing as per patient request and will refer to genetics to discuss results of genetic testing- referred last visit, will reach out to genetics to schedule appointment \par -Continue Pepcid BID for reflux/gastritis symptoms \par -RTC after next cycle, plan for hydration after next cycle of chemotherapy as patient felt dizzy and dehydrated after C5 \par     \par All of the patient's questions and concerns were addressed in full. She knows to call the office should any other issues arise prior to next visit. Patient seen and discussed with Dr. Lopez.\par \par Solomon Leyva MD \par Hematology/Oncology Fellow, PGY-5

## 2021-11-24 NOTE — REVIEW OF SYSTEMS
[Negative] : Allergic/Immunologic [Fatigue] : fatigue [Cough] : cough [FreeTextEntry2] : Myalgias [FreeTextEntry7] : Heartburn [de-identified] : Numbness in hands and feet- grade 1

## 2021-11-24 NOTE — PHYSICAL EXAM
[Fully active, able to carry on all pre-disease performance without restriction] : Status 0 - Fully active, able to carry on all pre-disease performance without restriction [Normal] : affect appropriate [de-identified] : vertical abdominal incision site c/d/i, healing well. Nontender, no HSM

## 2021-11-30 NOTE — PHYSICAL EXAM
[Fully active, able to carry on all pre-disease performance without restriction] : Status 0 - Fully active, able to carry on all pre-disease performance without restriction [Normal] : affect appropriate [de-identified] : vertical abdominal incision site c/d/i, healing well. Nontender, no HSM

## 2021-11-30 NOTE — HISTORY OF PRESENT ILLNESS
[Disease: _____________________] : Disease: [unfilled] [AJCC Stage: ____] : AJCC Stage: [unfilled] [de-identified] : Referred by Dr. Magalys Fraire\par \par Ms. 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 She reports that last week, she developed URI symptoms, including nasal congestion, dry cough and itchy throat. She denies fevers or chills. She has not had any known COVID contacts. She has been taking OTC symptom relief medications including decongestant and antitussives with improvement of symptoms. She is feeling better this week.  She denies fevers, chills, n/v, abdominal pain, neuropathy, vaginal discharge or bleeding, urinary symptoms, CP, SOB.\par

## 2021-12-01 ENCOUNTER — APPOINTMENT (OUTPATIENT)
Dept: GYNECOLOGIC ONCOLOGY | Facility: CLINIC | Age: 31
End: 2021-12-01
Payer: MEDICAID

## 2021-12-01 VITALS
BODY MASS INDEX: 37.57 KG/M2 | WEIGHT: 199 LBS | HEART RATE: 82 BPM | DIASTOLIC BLOOD PRESSURE: 89 MMHG | HEIGHT: 61 IN | SYSTOLIC BLOOD PRESSURE: 125 MMHG

## 2021-12-01 PROCEDURE — 99214 OFFICE O/P EST MOD 30 MIN: CPT

## 2021-12-02 DIAGNOSIS — Z01.818 ENCOUNTER FOR OTHER PREPROCEDURAL EXAMINATION: ICD-10-CM

## 2021-12-03 ENCOUNTER — APPOINTMENT (OUTPATIENT)
Dept: DISASTER EMERGENCY | Facility: CLINIC | Age: 31
End: 2021-12-03

## 2021-12-06 ENCOUNTER — APPOINTMENT (OUTPATIENT)
Dept: INFUSION THERAPY | Facility: HOSPITAL | Age: 31
End: 2021-12-06

## 2021-12-06 ENCOUNTER — INPATIENT (INPATIENT)
Facility: HOSPITAL | Age: 31
LOS: 1 days | Discharge: HOME | End: 2021-12-08
Attending: HOSPITALIST | Admitting: HOSPITALIST
Payer: MEDICAID

## 2021-12-06 VITALS
DIASTOLIC BLOOD PRESSURE: 70 MMHG | HEART RATE: 92 BPM | OXYGEN SATURATION: 100 % | TEMPERATURE: 98 F | HEIGHT: 61 IN | SYSTOLIC BLOOD PRESSURE: 110 MMHG | RESPIRATION RATE: 18 BRPM | WEIGHT: 198.42 LBS

## 2021-12-06 DIAGNOSIS — C56.3 MALIGNANT NEOPLASM OF BILATERAL OVARIES: ICD-10-CM

## 2021-12-06 DIAGNOSIS — Z98.890 OTHER SPECIFIED POSTPROCEDURAL STATES: Chronic | ICD-10-CM

## 2021-12-06 DIAGNOSIS — C56.9 MALIGNANT NEOPLASM OF UNSPECIFIED OVARY: ICD-10-CM

## 2021-12-06 DIAGNOSIS — J45.909 UNSPECIFIED ASTHMA, UNCOMPLICATED: ICD-10-CM

## 2021-12-06 DIAGNOSIS — Z29.9 ENCOUNTER FOR PROPHYLACTIC MEASURES, UNSPECIFIED: ICD-10-CM

## 2021-12-06 LAB
ALBUMIN SERPL ELPH-MCNC: 4.2 G/DL — SIGNIFICANT CHANGE UP (ref 3.3–5)
ALP SERPL-CCNC: 64 U/L — SIGNIFICANT CHANGE UP (ref 40–120)
ALT FLD-CCNC: 31 U/L — SIGNIFICANT CHANGE UP (ref 4–33)
ANION GAP SERPL CALC-SCNC: 13 MMOL/L — SIGNIFICANT CHANGE UP (ref 7–14)
AST SERPL-CCNC: 23 U/L — SIGNIFICANT CHANGE UP (ref 4–32)
BILIRUB SERPL-MCNC: 0.4 MG/DL — SIGNIFICANT CHANGE UP (ref 0.2–1.2)
BUN SERPL-MCNC: 14 MG/DL — SIGNIFICANT CHANGE UP (ref 7–23)
CALCIUM SERPL-MCNC: 9.6 MG/DL — SIGNIFICANT CHANGE UP (ref 8.4–10.5)
CHLORIDE SERPL-SCNC: 102 MMOL/L — SIGNIFICANT CHANGE UP (ref 98–107)
CO2 SERPL-SCNC: 24 MMOL/L — SIGNIFICANT CHANGE UP (ref 22–31)
CREAT SERPL-MCNC: 0.62 MG/DL — SIGNIFICANT CHANGE UP (ref 0.5–1.3)
GLUCOSE SERPL-MCNC: 129 MG/DL — HIGH (ref 70–99)
HCT VFR BLD CALC: 30.2 % — LOW (ref 34.5–45)
HGB BLD-MCNC: 10.1 G/DL — LOW (ref 11.5–15.5)
MCHC RBC-ENTMCNC: 29.9 PG — SIGNIFICANT CHANGE UP (ref 27–34)
MCHC RBC-ENTMCNC: 33.4 GM/DL — SIGNIFICANT CHANGE UP (ref 32–36)
MCV RBC AUTO: 89.3 FL — SIGNIFICANT CHANGE UP (ref 80–100)
NRBC # BLD: 0 /100 WBCS — SIGNIFICANT CHANGE UP
NRBC # FLD: 0 K/UL — SIGNIFICANT CHANGE UP
PLATELET # BLD AUTO: 190 K/UL — SIGNIFICANT CHANGE UP (ref 150–400)
POTASSIUM SERPL-MCNC: 3.6 MMOL/L — SIGNIFICANT CHANGE UP (ref 3.5–5.3)
POTASSIUM SERPL-SCNC: 3.6 MMOL/L — SIGNIFICANT CHANGE UP (ref 3.5–5.3)
PROT SERPL-MCNC: 7.5 G/DL — SIGNIFICANT CHANGE UP (ref 6–8.3)
RBC # BLD: 3.38 M/UL — LOW (ref 3.8–5.2)
RBC # FLD: 14.9 % — HIGH (ref 10.3–14.5)
SODIUM SERPL-SCNC: 139 MMOL/L — SIGNIFICANT CHANGE UP (ref 135–145)
WBC # BLD: 6.28 K/UL — SIGNIFICANT CHANGE UP (ref 3.8–10.5)
WBC # FLD AUTO: 6.28 K/UL — SIGNIFICANT CHANGE UP (ref 3.8–10.5)

## 2021-12-06 PROCEDURE — 99223 1ST HOSP IP/OBS HIGH 75: CPT | Mod: GC

## 2021-12-06 PROCEDURE — 99223 1ST HOSP IP/OBS HIGH 75: CPT

## 2021-12-06 RX ORDER — ACETAMINOPHEN 500 MG
2 TABLET ORAL
Qty: 0 | Refills: 0 | DISCHARGE

## 2021-12-06 RX ORDER — FLUTICASONE PROPIONATE 220 MCG
0 AEROSOL WITH ADAPTER (GRAM) INHALATION
Qty: 0 | Refills: 0 | DISCHARGE

## 2021-12-06 RX ORDER — ACETAMINOPHEN 500 MG
650 TABLET ORAL EVERY 6 HOURS
Refills: 0 | Status: DISCONTINUED | OUTPATIENT
Start: 2021-12-06 | End: 2021-12-08

## 2021-12-06 RX ORDER — LANOLIN ALCOHOL/MO/W.PET/CERES
3 CREAM (GRAM) TOPICAL AT BEDTIME
Refills: 0 | Status: DISCONTINUED | OUTPATIENT
Start: 2021-12-06 | End: 2021-12-08

## 2021-12-06 RX ORDER — SODIUM CHLORIDE 0.65 %
1 AEROSOL, SPRAY (ML) NASAL
Refills: 0 | Status: DISCONTINUED | OUTPATIENT
Start: 2021-12-06 | End: 2021-12-08

## 2021-12-06 RX ORDER — IBUPROFEN 200 MG
600 TABLET ORAL EVERY 6 HOURS
Refills: 0 | Status: DISCONTINUED | OUTPATIENT
Start: 2021-12-06 | End: 2021-12-08

## 2021-12-06 RX ORDER — ONDANSETRON 8 MG/1
4 TABLET, FILM COATED ORAL EVERY 8 HOURS
Refills: 0 | Status: DISCONTINUED | OUTPATIENT
Start: 2021-12-06 | End: 2021-12-08

## 2021-12-06 RX ORDER — INFLUENZA VIRUS VACCINE 15; 15; 15; 15 UG/.5ML; UG/.5ML; UG/.5ML; UG/.5ML
0.5 SUSPENSION INTRAMUSCULAR ONCE
Refills: 0 | Status: DISCONTINUED | OUTPATIENT
Start: 2021-12-06 | End: 2021-12-08

## 2021-12-06 RX ORDER — ENOXAPARIN SODIUM 100 MG/ML
40 INJECTION SUBCUTANEOUS DAILY
Refills: 0 | Status: DISCONTINUED | OUTPATIENT
Start: 2021-12-06 | End: 2021-12-08

## 2021-12-06 RX ORDER — ACETAMINOPHEN 500 MG
1000 TABLET ORAL ONCE
Refills: 0 | Status: COMPLETED | OUTPATIENT
Start: 2021-12-06 | End: 2021-12-06

## 2021-12-06 RX ADMIN — ENOXAPARIN SODIUM 40 MILLIGRAM(S): 100 INJECTION SUBCUTANEOUS at 17:32

## 2021-12-06 RX ADMIN — Medication 3 MILLIGRAM(S): at 21:20

## 2021-12-06 RX ADMIN — Medication 600 MILLIGRAM(S): at 21:19

## 2021-12-06 RX ADMIN — Medication 1000 MILLIGRAM(S): at 14:30

## 2021-12-06 RX ADMIN — Medication 400 MILLIGRAM(S): at 13:33

## 2021-12-06 RX ADMIN — Medication 1 SPRAY(S): at 19:06

## 2021-12-06 RX ADMIN — Medication 600 MILLIGRAM(S): at 21:50

## 2021-12-06 NOTE — CONSULT NOTE ADULT - SUBJECTIVE AND OBJECTIVE BOX
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     The patient was initially diagnosed approximately 1 year ago after developing abdominal pain. She is s/p exp lap, LINCOLN-RSO, omentectomy, and optimal tumor debulking surgery (7/6/21) and previous to that RA-laparoscopic LSO and R ovarian cystectomy (6/15/21) with pathology showing focal low-grade micropapillary serous carcinoma. She was started on adjuvant Carbo/Taxol on 8/11/2021. During C5, she was noted to have a reaction to Carboplatin (back pain). She is now being admitted for C6 Carboplatin (desensitzation protocol).     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 Jaylen Lopez at [facility]  -s/p RA-laparoscopic LSO and R ovarian cystectomy (6/15/21)  -s/p exp lap, LINCOLN-RSO, omentectomy, and optimal tumor debulking surgery (7/6/21)   -8/11/2021: Started adjuvant carbo/taxol.       PAST MEDICAL & SURGICAL HISTORY:  Migraine    Vertigo    Asthma  mild intermittent    Kidney stone    Ovarian carcinoma    History of surgery  Pt is s/p Robotic assisted laparoscopic left salpingo-oophorectomy, right ovarian cystectomy, pelvic washings and tumor debulking on 6/15/21. On 7/6/21 she underwent Ex Lap, LINCOLN-RSO, omentectomy and CUSA ablation and optimal tumor debulking        Allergies    No Known Drug Allergies  Nuts (Headache)  onions, kiwi- headache (Other)    Intolerances        MEDICATIONS  (STANDING):    MEDICATIONS  (PRN):      FAMILY HISTORY:  FH: HTN (hypertension) (Father, Mother)    FH: sickle cell anemia (Sibling)        SOCIAL HISTORY: No EtOH, no tobacco    REVIEW OF SYSTEMS:  12 point  review of systems is negative unless indicated above.    Height (cm): 154.9 (12-06 @ 11:07)  Weight (kg): 90 (12-06 @ 11:07)  BMI (kg/m2): 37.5 (12-06 @ 11:07)  BSA (m2): 1.88 (12-06 @ 11:07)    T(F): 97.8 (12-06-21 @ 11:07), Max: 97.8 (12-06-21 @ 11:07)  HR: 92 (12-06-21 @ 11:07)  BP: 110/70 (12-06-21 @ 11:07)  RR: 18 (12-06-21 @ 11:07)  SpO2: 100% (12-06-21 @ 11:07)  Wt(kg): --    GENERAL: NAD, well-developed  HEAD:  Atraumatic, Normocephalic  EYES: EOMI, PERRLA, conjunctiva and sclera clear  NECK: Supple, No JVD  CHEST/LUNG: Clear to auscultation bilaterally; No wheeze  HEART: Regular rate and rhythm; No murmurs, rubs, or gallops  ABDOMEN: Soft, Nontender, Nondistended; Bowel sounds present  EXTREMITIES:  2+ Peripheral Pulses, No clubbing, cyanosis, or edema  NEUROLOGY: non-focal  SKIN: No rashes or lesions                         HPI: Patient is a 32 y/o pre-menopausal F with 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     The patient was initially diagnosed approximately 1 year ago after developing abdominal pain. She is s/p exp lap, LINCOLN-RSO, omentectomy, and optimal tumor debulking surgery (7/6/21) and previous to that RA-laparoscopic LSO and R ovarian cystectomy (6/15/21) with pathology showing focal low-grade micropapillary serous carcinoma. She was started on adjuvant Carbo/Taxol on 8/11/2021. During C5, she was noted to have a reaction to Carboplatin (back pain). She is now being admitted for C6 Carboplatin (desensitzation protocol).     When seen today, patient notes that she feels well. She is still experiencing some back pain and leg pain that has been persistent since her C5 reaction. She denies any CP, SOB, abdominal pain, N/V.     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 Jaylen Lopez at [facility]  -s/p RA-laparoscopic LSO and R ovarian cystectomy (6/15/21)  -s/p exp lap, LINCOLN-RSO, omentectomy, and optimal tumor debulking surgery (7/6/21)   -8/11/2021: Started adjuvant carbo/taxol.       PAST MEDICAL & SURGICAL HISTORY:  Migraine    Vertigo    Asthma  mild intermittent    Kidney stone    Ovarian carcinoma    History of surgery  Pt is s/p Robotic assisted laparoscopic left salpingo-oophorectomy, right ovarian cystectomy, pelvic washings and tumor debulking on 6/15/21. On 7/6/21 she underwent Ex Lap, LINCOLN-RSO, omentectomy and CUSA ablation and optimal tumor debulking        Allergies    No Known Drug Allergies  Nuts (Headache)  onions, kiwi- headache (Other)    Intolerances        MEDICATIONS  (STANDING):    MEDICATIONS  (PRN):      FAMILY HISTORY:  FH: HTN (hypertension) (Father, Mother)    FH: sickle cell anemia (Sibling)        SOCIAL HISTORY: No EtOH, no tobacco    REVIEW OF SYSTEMS:  12 point  review of systems is negative unless indicated above.    Height (cm): 154.9 (12-06 @ 11:07)  Weight (kg): 90 (12-06 @ 11:07)  BMI (kg/m2): 37.5 (12-06 @ 11:07)  BSA (m2): 1.88 (12-06 @ 11:07)    T(F): 97.8 (12-06-21 @ 11:07), Max: 97.8 (12-06-21 @ 11:07)  HR: 92 (12-06-21 @ 11:07)  BP: 110/70 (12-06-21 @ 11:07)  RR: 18 (12-06-21 @ 11:07)  SpO2: 100% (12-06-21 @ 11:07)  Wt(kg): --    GENERAL: NAD, well-developed  HEAD:  Atraumatic, Normocephalic  EYES: EOMI, PERRLA, conjunctiva and sclera clear  NECK: Supple, No JVD  CHEST/LUNG: Clear to auscultation bilaterally; No wheeze  HEART: Regular rate and rhythm; No murmurs, rubs, or gallops  ABDOMEN: Soft, Nontender, Nondistended; Bowel sounds present  EXTREMITIES:  2+ Peripheral Pulses, No clubbing, cyanosis, or edema  NEUROLOGY: non-focal  SKIN: No rashes or lesions                              10.1   6.28  )-----------( 190      ( 06 Dec 2021 13:37 )             30.2       12-06    139  |  102  |  14  ----------------------------<  129<H>  3.6   |  24  |  0.62    Ca    9.6      06 Dec 2021 13:37    TPro  7.5  /  Alb  4.2  /  TBili  0.4  /  DBili  x   /  AST  23  /  ALT  31  /  AlkPhos  64  12-06      LIVER FUNCTIONS - ( 06 Dec 2021 13:37 )  Alb: 4.2 g/dL / Pro: 7.5 g/dL / ALK PHOS: 64 U/L / ALT: 31 U/L / AST: 23 U/L / GGT: x

## 2021-12-06 NOTE — H&P ADULT - NSICDXPASTSURGICALHX_GEN_ALL_CORE_FT
PAST SURGICAL HISTORY:  History of surgery Pt is s/p Robotic assisted laparoscopic left salpingo-oophorectomy, right ovarian cystectomy, pelvic washings and tumor debulking on 6/15/21. On 7/6/21 she underwent Ex Lap, LINCOLN-RSO, omentectomy and CUSA ablation and optimal tumor debulking

## 2021-12-06 NOTE — H&P ADULT - ASSESSMENT
30 y/o F with Stage 3 Low Grade Serous Ovarian Carcinoma currently on Carbo/Taxol (C5 11/3/21) with C5 c/b transfusion reaction. Presenting now for Carboplatin C6 desensitization protocol.   30 y/o F with Stage 3 Low Grade Serous Ovarian Carcinoma currently on Carbo/Taxol (C5 11/3/21) with C5 c/b transfusion reaction admitted for   Carboplatin C6 desensitization protocol.

## 2021-12-06 NOTE — H&P ADULT - NSHPREVIEWOFSYSTEMS_GEN_ALL_CORE
REVIEW OF SYSTEMS:    CONSTITUTIONAL: No weakness, fevers or chills  EYES/ENT: No visual changes;  no throat pain   NECK: No pain or stiffness  RESPIRATORY: No cough, wheezing, hemoptysis; No shortness of breath  CARDIOVASCULAR: No chest pain or palpitations  GASTROINTESTINAL: No abdominal or epigastric pain. No nausea, vomiting, or hematemesis; No diarrhea or constipation. No melena or hematochezia.  GENITOURINARY: No dysuria, change in frequency or hematuria  NEUROLOGICAL: No numbness or weakness  SKIN: No itching, burning, rashes, or lesions   Psych: No depression   All other review of systems is negative unless indicated above.

## 2021-12-06 NOTE — H&P ADULT - PROBLEM SELECTOR PLAN 1
Onc following, Onc following,  - Pt will require transfer to ICU for de-sensitization protocol Onc following,  - Pt will require transfer to ICU for de-sensitization protocol. Spoke to ICU - requesting Allergy and immunology consult  - Allergy and immunology consult - paged [phone]  - Leg pain: Tylenol IV and ibuprofen PRN

## 2021-12-06 NOTE — CONSULT NOTE ADULT - ASSESSMENT
32 y/o F with Stage 3 Low Grade Serous Ovarian Carcinoma currently on Carbo/Taxol (C5 11/3/21) with C5 c/b transfusion reaction. Presenting now for Carboplatin C6 desensitization protocol.      #History of Stage III low grade Serous Ovarian Carcinoma  -Patient follows with Dr. Jaylen Lopez at [facility] [place]  -Diagnosed jeff 1 year ago after presenting with abdominal pain.  -s/p RA-laparoscopic LSO and R ovarian cystectomy (6/15/21)  -s/p exp lap, LINCOLN-RSO, omentectomy, and optimal tumor debulking surgery (7/6/21)   -8/11/2021: Started adjuvant carbo/taxol  -C5 Carbo/Taxol on 11/3/21 complicated by carboplatin reaction (back pain).   -Admitted now for C6 Carboplatin desensitization  ---Please consult AI to assist with desensitization protocol  ---Per AI outpatient note, patient will require transfer to MICU to receive chemotherapy      *Per AI protocol, If reactions occur:			  1. Temporarily halt the infusion and treat as appropriate:			  2. In case of mild allergic reaction (itching ,flushing,  hives, mild swelling, mild nausea/ discomfort), and back pain- immediately give 25-50 mg Benadryl IM/IV and additional cetirizine 10 mg  3. For wheezing- use 1 unit of  Albuterol  0.083% 3cc via nebulizer			  4. For severe systemic reaction and anaphylaxis, including laryngeal edema (voice changes, throat tightness, stridor, difficulty swallowing), respiratory (shortness of breath, wheezing, repetitive cough),	GI (repetitive vomiting, severe diarrhea), or cardiovascular symptoms (hypotension, hypotonia/ collapse)-give  0.3 cc 1:1000 Epinephrine IM immediately.		  5. The protocol should be aborted if the patient develops hypotension and/or laryngeal edema that are not immediately responsive to IM epinephrine			  6.  Refractory cases and/or those on beta blockers may require glucagon (1-2mg IV over 5 minutes); followed by 5-15mcg/min infusion if needed.			  7.  Resume the protocol by restarting at the same step the protocol had been paused.    Luz Maria James MD  Hematology/Oncology Fellow, PGY-5  Pager: [phone]  After 5pm and on weekends please page on-call fellow			       30 y/o F with Stage 3 Low Grade Serous Ovarian Carcinoma currently on Carbo/Taxol (C5 11/3/21) with C5 c/b transfusion reaction. Presenting now for Carboplatin C6 desensitization protocol.      #History of Stage III low grade Serous Ovarian Carcinoma  -Patient follows with Dr. Jaylen Lopez at [facility] [place]  -Diagnosed mookley 1 year ago after presenting with abdominal pain.  -s/p RA-laparoscopic LSO and R ovarian cystectomy (6/15/21)  -s/p exp lap, LINCOLN-RSO, omentectomy, and optimal tumor debulking surgery (7/6/21)   -8/11/2021: Started adjuvant carbo/taxol  -C5 Carbo/Taxol on 11/3/21 complicated by carboplatin reaction (back pain).   -Admitted now for C6 Carboplatin desensitization  ---Please consult AI to assist with desensitization protocol  ---Per AI outpatient note, patient will require transfer to MICU to receive chemotherapy--medicine team will need to coordinate with ICU regarding transfer. Chemotherapy will be ready to administer once patient is in the ICU      *Per AI protocol, If reactions occur:			  1. Temporarily halt the infusion and treat as appropriate:			  2. In case of mild allergic reaction (itching ,flushing,  hives, mild swelling, mild nausea/ discomfort), and back pain- immediately give 25-50 mg Benadryl IM/IV and additional cetirizine 10 mg  3. For wheezing- use 1 unit of  Albuterol  0.083% 3cc via nebulizer			  4. For severe systemic reaction and anaphylaxis, including laryngeal edema (voice changes, throat tightness, stridor, difficulty swallowing), respiratory (shortness of breath, wheezing, repetitive cough),	GI (repetitive vomiting, severe diarrhea), or cardiovascular symptoms (hypotension, hypotonia/ collapse)-give  0.3 cc 1:1000 Epinephrine IM immediately.		  5. The protocol should be aborted if the patient develops hypotension and/or laryngeal edema that are not immediately responsive to IM epinephrine			  6.  Refractory cases and/or those on beta blockers may require glucagon (1-2mg IV over 5 minutes); followed by 5-15mcg/min infusion if needed.			  7.  Resume the protocol by restarting at the same step the protocol had been paused.    Luz Maria James MD  Hematology/Oncology Fellow, PGY-5  Pager: [phone]  After 5pm and on weekends please page on-call fellow			       30 y/o F with Stage 3 Low Grade Serous Ovarian Carcinoma currently on Carbo/Taxol (C5 11/3/21) with C5 c/b transfusion reaction. Presenting now for Carboplatin C6 desensitization protocol.      #History of Stage III low grade Serous Ovarian Carcinoma  -Patient follows with Dr. Jaylen Lopez at [facility] [place]  -Diagnosed mookley 1 year ago after presenting with abdominal pain.  -s/p RA-laparoscopic LSO and R ovarian cystectomy (6/15/21)  -s/p exp lap, LINCOLN-RSO, omentectomy, and optimal tumor debulking surgery (7/6/21)   -8/11/2021: Started adjuvant carbo/taxol  -C5 Carbo/Taxol on 11/3/21 complicated by carboplatin reaction (back pain).   -Admitted now for C6 Carboplatin desensitization  ---Please consult AI to assist with desensitization protocol  ---Per AI outpatient note, patient will require transfer to MICU to receive chemotherapy--medicine team will need to coordinate with ICU regarding transfer. Chemotherapy will be ready to administer once patient is in the ICU  -Check CBC, CMP, PT/INR, aPTT, Mg, Phos now      *Per AI protocol, If reactions occur:			  1. Temporarily halt the infusion and treat as appropriate:			  2. In case of mild allergic reaction (itching ,flushing,  hives, mild swelling, mild nausea/ discomfort), and back pain- immediately give 25-50 mg Benadryl IM/IV and additional cetirizine 10 mg  3. For wheezing- use 1 unit of  Albuterol  0.083% 3cc via nebulizer			  4. For severe systemic reaction and anaphylaxis, including laryngeal edema (voice changes, throat tightness, stridor, difficulty swallowing), respiratory (shortness of breath, wheezing, repetitive cough),	GI (repetitive vomiting, severe diarrhea), or cardiovascular symptoms (hypotension, hypotonia/ collapse)-give  0.3 cc 1:1000 Epinephrine IM immediately.		  5. The protocol should be aborted if the patient develops hypotension and/or laryngeal edema that are not immediately responsive to IM epinephrine			  6.  Refractory cases and/or those on beta blockers may require glucagon (1-2mg IV over 5 minutes); followed by 5-15mcg/min infusion if needed.			  7.  Resume the protocol by restarting at the same step the protocol had been paused.    Luz Maria James MD  Hematology/Oncology Fellow, PGY-5  Pager: [phone]  After 5pm and on weekends please page on-call fellow			       32 y/o F with Stage 3 Low Grade Serous Ovarian Carcinoma currently on Carbo/Taxol (C5 11/3/21) with C5 c/b transfusion reaction. Presenting now for Carboplatin C6 desensitization protocol.      #History of Stage III low grade Serous Ovarian Carcinoma  -[place]len [place] [facility] [place]Diagnosed jeff 1 year ago after presenting with abdominal pain.  -s/p RA-laparoscopic LSO and R ovarian cystectomy (6/15/21)  -s/p exp lap, LINCOLN-RSO, omentectomy, and optimal tumor debulking surgery (7/6/21)   -8/11/2021: Started adjuvant carbo/taxol  -C5 Carbo/Taxol on 11/3/21 complicated by carboplatin reaction (back pain).   -Admitted now for C6 Carboplatin desensitization  ---Please consult AI to assist with desensitization protocol  ---Per AI outpatient note, patient will require transfer to MICU to receive chemotherapy--medicine team will need to coordinate with ICU regarding transfer. Chemotherapy will be ready to administer once patient is in the ICU-plan to give on 12/7/21 pending bed availability  -Check CBC, CMP, PT/INR, aPTT, Mg, Phos now      *Per AI protocol, If reactions occur:			  1. Temporarily halt the infusion and treat as appropriate:			  2. In case of mild allergic reaction (itching ,flushing,  hives, mild swelling, mild nausea/ discomfort), and back pain- immediately give 25-50 mg Benadryl IM/IV and additional cetirizine 10 mg  3. For wheezing- use 1 unit of  Albuterol  0.083% 3cc via nebulizer			  4. For severe systemic reaction and anaphylaxis, including laryngeal edema (voice changes, throat tightness, stridor, difficulty swallowing), respiratory (shortness of breath, wheezing, repetitive cough),	GI (repetitive vomiting, severe diarrhea), or cardiovascular symptoms (hypotension, hypotonia/ collapse)-give  0.3 cc 1:1000 Epinephrine IM immediately.		  5. The protocol should be aborted if the patient develops hypotension and/or laryngeal edema that are not immediately responsive to IM epinephrine			  6.  Refractory cases and/or those on beta blockers may require glucagon (1-2mg IV over 5 minutes); followed by 5-15mcg/min infusion if needed.			  7.  Resume the protocol by restarting at the same step the protocol had been paused.    Luz Maria James MD  Hematology/Oncology Fellow, PGY-5  Pager: [phone]  After 5pm and on weekends please page on-call fellow

## 2021-12-06 NOTE — H&P ADULT - HISTORY OF PRESENT ILLNESS
30 y/o F with Stage 3 Low Grade Serous Ovarian Carcinoma currently on Carbo/Taxol (C5 11/3/21) with C5 c/b transfusion reaction. Presenting now for Carboplatin C6 desensitization protocol.   30 y/o F with Stage 3 Low Grade Serous Ovarian Carcinoma currently on Carbo/Taxol (C5 11/3/21) with C5 c/b transfusion reaction. Presenting now for Carboplatin C6 desensitization protocol.  Patient examined at bedside. Reports leg pain after cycle 5 of chemo. She discussed with oncologist, who recommended ibuprofen 600mg. She reports some relief. Otherwise, no medical concerns. No fevers, chills, chest pain, trouble breathing

## 2021-12-06 NOTE — H&P ADULT - NSHPLABSRESULTS_GEN_ALL_CORE
LABS: Pending   CAPILLARY BLOOD GLUCOSE                              RADIOLOGY & ADDITIONAL TESTS:    Telemetry Personally Reviewed:    ECG Personally Reviewed:    Imaging Personally Reviewed:    Imaging Reviewed:     Consultant(s) Notes Reviewed:      Care Discussed with Consultants/Other Providers:

## 2021-12-06 NOTE — H&P ADULT - NSHPPHYSICALEXAM_GEN_ALL_CORE
GENERAL: Young woman in NAD  HEENT: EOMI, MMM, no oropharyngeal lesions or erythema appreciated  Pulm: normal work of breathing, CTABL  CV: RRR, S1&S2+, no m/r/g appreciated  ABDOMEN: soft, nt, nd, no hepatosplenomegaly  MSK: nl ROM  EXTREMITIES:  no appreciable edema in b/l LE  Neuro: A&Ox3, no focal deficits  SKIN: warm and dry, no visible rash

## 2021-12-06 NOTE — H&P ADULT - NSICDXPASTMEDICALHX_GEN_ALL_CORE_FT
PAST MEDICAL HISTORY:  Asthma mild intermittent    Kidney stone     Migraine     Ovarian carcinoma     Vertigo

## 2021-12-06 NOTE — CHART NOTE - NSCHARTNOTEFT_GEN_A_CORE
[facility] - Division of Allergy & Immunology											Date: 21				  Carboplatin Desensitization Protocol				GAVIN JEAN,  1990											  															  Total Dose (mg):	700														  															  	Concentration (mg/ml)														   Bag A:	0.01														  Bag B:	0.1														  Bag C:	1														  															  															  Step #	Infusion Duration (min)	Bag	Rate (mL/hr)	Volume to Infuse (mL)	Dose (mg)	Cum dose (mg)	IVF			    Exam					  								BP	HR	O2 sats	RR	Skin	Lungs		  															  1	15	A	5	1.25	0.0125	0.0125	100 cc/h								  2	15	A	10	2.5	0.025	0.0375	100 cc/h							2	  3	15	A	20	5	0.05	0.0875	100 cc/h							2	  4	15	A	40	10	0.1	0.1875	100 cc/h							2	  															  5	15	B	10	2.5	0.25	0.4375	100 cc/h							2.5	  6	15	B	20	5	0.5	0.9375	100 cc/h							2	  7	15	B	40	10	1	1.9375	100 cc/h							2	  8	15	B	80	20	2	3.9375	100 cc/h							2	  															  9	15	C	20	5	5	8.9375	100 cc/h							2.5	  10	15	C	40	10	10	18.9375	100 cc/h							2	  11	15	C	80	20	20	38.9375	100 cc/h							2	  12	330.12198	C	235	638.7363	661.0625	700	250 cc/h							[phone]	  															  Total minutes:	495.78066														  Total Hours:	[id number]														  															  *Premedication:  cetirizine 10 mg PO and Famotidine 20 mg PO 1 hour before initiation of desensitization in addition to usual premedications.															  	Use lorazepam 0.5-1 mg PRN for anxiety 20 mins prior to starting the infusion.														  *Beta-blockers must be held 24 hours before desensitization															  *If reactions occur:															  1.     temporarily halt the infusion and treat as appropriate:															  2.     In case of mild allergic reaction (itching ,flushing,  hives, mild swelling, mild nausea/ discomfort), and back pain- immediately give 25-50 mg Benadryl IM/IV and additional cetirizine 10 mg PO															  3.     For wheezing- use 1 unit of  Albuterol  0.083% 3cc via nebulizer															  4.     For severe systemic reaction and anaphylaxis, including laryngeal edema (voice changes, throat tightness, stridor, difficulty swallowing), respiratory (shortness of breath, wheezing, repetitive cough),															  	 GI (repetitive vomiting, severe diarrhea), or cardiovascular symptoms (hypotension, hypotonia/ collapse)-give  0.3 cc 1:1000 Epinephrine IM immediately.														  5.     The protocol should be aborted if the patient develops hypotension and/or laryngeal edema that are not immediately responsive to IM epinephrine															  6.     Refractory cases and/or those on beta blockers may require glucagon (1-2mg IV over 5 minutes); followed by 5-15mcg/min infusion if needed.															  7.     Resume the protocol by restarting at the same step the protocol had been paused.															  Please contact us with any questions: [phone]

## 2021-12-06 NOTE — CONSULT NOTE ADULT - ATTENDING COMMENTS
Patient with LG serous ovarian cancer, admitted for carboplatin desensitization due to allergic reaction to carboplatin with last treatment cycle. Pre-medications and desensitization protocol per allergy/immunology team and Dr. Rizo. Plan to transfer to MICU for close monitoring of reactions and hemodynamics. Patient feels well and clinically stable to proceed with chemotherapy.

## 2021-12-06 NOTE — PATIENT PROFILE ADULT - FALL HARM RISK - HARM RISK INTERVENTIONS

## 2021-12-07 ENCOUNTER — NON-APPOINTMENT (OUTPATIENT)
Age: 31
End: 2021-12-07

## 2021-12-07 LAB
ALBUMIN SERPL ELPH-MCNC: 4 G/DL — SIGNIFICANT CHANGE UP (ref 3.3–5)
ALP SERPL-CCNC: 68 U/L — SIGNIFICANT CHANGE UP (ref 40–120)
ALT FLD-CCNC: 25 U/L — SIGNIFICANT CHANGE UP (ref 4–33)
ANION GAP SERPL CALC-SCNC: 11 MMOL/L — SIGNIFICANT CHANGE UP (ref 7–14)
AST SERPL-CCNC: 20 U/L — SIGNIFICANT CHANGE UP (ref 4–32)
BASOPHILS # BLD AUTO: 0.02 K/UL — SIGNIFICANT CHANGE UP (ref 0–0.2)
BASOPHILS NFR BLD AUTO: 0.4 % — SIGNIFICANT CHANGE UP (ref 0–2)
BILIRUB DIRECT SERPL-MCNC: <0.2 MG/DL — SIGNIFICANT CHANGE UP (ref 0–0.3)
BILIRUB INDIRECT FLD-MCNC: >0 MG/DL — SIGNIFICANT CHANGE UP (ref 0–1)
BILIRUB SERPL-MCNC: 0.2 MG/DL — SIGNIFICANT CHANGE UP (ref 0.2–1.2)
BUN SERPL-MCNC: 19 MG/DL — SIGNIFICANT CHANGE UP (ref 7–23)
CALCIUM SERPL-MCNC: 9.5 MG/DL — SIGNIFICANT CHANGE UP (ref 8.4–10.5)
CHLORIDE SERPL-SCNC: 104 MMOL/L — SIGNIFICANT CHANGE UP (ref 98–107)
CO2 SERPL-SCNC: 26 MMOL/L — SIGNIFICANT CHANGE UP (ref 22–31)
CREAT SERPL-MCNC: 0.69 MG/DL — SIGNIFICANT CHANGE UP (ref 0.5–1.3)
EOSINOPHIL # BLD AUTO: 0.04 K/UL — SIGNIFICANT CHANGE UP (ref 0–0.5)
EOSINOPHIL NFR BLD AUTO: 0.8 % — SIGNIFICANT CHANGE UP (ref 0–6)
GLUCOSE SERPL-MCNC: 98 MG/DL — SIGNIFICANT CHANGE UP (ref 70–99)
HCT VFR BLD CALC: 32 % — LOW (ref 34.5–45)
HGB BLD-MCNC: 10.8 G/DL — LOW (ref 11.5–15.5)
IANC: 1.78 K/UL — SIGNIFICANT CHANGE UP (ref 1.5–8.5)
IMM GRANULOCYTES NFR BLD AUTO: 0.2 % — SIGNIFICANT CHANGE UP (ref 0–1.5)
LYMPHOCYTES # BLD AUTO: 3.03 K/UL — SIGNIFICANT CHANGE UP (ref 1–3.3)
LYMPHOCYTES # BLD AUTO: 57.3 % — HIGH (ref 13–44)
MCHC RBC-ENTMCNC: 30 PG — SIGNIFICANT CHANGE UP (ref 27–34)
MCHC RBC-ENTMCNC: 33.8 GM/DL — SIGNIFICANT CHANGE UP (ref 32–36)
MCV RBC AUTO: 88.9 FL — SIGNIFICANT CHANGE UP (ref 80–100)
MONOCYTES # BLD AUTO: 0.41 K/UL — SIGNIFICANT CHANGE UP (ref 0–0.9)
MONOCYTES NFR BLD AUTO: 7.8 % — SIGNIFICANT CHANGE UP (ref 2–14)
NEUTROPHILS # BLD AUTO: 1.78 K/UL — LOW (ref 1.8–7.4)
NEUTROPHILS NFR BLD AUTO: 33.5 % — LOW (ref 43–77)
NRBC # BLD: 0 /100 WBCS — SIGNIFICANT CHANGE UP
NRBC # FLD: 0 K/UL — SIGNIFICANT CHANGE UP
PLATELET # BLD AUTO: 193 K/UL — SIGNIFICANT CHANGE UP (ref 150–400)
POTASSIUM SERPL-MCNC: 4.1 MMOL/L — SIGNIFICANT CHANGE UP (ref 3.5–5.3)
POTASSIUM SERPL-SCNC: 4.1 MMOL/L — SIGNIFICANT CHANGE UP (ref 3.5–5.3)
PROT SERPL-MCNC: 7.1 G/DL — SIGNIFICANT CHANGE UP (ref 6–8.3)
RBC # BLD: 3.6 M/UL — LOW (ref 3.8–5.2)
RBC # FLD: 14.6 % — HIGH (ref 10.3–14.5)
SODIUM SERPL-SCNC: 141 MMOL/L — SIGNIFICANT CHANGE UP (ref 135–145)
WBC # BLD: 5.29 K/UL — SIGNIFICANT CHANGE UP (ref 3.8–10.5)
WBC # FLD AUTO: 5.29 K/UL — SIGNIFICANT CHANGE UP (ref 3.8–10.5)

## 2021-12-07 PROCEDURE — 99232 SBSQ HOSP IP/OBS MODERATE 35: CPT | Mod: GC

## 2021-12-07 PROCEDURE — 99291 CRITICAL CARE FIRST HOUR: CPT

## 2021-12-07 RX ORDER — CARBOPLATIN 50 MG
0.5 VIAL (EA) INTRAVENOUS ONCE
Refills: 0 | Status: COMPLETED | OUTPATIENT
Start: 2021-12-07 | End: 2021-12-07

## 2021-12-07 RX ORDER — CHLORHEXIDINE GLUCONATE 213 G/1000ML
1 SOLUTION TOPICAL DAILY
Refills: 0 | Status: DISCONTINUED | OUTPATIENT
Start: 2021-12-07 | End: 2021-12-08

## 2021-12-07 RX ORDER — CETIRIZINE HYDROCHLORIDE 10 MG/1
10 TABLET ORAL ONCE
Refills: 0 | Status: COMPLETED | OUTPATIENT
Start: 2021-12-07 | End: 2021-12-07

## 2021-12-07 RX ORDER — FOSAPREPITANT DIMEGLUMINE 150 MG/5ML
150 INJECTION, POWDER, LYOPHILIZED, FOR SOLUTION INTRAVENOUS ONCE
Refills: 0 | Status: COMPLETED | OUTPATIENT
Start: 2021-12-07 | End: 2021-12-08

## 2021-12-07 RX ORDER — SODIUM CHLORIDE 9 MG/ML
1000 INJECTION INTRAMUSCULAR; INTRAVENOUS; SUBCUTANEOUS
Refills: 0 | Status: DISCONTINUED | OUTPATIENT
Start: 2021-12-07 | End: 2021-12-08

## 2021-12-07 RX ORDER — FAMOTIDINE 10 MG/ML
20 INJECTION INTRAVENOUS ONCE
Refills: 0 | Status: COMPLETED | OUTPATIENT
Start: 2021-12-07 | End: 2021-12-07

## 2021-12-07 RX ORDER — DEXAMETHASONE 0.5 MG/5ML
12 ELIXIR ORAL ONCE
Refills: 0 | Status: COMPLETED | OUTPATIENT
Start: 2021-12-07 | End: 2021-12-07

## 2021-12-07 RX ORDER — DIPHENHYDRAMINE HCL 50 MG
50 CAPSULE ORAL ONCE
Refills: 0 | Status: COMPLETED | OUTPATIENT
Start: 2021-12-07 | End: 2021-12-07

## 2021-12-07 RX ORDER — DIPHENHYDRAMINE HCL 50 MG
50 CAPSULE ORAL ONCE
Refills: 0 | Status: COMPLETED | OUTPATIENT
Start: 2021-12-07 | End: 2021-12-08

## 2021-12-07 RX ORDER — CARBOPLATIN 50 MG
696.06 VIAL (EA) INTRAVENOUS ONCE
Refills: 0 | Status: COMPLETED | OUTPATIENT
Start: 2021-12-07 | End: 2021-12-07

## 2021-12-07 RX ORDER — CARBOPLATIN 50 MG
5 VIAL (EA) INTRAVENOUS ONCE
Refills: 0 | Status: COMPLETED | OUTPATIENT
Start: 2021-12-07 | End: 2021-12-07

## 2021-12-07 RX ORDER — EPINEPHRINE 0.3 MG/.3ML
0.3 INJECTION INTRAMUSCULAR; SUBCUTANEOUS ONCE
Refills: 0 | Status: DISCONTINUED | OUTPATIENT
Start: 2021-12-07 | End: 2021-12-08

## 2021-12-07 RX ORDER — ALBUTEROL 90 UG/1
2.5 AEROSOL, METERED ORAL ONCE
Refills: 0 | Status: DISCONTINUED | OUTPATIENT
Start: 2021-12-07 | End: 2021-12-08

## 2021-12-07 RX ORDER — PACLITAXEL 6 MG/ML
254 INJECTION, SOLUTION, CONCENTRATE INTRAVENOUS ONCE
Refills: 0 | Status: COMPLETED | OUTPATIENT
Start: 2021-12-07 | End: 2021-12-08

## 2021-12-07 RX ADMIN — Medication 0.5 MILLIGRAM(S): at 12:39

## 2021-12-07 RX ADMIN — FAMOTIDINE 20 MILLIGRAM(S): 10 INJECTION INTRAVENOUS at 11:33

## 2021-12-07 RX ADMIN — SODIUM CHLORIDE 100 MILLILITER(S): 9 INJECTION INTRAMUSCULAR; INTRAVENOUS; SUBCUTANEOUS at 12:41

## 2021-12-07 RX ADMIN — CETIRIZINE HYDROCHLORIDE 10 MILLIGRAM(S): 10 TABLET ORAL at 19:30

## 2021-12-07 RX ADMIN — Medication 696.06 MILLIGRAM(S): at 15:30

## 2021-12-07 RX ADMIN — Medication 50 MILLIGRAM(S): at 19:34

## 2021-12-07 RX ADMIN — FAMOTIDINE 20 MILLIGRAM(S): 10 INJECTION INTRAVENOUS at 23:41

## 2021-12-07 RX ADMIN — CETIRIZINE HYDROCHLORIDE 10 MILLIGRAM(S): 10 TABLET ORAL at 11:34

## 2021-12-07 RX ADMIN — ENOXAPARIN SODIUM 40 MILLIGRAM(S): 100 INJECTION SUBCUTANEOUS at 13:18

## 2021-12-07 RX ADMIN — Medication 106 MILLIGRAM(S): at 23:40

## 2021-12-07 RX ADMIN — Medication 5 MILLIGRAM(S): at 14:15

## 2021-12-07 RX ADMIN — Medication 650 MILLIGRAM(S): at 23:15

## 2021-12-07 NOTE — PROGRESS NOTE ADULT - ATTENDING COMMENTS
Patient seen at bedside in the MICU. She is tolerating carboplatin per AI desensitization protocol without any issues so far. Will closely monitor hemodynamics and for any drug reactions. Patient to complete C6 carboplatin and paclitaxel as planned. Will follow with you. Patient seen at bedside in the MICU. She is tolerating carboplatin per AI desensitization protocol without any issues so far. Will closely monitor hemodynamics and for any drug reactions. Patient to complete C6 carboplatin and paclitaxel as planned. Patient can f/u at [facility] in 1 week when ready for discharge as outpatient.

## 2021-12-07 NOTE — CHART NOTE - NSCHARTNOTEFT_GEN_A_CORE
CHIEF COMPLAINT:    HPI:    PAST MEDICAL & SURGICAL HISTORY:  Migraine    Vertigo    Asthma  mild intermittent    Kidney stone    Ovarian carcinoma    History of surgery  Pt is s/p Robotic assisted laparoscopic left salpingo-oophorectomy, right ovarian cystectomy, pelvic washings and tumor debulking on 6/15/21. On 7/6/21 she underwent Ex Lap, LINCOLN-RSO, omentectomy and CUSA ablation and optimal tumor debulking        FAMILY HISTORY:  FH: HTN (hypertension) (Father, Mother)    FH: sickle cell anemia (Sibling)        SOCIAL HISTORY:  Smoking: __ packs x ___ years  EtOH Use:  Marital Status:  Occupation:  Recent Travel:  Country of Birth:  Advance Directives:    Allergies    No Known Drug Allergies  Nuts (Headache)  onions, kiwi- headache (Other)    Intolerances        HOME MEDICATIONS:    REVIEW OF SYSTEMS:  Constitutional:   Eyes:  ENT:  CV:  Resp:  GI:  :  MSK:  Integumentary:  Neurological:  Psychiatric:  Endocrine:  Hematologic/Lymphatic:  Allergic/Immunologic:  [ ] All other systems negative  [ ] Unable to assess ROS because ________    OBJECTIVE:  ICU Vital Signs Last 24 Hrs  T(C): 36.2 (07 Dec 2021 13:09), Max: 37.2 (06 Dec 2021 21:10)  T(F): 97.2 (07 Dec 2021 13:09), Max: 99 (06 Dec 2021 21:10)  HR: 78 (07 Dec 2021 13:45) (74 - 100)  BP: 105/84 (07 Dec 2021 13:45) (96/69 - 119/81)  BP(mean): 90 (07 Dec 2021 13:45) (77 - 93)  ABP: --  ABP(mean): --  RR: 21 (07 Dec 2021 13:45) (15 - 21)  SpO2: 100% (07 Dec 2021 13:45) (98% - 100%)        CAPILLARY BLOOD GLUCOSE          PHYSICAL EXAM:  General:   HEENT:   Lymph Nodes:  Neck:   Respiratory:   Cardiovascular:   Abdomen:   Extremities:   Skin:   Neurological:  Psychiatry:    HOSPITAL MEDICATIONS:  MEDICATIONS  (STANDING):  chlorhexidine 2% Cloths 1 Application(s) Topical daily  dexAMETHasone  IVPB 12 milliGRAM(s) IV Intermittent once  diphenhydrAMINE Injectable 50 milliGRAM(s) IV Push once  enoxaparin Injectable 40 milliGRAM(s) SubCutaneous daily  famotidine    Tablet 20 milliGRAM(s) Oral once  fosaprepitant IVPB 150 milliGRAM(s) IV Intermittent once  influenza   Vaccine 0.5 milliLiter(s) IntraMuscular once  PACLitaxel IVPB (eMAR) 254 milliGRAM(s) IV Intermittent once  sodium chloride 0.9%. 1000 milliLiter(s) (100 mL/Hr) IV Continuous <Continuous>    MEDICATIONS  (PRN):  acetaminophen     Tablet .. 650 milliGRAM(s) Oral every 6 hours PRN Temp greater or equal to 38C (100.4F), Mild Pain (1 - 3)  ALBUTerol    0.083%. 2.5 milliGRAM(s) Nebulizer once PRN PRN Chemotherapy Reaction  aluminum hydroxide/magnesium hydroxide/simethicone Suspension 30 milliLiter(s) Oral every 4 hours PRN Dyspepsia  cetirizine 10 milliGRAM(s) Oral once PRN PRN Chemotherapy Reaction  diphenhydrAMINE Injectable 50 milliGRAM(s) IV Push once PRN PRN Chemotherapy Reaction  EPINEPHrine     1 mG/mL Injectable 0.3 milliGRAM(s) IntraMuscular once PRN PRN Chemotherapy Reaction  ibuprofen  Tablet. 600 milliGRAM(s) Oral every 6 hours PRN Severe Pain (7 - 10)  LORazepam     Tablet 1 milliGRAM(s) Oral once PRN PRN for anxiety 20 mins prior to Carboplatin Infusion  melatonin 3 milliGRAM(s) Oral at bedtime PRN Insomnia  ondansetron Injectable 4 milliGRAM(s) IV Push every 8 hours PRN Nausea and/or Vomiting  sodium chloride 0.65% Nasal 1 Spray(s) Both Nostrils four times a day PRN Nasal Congestion      LABS:                        10.8   5.29  )-----------( 193      ( 07 Dec 2021 07:55 )             32.0     12-07    141  |  104  |  19  ----------------------------<  98  4.1   |  26  |  0.69    Ca    9.5      07 Dec 2021 07:55    TPro  7.1  /  Alb  4.0  /  TBili  0.2  /  DBili  <0.2  /  AST  20  /  ALT  25  /  AlkPhos  68  12-07              MICROBIOLOGY:     RADIOLOGY:  [ ] Reviewed and interpreted by me    EKG: MICU Accept Note    Transfer from: 8N  Transfer to:  MICU bed 16    Hospital course:  HPI: 32 y/o F with Stage 3 Low Grade Serous Ovarian Carcinoma currently on Carbo/Taxol (C5 11/3/21) with C5 c/b transfusion reaction. Presenting now for Carboplatin C6 desensitization protocol.  Patient examined at bedside. Reports leg pain after cycle 5 of chemo. She discussed with oncologist, who recommended ibuprofen 600mg. She reports some relief. Otherwise, no medical concerns. No fevers, chills, chest pain, trouble breathing     Patient transferred to MICU for desensitization to receive carboplatin. Plan for carboplatin/paclitaxel 12/7. Plan to transfer back to medicine floors after chemo. Patient seen and examined at bedside. No acute complaints. ROS neg    ASSESSMENT & PLAN:   32 y/o F with Stage 3 Low Grade Serous Ovarian Carcinoma currently on Carbo/Taxol (C5 11/3/21) with C5 c/b transfusion reaction transferred to MICU for desensitization for chemotherapy (carbo/taxol).    #Neuro  -no active issues    #Cardiac  -no activue issues    #Respiratory  -no active issues  -albtuerol prn for asthma    #GI  -no active issues    #Heme/Onc  //Serous Ovarian Ca  -Plan for cycle 6 carboplatin desensitization   -premedication include cetirizine 10 mg PO and Famotidine 20 mg PO 1 hour before initiation of desensitization  -Carboplatin/Paclitaxel on 12/7  -see allergy and heme onc note for protocol   -lovenox dvt ppx    #Renal  -no active issues    #ID  -no active issues    									  Vital Signs Last 24 Hrs  T(C): 36.2 (07 Dec 2021 13:09), Max: 37.2 (06 Dec 2021 21:10)  T(F): 97.2 (07 Dec 2021 13:09), Max: 99 (06 Dec 2021 21:10)  HR: 102 (07 Dec 2021 15:44) (74 - 105)  BP: 92/74 (07 Dec 2021 15:44) (92/74 - 127/87)  BP(mean): 81 (07 Dec 2021 15:44) (77 - 105)  RR: 21 (07 Dec 2021 15:44) (15 - 24)  SpO2: 100% (07 Dec 2021 15:44) (98% - 100%)  I&O's Summary        MEDICATIONS  (STANDING):  chlorhexidine 2% Cloths 1 Application(s) Topical daily  dexAMETHasone  IVPB 12 milliGRAM(s) IV Intermittent once  diphenhydrAMINE Injectable 50 milliGRAM(s) IV Push once  enoxaparin Injectable 40 milliGRAM(s) SubCutaneous daily  famotidine    Tablet 20 milliGRAM(s) Oral once  fosaprepitant IVPB 150 milliGRAM(s) IV Intermittent once  influenza   Vaccine 0.5 milliLiter(s) IntraMuscular once  PACLitaxel IVPB (eMAR) 254 milliGRAM(s) IV Intermittent once  sodium chloride 0.9%. 1000 milliLiter(s) (100 mL/Hr) IV Continuous <Continuous>    MEDICATIONS  (PRN):  acetaminophen     Tablet .. 650 milliGRAM(s) Oral every 6 hours PRN Temp greater or equal to 38C (100.4F), Mild Pain (1 - 3)  ALBUTerol    0.083%. 2.5 milliGRAM(s) Nebulizer once PRN PRN Chemotherapy Reaction  aluminum hydroxide/magnesium hydroxide/simethicone Suspension 30 milliLiter(s) Oral every 4 hours PRN Dyspepsia  cetirizine 10 milliGRAM(s) Oral once PRN PRN Chemotherapy Reaction  diphenhydrAMINE Injectable 50 milliGRAM(s) IV Push once PRN PRN Chemotherapy Reaction  EPINEPHrine     1 mG/mL Injectable 0.3 milliGRAM(s) IntraMuscular once PRN PRN Chemotherapy Reaction  ibuprofen  Tablet. 600 milliGRAM(s) Oral every 6 hours PRN Severe Pain (7 - 10)  LORazepam     Tablet 1 milliGRAM(s) Oral once PRN PRN for anxiety 20 mins prior to Carboplatin Infusion  melatonin 3 milliGRAM(s) Oral at bedtime PRN Insomnia  ondansetron Injectable 4 milliGRAM(s) IV Push every 8 hours PRN Nausea and/or Vomiting  sodium chloride 0.65% Nasal 1 Spray(s) Both Nostrils four times a day PRN Nasal Congestion        LABS                                            10.8                  Neurophils% (auto):   33.5   (12-07 @ 07:55):    5.29 )-----------(193          Lymphocytes% (auto):  57.3                                          32.0                   Eosinphils% (auto):   0.8      Manual%: Neutrophils x    ; Lymphocytes x    ; Eosinophils x    ; Bands%: x    ; Blasts x                                    141    |  104    |  19                  Calcium: 9.5   / iCa: x      (12-07 @ 07:55)    ----------------------------<  98        Magnesium: x                                4.1     |  26     |  0.69             Phosphorous: x        TPro  7.1    /  Alb  4.0    /  TBili  0.2    /  DBili  <0.2   /  AST  20     /  ALT  25     /  AlkPhos  68     07 Dec 2021 08:00

## 2021-12-07 NOTE — PROGRESS NOTE ADULT - SUBJECTIVE AND OBJECTIVE BOX
INTERVAL HPI/OVERNIGHT EVENTS:  Patient S&E at bedside. No o/n events. Patient feels well this morning, but did not sleep well last night. She continues to have some muscle aches in her legs.     VITAL SIGNS:  T(F): 97.6 (12-07-21 @ 06:04)  HR: 84 (12-07-21 @ 06:04)  BP: 104/67 (12-07-21 @ 06:04)  RR: 16 (12-07-21 @ 06:04)  SpO2: 100% (12-07-21 @ 06:04)  Wt(kg): --    PHYSICAL EXAM:    Constitutional: NAD  Eyes: EOMI, sclera non-icteric  Neck: supple  Respiratory: CTAB, no wheezes or crackles   Cardiovascular: RRR  Gastrointestinal: soft, NTND, + BS  Extremities: no cyanosis, clubbing or edema   Neurological: awake and alert      MEDICATIONS  (STANDING):  chlorhexidine 2% Cloths 1 Application(s) Topical daily  enoxaparin Injectable 40 milliGRAM(s) SubCutaneous daily  influenza   Vaccine 0.5 milliLiter(s) IntraMuscular once    MEDICATIONS  (PRN):  acetaminophen     Tablet .. 650 milliGRAM(s) Oral every 6 hours PRN Temp greater or equal to 38C (100.4F), Mild Pain (1 - 3)  aluminum hydroxide/magnesium hydroxide/simethicone Suspension 30 milliLiter(s) Oral every 4 hours PRN Dyspepsia  ibuprofen  Tablet. 600 milliGRAM(s) Oral every 6 hours PRN Severe Pain (7 - 10)  melatonin 3 milliGRAM(s) Oral at bedtime PRN Insomnia  ondansetron Injectable 4 milliGRAM(s) IV Push every 8 hours PRN Nausea and/or Vomiting  sodium chloride 0.65% Nasal 1 Spray(s) Both Nostrils four times a day PRN Nasal Congestion      Allergies    No Known Drug Allergies  Nuts (Headache)  onions, kiwi- headache (Other)    Intolerances        LABS:                        10.8   5.29  )-----------( 193      ( 07 Dec 2021 07:55 )             32.0     12-07    141  |  104  |  19  ----------------------------<  98  4.1   |  26  |  0.69    Ca    9.5      07 Dec 2021 07:55    TPro  7.5  /  Alb  4.2  /  TBili  0.4  /  DBili  x   /  AST  23  /  ALT  31  /  AlkPhos  64  12-06          RADIOLOGY & ADDITIONAL TESTS:  Studies reviewed.

## 2021-12-07 NOTE — CHART NOTE - NSCHARTNOTEFT_GEN_A_CORE
MICU Transfer Note    Transfer from: MICU  Transfer to:  (  ) Medicine    (  ) Telemetry    (  ) RCU    (  ) Palliative    (  ) Stroke Unit    (  ) _______________  [facility]       [facility]  HPI: 30 y/o F with Stage 3 Low Grade Serous Ovarian Carcinoma currently on Carbo/Taxol (C5 11/3/21) with C5 c/b transfusion reaction. Presenting now for Carboplatin C6 desensitization protocol.  Patient examined at bedside. Reports leg pain after cycle 5 of chemo. She discussed with oncologist, who recommended ibuprofen 600mg. She reports some relief. Otherwise, no medical concerns. No fevers, chills, chest pain, trouble breathing     Patient transferred to MICU for desensitization to receive carboplatin. Scheduled for carboplatin/paclitaxel 12/7. Plan to transfer back to medicine floors after chemo. She tolerated chemo and no acute complaints. Patient can be transferred to medicine floors for further monitoring     ASSESSMENT & PLAN:         For Follow-Up:  [] daily CMP, pt/ptt, CBC, mg, P  [] type and screen   [] onc recs       Vital Signs Last 24 Hrs  T(C): 36.2 (07 Dec 2021 13:09), Max: 37.2 (06 Dec 2021 21:10)  T(F): 97.2 (07 Dec 2021 13:09), Max: 99 (06 Dec 2021 21:10)  HR: 88 (07 Dec 2021 16:15) (74 - 105)  BP: 111/80 (07 Dec 2021 16:15) (92/74 - 127/87)  BP(mean): 87 (07 Dec 2021 16:01) (77 - 105)  RR: 19 (07 Dec 2021 16:15) (15 - 24)  SpO2: 100% (07 Dec 2021 16:15) (98% - 100%)  I&O's Summary        MEDICATIONS  (STANDING):  chlorhexidine 2% Cloths 1 Application(s) Topical daily  dexAMETHasone  IVPB 12 milliGRAM(s) IV Intermittent once  diphenhydrAMINE Injectable 50 milliGRAM(s) IV Push once  enoxaparin Injectable 40 milliGRAM(s) SubCutaneous daily  famotidine    Tablet 20 milliGRAM(s) Oral once  fosaprepitant IVPB 150 milliGRAM(s) IV Intermittent once  influenza   Vaccine 0.5 milliLiter(s) IntraMuscular once  PACLitaxel IVPB (eMAR) 254 milliGRAM(s) IV Intermittent once  sodium chloride 0.9%. 1000 milliLiter(s) (100 mL/Hr) IV Continuous <Continuous>    MEDICATIONS  (PRN):  acetaminophen     Tablet .. 650 milliGRAM(s) Oral every 6 hours PRN Temp greater or equal to 38C (100.4F), Mild Pain (1 - 3)  ALBUTerol    0.083%. 2.5 milliGRAM(s) Nebulizer once PRN PRN Chemotherapy Reaction  aluminum hydroxide/magnesium hydroxide/simethicone Suspension 30 milliLiter(s) Oral every 4 hours PRN Dyspepsia  cetirizine 10 milliGRAM(s) Oral once PRN PRN Chemotherapy Reaction  diphenhydrAMINE Injectable 50 milliGRAM(s) IV Push once PRN PRN Chemotherapy Reaction  EPINEPHrine     1 mG/mL Injectable 0.3 milliGRAM(s) IntraMuscular once PRN PRN Chemotherapy Reaction  ibuprofen  Tablet. 600 milliGRAM(s) Oral every 6 hours PRN Severe Pain (7 - 10)  LORazepam     Tablet 1 milliGRAM(s) Oral once PRN PRN for anxiety 20 mins prior to Carboplatin Infusion  melatonin 3 milliGRAM(s) Oral at bedtime PRN Insomnia  ondansetron Injectable 4 milliGRAM(s) IV Push every 8 hours PRN Nausea and/or Vomiting  sodium chloride 0.65% Nasal 1 Spray(s) Both Nostrils four times a day PRN Nasal Congestion        LABS                                            10.8                  Neurophils% (auto):   33.5   (12-07 @ 07:55):    5.29 )-----------(193          Lymphocytes% (auto):  57.3                                          32.0                   Eosinphils% (auto):   0.8      Manual%: Neutrophils x    ; Lymphocytes x    ; Eosinophils x    ; Bands%: x    ; Blasts x                                    141    |  104    |  19                  Calcium: 9.5   / iCa: x      (12-07 @ 07:55)    ----------------------------<  98        Magnesium: x                                4.1     |  26     |  0.69             Phosphorous: x        TPro  7.1    /  Alb  4.0    /  TBili  0.2    /  DBili  <0.2   /  AST  20     /  ALT  25     /  AlkPhos  68     07 Dec 2021 08:00

## 2021-12-07 NOTE — PROGRESS NOTE ADULT - ASSESSMENT
30 y/o F with Stage 3 Low Grade Serous Ovarian Carcinoma currently on Carbo/Taxol (C5 11/3/21) with C5 c/b transfusion reaction. Presenting now for Carboplatin C6 desensitization protocol.      #History of Stage III low grade Serous Ovarian Carcinoma  -Patient follows with Dr. Jaylen Lopez at [facility] [place]  -Diagnosed jeff 1 year ago after presenting with abdominal pain.  -s/p RA-laparoscopic LSO and R ovarian cystectomy (6/15/21)  -s/p exp lap, LINCOLN-RSO, omentectomy, and optimal tumor debulking surgery (7/6/21)   -8/11/2021: Started adjuvant carbo/taxol  -C5 Carbo/Taxol on 11/3/21 complicated by carboplatin reaction (back pain).   -Admitted now for C6 Carboplatin desensitization  ---Per  outpatient note, patient will require transfer to MICU to receive chemotherapy--medicine team will need to coordinate with ICU regarding transfer. Chemotherapy will be ready to administer once patient is in the ICU. Spoke to ICU this morning and a bed is available. Chemo orders have been given to nursing staff along with dosing protocol. Plan to administer Carboplatin/Paclitaxel on 12/7.   -Hepatic panel added on to daily BMP. Please ensure this is collected.  -Check CBC, CMP, PT/INR, aPTT, Mg, Phos daily      *Per  protocol, If reactions occur:			  1. Temporarily halt the infusion and treat as appropriate:			  2. In case of mild allergic reaction (itching ,flushing,  hives, mild swelling, mild nausea/ discomfort), and back pain- immediately give 25-50 mg Benadryl IM/IV and additional cetirizine 10 mg  3. For wheezing- use 1 unit of  Albuterol  0.083% 3cc via nebulizer			  4. For severe systemic reaction and anaphylaxis, including laryngeal edema (voice changes, throat tightness, stridor, difficulty swallowing), respiratory (shortness of breath, wheezing, repetitive cough),	GI (repetitive vomiting, severe diarrhea), or cardiovascular symptoms (hypotension, hypotonia/ collapse)-give  0.3 cc 1:1000 Epinephrine IM immediately.		  5. The protocol should be aborted if the patient develops hypotension and/or laryngeal edema that are not immediately responsive to IM epinephrine			  6.  Refractory cases and/or those on beta blockers may require glucagon (1-2mg IV over 5 minutes); followed by 5-15mcg/min infusion if needed.			  7.  Resume the protocol by restarting at the same step the protocol had been paused.    Luz Maria James MD  Hematology/Oncology Fellow, PGY-5  Pager: [phone]  After 5pm and on weekends please page on-call fellow	 30 y/o F with Stage 3 Low Grade Serous Ovarian Carcinoma currently on Carbo/Taxol (C5 11/3/21) with C5 c/b transfusion reaction. Presenting now for Carboplatin C6 desensitization protocol.      #History of Stage III low grade Serous Ovarian Carcinoma  -Patient follows with Dr. Jaylen Lopez at [facility] [place]  -Diagnosed jeff 1 year ago after presenting with abdominal pain.  -s/p RA-laparoscopic LSO and R ovarian cystectomy (6/15/21)  -s/p exp lap, LINCOLN-RSO, omentectomy, and optimal tumor debulking surgery (7/6/21)   -8/11/2021: Started adjuvant carbo/taxol  -C5 Carbo/Taxol on 11/3/21 complicated by carboplatin reaction (back pain).   -Admitted now for C6 Carboplatin desensitization  ---Per  outpatient note, patient will require transfer to MICU to receive chemotherapy--medicine team will need to coordinate with ICU regarding transfer. Chemotherapy will be ready to administer once patient is in the ICU. Spoke to ICU this morning and a bed is available. Chemo orders have been given to nursing staff along with dosing protocol. Plan to administer Carboplatin/Paclitaxel on 12/7.   -Hepatic panel added on to daily BMP. Please ensure this is collected.  -Check CBC, CMP, PT/INR, aPTT, Mg, Phos daily      *Per  protocol, If reactions occur:			  1. Temporarily halt the infusion and treat as appropriate:			  2. In case of mild allergic reaction (itching ,flushing,  hives, mild swelling, mild nausea/ discomfort), and back pain- immediately give 25-50 mg Benadryl IM/IV and additional cetirizine 10 mg  3. For wheezing- use 1 unit of  Albuterol  0.083% 3cc via nebulizer			  4. For severe systemic reaction and anaphylaxis, including laryngeal edema (voice changes, throat tightness, stridor, difficulty swallowing), respiratory (shortness of breath, wheezing, repetitive cough),	GI (repetitive vomiting, severe diarrhea), or cardiovascular symptoms (hypotension, hypotonia/ collapse)-give  0.3 cc 1:1000 Epinephrine IM immediately.		  5. The protocol should be aborted if the patient develops hypotension and/or laryngeal edema that are not immediately responsive to IM epinephrine			  6.  Refractory cases and/or those on beta blockers may require glucagon (1-2mg IV over 5 minutes); followed by 5-15mcg/min infusion if needed.			  7.  Resume the protocol by restarting at the same step the protocol had been paused.    Would monitor patient overnight following completion of chemotherapy.     Luz Maria James MD  Hematology/Oncology Fellow, PGY-5  Pager: [phone]  After 5pm and on weekends please page on-call fellow

## 2021-12-08 ENCOUNTER — OUTPATIENT (OUTPATIENT)
Dept: OUTPATIENT SERVICES | Facility: HOSPITAL | Age: 31
LOS: 1 days | Discharge: HOME | End: 2021-12-08

## 2021-12-08 ENCOUNTER — TRANSCRIPTION ENCOUNTER (OUTPATIENT)
Age: 31
End: 2021-12-08

## 2021-12-08 VITALS
RESPIRATION RATE: 20 BRPM | DIASTOLIC BLOOD PRESSURE: 73 MMHG | OXYGEN SATURATION: 100 % | HEART RATE: 84 BPM | SYSTOLIC BLOOD PRESSURE: 115 MMHG

## 2021-12-08 DIAGNOSIS — Z98.890 OTHER SPECIFIED POSTPROCEDURAL STATES: Chronic | ICD-10-CM

## 2021-12-08 DIAGNOSIS — C56.9 MALIGNANT NEOPLASM OF UNSPECIFIED OVARY: ICD-10-CM

## 2021-12-08 DIAGNOSIS — F43.23 ADJUSTMENT DISORDER WITH MIXED ANXIETY AND DEPRESSED MOOD: ICD-10-CM

## 2021-12-08 LAB
ALBUMIN SERPL ELPH-MCNC: 4.1 G/DL — SIGNIFICANT CHANGE UP (ref 3.3–5)
ALP SERPL-CCNC: 65 U/L — SIGNIFICANT CHANGE UP (ref 40–120)
ALT FLD-CCNC: 30 U/L — SIGNIFICANT CHANGE UP (ref 4–33)
ANION GAP SERPL CALC-SCNC: 12 MMOL/L — SIGNIFICANT CHANGE UP (ref 7–14)
APTT BLD: 34.9 SEC — SIGNIFICANT CHANGE UP (ref 27–36.3)
AST SERPL-CCNC: 24 U/L — SIGNIFICANT CHANGE UP (ref 4–32)
BASOPHILS # BLD AUTO: 0 K/UL — SIGNIFICANT CHANGE UP (ref 0–0.2)
BASOPHILS NFR BLD AUTO: 0 % — SIGNIFICANT CHANGE UP (ref 0–2)
BILIRUB SERPL-MCNC: 0.5 MG/DL — SIGNIFICANT CHANGE UP (ref 0.2–1.2)
BUN SERPL-MCNC: 10 MG/DL — SIGNIFICANT CHANGE UP (ref 7–23)
CALCIUM SERPL-MCNC: 9.6 MG/DL — SIGNIFICANT CHANGE UP (ref 8.4–10.5)
CHLORIDE SERPL-SCNC: 105 MMOL/L — SIGNIFICANT CHANGE UP (ref 98–107)
CO2 SERPL-SCNC: 21 MMOL/L — LOW (ref 22–31)
CREAT SERPL-MCNC: 0.65 MG/DL — SIGNIFICANT CHANGE UP (ref 0.5–1.3)
EOSINOPHIL # BLD AUTO: 0 K/UL — SIGNIFICANT CHANGE UP (ref 0–0.5)
EOSINOPHIL NFR BLD AUTO: 0 % — SIGNIFICANT CHANGE UP (ref 0–6)
GLUCOSE SERPL-MCNC: 125 MG/DL — HIGH (ref 70–99)
HCT VFR BLD CALC: 33.9 % — LOW (ref 34.5–45)
HCT VFR BLD CALC: 34.6 % — SIGNIFICANT CHANGE UP (ref 34.5–45)
HGB BLD-MCNC: 11.4 G/DL — LOW (ref 11.5–15.5)
HGB BLD-MCNC: 11.5 G/DL — SIGNIFICANT CHANGE UP (ref 11.5–15.5)
IANC: 5.55 K/UL — SIGNIFICANT CHANGE UP (ref 1.5–8.5)
IMM GRANULOCYTES NFR BLD AUTO: 0.6 % — SIGNIFICANT CHANGE UP (ref 0–1.5)
INR BLD: 1.18 RATIO — HIGH (ref 0.88–1.16)
LYMPHOCYTES # BLD AUTO: 0.54 K/UL — LOW (ref 1–3.3)
LYMPHOCYTES # BLD AUTO: 8.7 % — LOW (ref 13–44)
MAGNESIUM SERPL-MCNC: 1.4 MG/DL — LOW (ref 1.6–2.6)
MCHC RBC-ENTMCNC: 29.2 PG — SIGNIFICANT CHANGE UP (ref 27–34)
MCHC RBC-ENTMCNC: 29.7 PG — SIGNIFICANT CHANGE UP (ref 27–34)
MCHC RBC-ENTMCNC: 33.2 GM/DL — SIGNIFICANT CHANGE UP (ref 32–36)
MCHC RBC-ENTMCNC: 33.6 GM/DL — SIGNIFICANT CHANGE UP (ref 32–36)
MCV RBC AUTO: 87.8 FL — SIGNIFICANT CHANGE UP (ref 80–100)
MCV RBC AUTO: 88.3 FL — SIGNIFICANT CHANGE UP (ref 80–100)
MONOCYTES # BLD AUTO: 0.08 K/UL — SIGNIFICANT CHANGE UP (ref 0–0.9)
MONOCYTES NFR BLD AUTO: 1.3 % — LOW (ref 2–14)
NEUTROPHILS # BLD AUTO: 5.55 K/UL — SIGNIFICANT CHANGE UP (ref 1.8–7.4)
NEUTROPHILS NFR BLD AUTO: 89.4 % — HIGH (ref 43–77)
NRBC # BLD: 0 /100 WBCS — SIGNIFICANT CHANGE UP
NRBC # BLD: 0 /100 WBCS — SIGNIFICANT CHANGE UP
NRBC # FLD: 0 K/UL — SIGNIFICANT CHANGE UP
NRBC # FLD: 0 K/UL — SIGNIFICANT CHANGE UP
PHOSPHATE SERPL-MCNC: 2.4 MG/DL — LOW (ref 2.5–4.5)
PLATELET # BLD AUTO: 201 K/UL — SIGNIFICANT CHANGE UP (ref 150–400)
PLATELET # BLD AUTO: 229 K/UL — SIGNIFICANT CHANGE UP (ref 150–400)
POTASSIUM SERPL-MCNC: 4.3 MMOL/L — SIGNIFICANT CHANGE UP (ref 3.5–5.3)
POTASSIUM SERPL-SCNC: 4.3 MMOL/L — SIGNIFICANT CHANGE UP (ref 3.5–5.3)
PROT SERPL-MCNC: 7.5 G/DL — SIGNIFICANT CHANGE UP (ref 6–8.3)
PROTHROM AB SERPL-ACNC: 13.4 SEC — SIGNIFICANT CHANGE UP (ref 10.6–13.6)
RBC # BLD: 3.84 M/UL — SIGNIFICANT CHANGE UP (ref 3.8–5.2)
RBC # BLD: 3.94 M/UL — SIGNIFICANT CHANGE UP (ref 3.8–5.2)
RBC # FLD: 14.1 % — SIGNIFICANT CHANGE UP (ref 10.3–14.5)
RBC # FLD: 14.7 % — HIGH (ref 10.3–14.5)
SARS-COV-2 N GENE NPH QL NAA+PROBE: NOT DETECTED
SODIUM SERPL-SCNC: 138 MMOL/L — SIGNIFICANT CHANGE UP (ref 135–145)
WBC # BLD: 6.21 K/UL — SIGNIFICANT CHANGE UP (ref 3.8–10.5)
WBC # BLD: 6.86 K/UL — SIGNIFICANT CHANGE UP (ref 3.8–10.5)
WBC # FLD AUTO: 6.21 K/UL — SIGNIFICANT CHANGE UP (ref 3.8–10.5)
WBC # FLD AUTO: 6.86 K/UL — SIGNIFICANT CHANGE UP (ref 3.8–10.5)

## 2021-12-08 PROCEDURE — 99233 SBSQ HOSP IP/OBS HIGH 50: CPT | Mod: GC

## 2021-12-08 RX ORDER — FAMOTIDINE 10 MG/ML
20 INJECTION INTRAVENOUS ONCE
Refills: 0 | Status: COMPLETED | OUTPATIENT
Start: 2021-12-08 | End: 2021-12-08

## 2021-12-08 RX ORDER — SODIUM,POTASSIUM PHOSPHATES 278-250MG
2 POWDER IN PACKET (EA) ORAL ONCE
Refills: 0 | Status: COMPLETED | OUTPATIENT
Start: 2021-12-08 | End: 2021-12-08

## 2021-12-08 RX ORDER — MAGNESIUM SULFATE 500 MG/ML
2 VIAL (ML) INJECTION ONCE
Refills: 0 | Status: COMPLETED | OUTPATIENT
Start: 2021-12-08 | End: 2021-12-08

## 2021-12-08 RX ORDER — ALBUTEROL 90 UG/1
2 AEROSOL, METERED ORAL
Qty: 0 | Refills: 0 | DISCHARGE

## 2021-12-08 RX ADMIN — FOSAPREPITANT DIMEGLUMINE 465 MILLIGRAM(S): 150 INJECTION, POWDER, LYOPHILIZED, FOR SOLUTION INTRAVENOUS at 00:14

## 2021-12-08 RX ADMIN — CHLORHEXIDINE GLUCONATE 1 APPLICATION(S): 213 SOLUTION TOPICAL at 12:09

## 2021-12-08 RX ADMIN — Medication 50 MILLIGRAM(S): at 00:14

## 2021-12-08 RX ADMIN — Medication 2 TABLET(S): at 09:34

## 2021-12-08 RX ADMIN — PACLITAXEL 166.67 MILLIGRAM(S): 6 INJECTION, SOLUTION, CONCENTRATE INTRAVENOUS at 00:48

## 2021-12-08 RX ADMIN — Medication 650 MILLIGRAM(S): at 00:15

## 2021-12-08 RX ADMIN — FAMOTIDINE 20 MILLIGRAM(S): 10 INJECTION INTRAVENOUS at 00:37

## 2021-12-08 RX ADMIN — ENOXAPARIN SODIUM 40 MILLIGRAM(S): 100 INJECTION SUBCUTANEOUS at 11:28

## 2021-12-08 RX ADMIN — Medication 25 GRAM(S): at 09:33

## 2021-12-08 RX ADMIN — ONDANSETRON 4 MILLIGRAM(S): 8 TABLET, FILM COATED ORAL at 00:04

## 2021-12-08 NOTE — DISCHARGE NOTE PROVIDER - NSDCCPCAREPLAN_GEN_ALL_CORE_FT
PRINCIPAL DISCHARGE DIAGNOSIS  Diagnosis: Ovarian carcinoma  Assessment and Plan of Treatment: You came to the hospital for your 6th round of chemotherapy. We transferred to you to MICU for desensitization of the carboplatin. You received your chemothrapy on 12/7 with mild reaction. We have you benadryl, pepcid, and benadryl. Please follow up with your oncologist within 1 week.

## 2021-12-08 NOTE — PROGRESS NOTE ADULT - ASSESSMENT
30 y/o F with Stage 3 Low Grade Serous Ovarian Carcinoma currently on Carbo/Taxol (C5 11/3/21) with C5 c/b transfusion reaction transferred to MICU for desensitization for chemotherapy (carbo/taxol).    #Neuro  -no active issues    #Cardiac  -no activue issues    #Respiratory  -no active issues  -albtuerol prn for asthma    #GI  -no active issues    #Heme/Onc  //Serous Ovarian Ca  -s/p cycle 6 carboplatin desensitization 12/7  -premedication include cetirizine 10 mg PO and Famotidine 20 mg PO 1 hour before initiation of desensitization  -Carboplatin/Paclitaxel on 12/7  -see allergy and heme onc note for protocol   -lovenox dvt ppx    #Renal  -no active issues    #ID  -no active issues    Dispo: home with heme onc follow up in 1 week

## 2021-12-08 NOTE — DISCHARGE NOTE PROVIDER - HOSPITAL COURSE
HPI: 30 y/o F with Stage 3 Low Grade Serous Ovarian Carcinoma currently on Carbo/Taxol (C5 11/3/21) with C5 c/b transfusion reaction. Presenting now for Carboplatin C6 desensitization protocol.  Patient examined at bedside. Reports leg pain after cycle 5 of chemo. She discussed with oncologist, who recommended ibuprofen 600mg. She reports some relief. Otherwise, no medical concerns. No fevers, chills, chest pain, trouble breathing     Patient transferred to MICU for desensitization to receive carboplatin. Recieved carboplatin/paclitaxel 12/7. Mild reaction with chest tightness, N/V, itchy, headache which resolved with zofran, benadryl, pepcid. Patient will be discharged home with outpatient follow up with oncology

## 2021-12-08 NOTE — DISCHARGE NOTE NURSING/CASE MANAGEMENT/SOCIAL WORK - NSDCPEFALRISK_GEN_ALL_CORE
For information on Fall & Injury Prevention, visit: [email or web address] OR  [email or web address] OR  https://www.cdc.gov/steadi/patient.html

## 2021-12-08 NOTE — DISCHARGE NOTE PROVIDER - CARE PROVIDER_API CALL
Jaylen Lopez)  Internal Medicine; Medical Oncology  [address] [place] [address]  Phone: [phone]  Fax: ()-  Established Patient  Follow Up Time: 1 week

## 2021-12-08 NOTE — CHART NOTE - NSCHARTNOTEFT_GEN_A_CORE
ONCOLOGY FELLOW NOTE    Patient seen today at bedside in the MICU. She tolerated chemotherapy well and feels good today. She is happy to have finished chemotherapy.    Oncology has no contraindication to discharge if patient is otherwise medically cleared from her primary team.    Patient will follow-up with Dr. Jaylen Lopez within one week of discharge for ongoing oncologic care.    Luz Maria James MD  Hematology/Oncology Fellow, PGY-5  Pager: [phone]  After 5pm and on weekends please page on-call fellow

## 2021-12-08 NOTE — PROGRESS NOTE ADULT - ATTENDING COMMENTS
31 F with stage 3 ovarian ca here for chemo densensitization.    s/p desensitization overnight.  No adverse effects.  f/u heme onc reccs

## 2021-12-08 NOTE — PROGRESS NOTE ADULT - SUBJECTIVE AND OBJECTIVE BOX
SUBJECTIVE / OVERNIGHT EVENTS: Patient had no acute events overnight. Patient seen and examined at bedside this morning.     ROS: unable to assess    OBJECTIVE:  ICU Vital Signs Last 24 Hrs  T(C): 36.6 (08 Dec 2021 00:29), Max: 36.7 (07 Dec 2021 19:11)  T(F): 97.8 (08 Dec 2021 00:29), Max: 98.1 (07 Dec 2021 19:11)  HR: 79 (08 Dec 2021 05:00) (66 - 105)  BP: 111/79 (08 Dec 2021 05:00) (92/74 - 142/88)  BP(mean): 89 (08 Dec 2021 05:00) (77 - 105)  ABP: --  ABP(mean): --  RR: 17 (08 Dec 2021 05:00) (15 - 24)  SpO2: 100% (08 Dec 2021 05:00) (100% - 100%)        CAPILLARY BLOOD GLUCOSE          PHYSICAL EXAM:    GENERAL: NAD, lying comfortably in bed  HEENT:  Atraumatic, Normocephalic, EOMI,  conjunctiva and sclera clear, supple  CHEST/LUNG: Clear to auscultation bilaterally; No wheeze  HEART: RRR with S1 and S2; No murmurs, rubs, or gallops  ABDOMEN: Soft, Nontender, Nondistended; Bowel sounds present  EXTREMITIES:  2+ Peripheral Pulses, No LE edema  NEURO: intubated and sedated. Not following commands  SKIN: No rashes or lesions      LINES:    HOSPITAL MEDICATIONS:  Standing Meds:  chlorhexidine 2% Cloths 1 Application(s) Topical daily  enoxaparin Injectable 40 milliGRAM(s) SubCutaneous daily  influenza   Vaccine 0.5 milliLiter(s) IntraMuscular once  sodium chloride 0.9%. 1000 milliLiter(s) IV Continuous <Continuous>      PRN Meds:  acetaminophen     Tablet .. 650 milliGRAM(s) Oral every 6 hours PRN  ALBUTerol    0.083%. 2.5 milliGRAM(s) Nebulizer once PRN  aluminum hydroxide/magnesium hydroxide/simethicone Suspension 30 milliLiter(s) Oral every 4 hours PRN  EPINEPHrine     1 mG/mL Injectable 0.3 milliGRAM(s) IntraMuscular once PRN  ibuprofen  Tablet. 600 milliGRAM(s) Oral every 6 hours PRN  LORazepam     Tablet 1 milliGRAM(s) Oral once PRN  melatonin 3 milliGRAM(s) Oral at bedtime PRN  ondansetron Injectable 4 milliGRAM(s) IV Push every 8 hours PRN  sodium chloride 0.65% Nasal 1 Spray(s) Both Nostrils four times a day PRN      LABS:                        11.4   6.86  )-----------( 201      ( 08 Dec 2021 04:39 )             33.9     Hgb Trend: 11.4<--, 10.8<--, 10.1<--  12-08    138  |  105  |  10  ----------------------------<  125<H>  4.3   |  21<L>  |  0.65    Ca    9.6      08 Dec 2021 04:39  Phos  2.4     12-08  Mg     1.40     12-08    TPro  7.5  /  Alb  4.1  /  TBili  0.5  /  DBili  x   /  AST  24  /  ALT  30  /  AlkPhos  65  12-08    Creatinine Trend: 0.65<--, 0.69<--, 0.62<--  PT/INR - ( 08 Dec 2021 04:39 )   PT: 13.4 sec;   INR: 1.18 ratio         PTT - ( 08 Dec 2021 04:39 )  PTT:34.9 sec          MICROBIOLOGY:     RADIOLOGY:  [ ] Reviewed and interpreted by me     SUBJECTIVE / OVERNIGHT EVENTS: Overnight after completing carboplatin, patient endorsed headache, itch, rash, chest tightness, emsis and nausea similar to prior reaction. Improved with pepcid, zofran and benadryl. Patient seen and examined at bedside this morning. No acute complaints. Urinating well and ambulating    ROS: neg SOB, CP, ab pain, F/C    OBJECTIVE:  ICU Vital Signs Last 24 Hrs  T(C): 36.6 (08 Dec 2021 00:29), Max: 36.7 (07 Dec 2021 19:11)  T(F): 97.8 (08 Dec 2021 00:29), Max: 98.1 (07 Dec 2021 19:11)  HR: 79 (08 Dec 2021 05:00) (66 - 105)  BP: 111/79 (08 Dec 2021 05:00) (92/74 - 142/88)  BP(mean): 89 (08 Dec 2021 05:00) (77 - 105)  ABP: --  ABP(mean): --  RR: 17 (08 Dec 2021 05:00) (15 - 24)  SpO2: 100% (08 Dec 2021 05:00) (100% - 100%)        CAPILLARY BLOOD GLUCOSE          PHYSICAL EXAM:    GENERAL: NAD, lying comfortably in bed  HEENT:  Atraumatic, Normocephalic, EOMI,  conjunctiva and sclera clear, supple  CHEST/LUNG: Clear to auscultation bilaterally; No wheeze  HEART: RRR with S1 and S2; No murmurs, rubs, or gallops  ABDOMEN: Soft, Nontender, Nondistended; Bowel sounds present  EXTREMITIES:  2+ Peripheral Pulses, No LE edema  NEURO: AOx4, moving all extremities  SKIN: No rashes or lesions    LINES:    HOSPITAL MEDICATIONS:  Standing Meds:  chlorhexidine 2% Cloths 1 Application(s) Topical daily  enoxaparin Injectable 40 milliGRAM(s) SubCutaneous daily  influenza   Vaccine 0.5 milliLiter(s) IntraMuscular once  sodium chloride 0.9%. 1000 milliLiter(s) IV Continuous <Continuous>      PRN Meds:  acetaminophen     Tablet .. 650 milliGRAM(s) Oral every 6 hours PRN  ALBUTerol    0.083%. 2.5 milliGRAM(s) Nebulizer once PRN  aluminum hydroxide/magnesium hydroxide/simethicone Suspension 30 milliLiter(s) Oral every 4 hours PRN  EPINEPHrine     1 mG/mL Injectable 0.3 milliGRAM(s) IntraMuscular once PRN  ibuprofen  Tablet. 600 milliGRAM(s) Oral every 6 hours PRN  LORazepam     Tablet 1 milliGRAM(s) Oral once PRN  melatonin 3 milliGRAM(s) Oral at bedtime PRN  ondansetron Injectable 4 milliGRAM(s) IV Push every 8 hours PRN  sodium chloride 0.65% Nasal 1 Spray(s) Both Nostrils four times a day PRN      LABS:                        11.4   6.86  )-----------( 201      ( 08 Dec 2021 04:39 )             33.9     Hgb Trend: 11.4<--, 10.8<--, 10.1<--  12-08    138  |  105  |  10  ----------------------------<  125<H>  4.3   |  21<L>  |  0.65    Ca    9.6      08 Dec 2021 04:39  Phos  2.4     12-08  Mg     1.40     12-08    TPro  7.5  /  Alb  4.1  /  TBili  0.5  /  DBili  x   /  AST  24  /  ALT  30  /  AlkPhos  65  12-08    Creatinine Trend: 0.65<--, 0.69<--, 0.62<--  PT/INR - ( 08 Dec 2021 04:39 )   PT: 13.4 sec;   INR: 1.18 ratio         PTT - ( 08 Dec 2021 04:39 )  PTT:34.9 sec          MICROBIOLOGY:     RADIOLOGY:  [ ] Reviewed and interpreted by me

## 2021-12-08 NOTE — DISCHARGE NOTE PROVIDER - NSDCFUSCHEDAPPT_GEN_ALL_CORE_FT
GAVIN JEAN ; 12/09/2021 ; [facility]  [facility]AVIN JE[facility] ; [facility]/20/2021 ; [facility]  GAVIN JEAN ; 12/20/2021 ; [facility]  GAVIN JEAN ; 12/22/2021 ; [facility]  GAVIN JEAN ; 12/22/2021 ; [facility]

## 2021-12-08 NOTE — DISCHARGE NOTE NURSING/CASE MANAGEMENT/SOCIAL WORK - PATIENT PORTAL LINK FT
You can access the FollowMyHealth Patient Portal offered by [facility] by registering at the following website: [email or web address]. By joining [facility]’s FollowMyHealth portal, you will also be able to view your health information using other applications (apps) compatible with our system.

## 2021-12-09 ENCOUNTER — APPOINTMENT (OUTPATIENT)
Dept: HEMATOLOGY ONCOLOGY | Facility: CLINIC | Age: 31
End: 2021-12-09
Payer: MEDICAID

## 2021-12-09 PROCEDURE — 90832 PSYTX W PT 30 MINUTES: CPT | Mod: 95

## 2021-12-15 ENCOUNTER — APPOINTMENT (OUTPATIENT)
Dept: HEMATOLOGY ONCOLOGY | Facility: CLINIC | Age: 31
End: 2021-12-15
Payer: MEDICAID

## 2021-12-15 ENCOUNTER — RESULT REVIEW (OUTPATIENT)
Age: 31
End: 2021-12-15

## 2021-12-15 ENCOUNTER — APPOINTMENT (OUTPATIENT)
Dept: INFUSION THERAPY | Facility: HOSPITAL | Age: 31
End: 2021-12-15

## 2021-12-15 VITALS
HEART RATE: 107 BPM | RESPIRATION RATE: 16 BRPM | SYSTOLIC BLOOD PRESSURE: 118 MMHG | DIASTOLIC BLOOD PRESSURE: 86 MMHG | BODY MASS INDEX: 37.04 KG/M2 | TEMPERATURE: 98.2 F | HEIGHT: 61.02 IN | WEIGHT: 196.21 LBS | OXYGEN SATURATION: 96 %

## 2021-12-15 LAB
ALBUMIN SERPL ELPH-MCNC: 4.6 G/DL — SIGNIFICANT CHANGE UP (ref 3.3–5)
ALP SERPL-CCNC: 61 U/L — SIGNIFICANT CHANGE UP (ref 40–120)
ALT FLD-CCNC: 65 U/L — HIGH (ref 10–45)
ANION GAP SERPL CALC-SCNC: 15 MMOL/L — SIGNIFICANT CHANGE UP (ref 5–17)
AST SERPL-CCNC: 33 U/L — SIGNIFICANT CHANGE UP (ref 10–40)
BILIRUB SERPL-MCNC: 0.3 MG/DL — SIGNIFICANT CHANGE UP (ref 0.2–1.2)
BUN SERPL-MCNC: 12 MG/DL — SIGNIFICANT CHANGE UP (ref 7–23)
CALCIUM SERPL-MCNC: 9.7 MG/DL — SIGNIFICANT CHANGE UP (ref 8.4–10.5)
CANCER AG125 SERPL-ACNC: 6 U/ML — SIGNIFICANT CHANGE UP
CHLORIDE SERPL-SCNC: 100 MMOL/L — SIGNIFICANT CHANGE UP (ref 96–108)
CO2 SERPL-SCNC: 23 MMOL/L — SIGNIFICANT CHANGE UP (ref 22–31)
CREAT SERPL-MCNC: 0.69 MG/DL — SIGNIFICANT CHANGE UP (ref 0.5–1.3)
GLUCOSE SERPL-MCNC: 120 MG/DL — HIGH (ref 70–99)
HCT VFR BLD CALC: 31.8 % — LOW (ref 34.5–45)
HGB BLD-MCNC: 10.5 G/DL — LOW (ref 11.5–15.5)
MAGNESIUM SERPL-MCNC: 1.6 MG/DL — SIGNIFICANT CHANGE UP (ref 1.6–2.6)
MCHC RBC-ENTMCNC: 29.6 PG — SIGNIFICANT CHANGE UP (ref 27–34)
MCHC RBC-ENTMCNC: 33 G/DL — SIGNIFICANT CHANGE UP (ref 32–36)
MCV RBC AUTO: 89.6 FL — SIGNIFICANT CHANGE UP (ref 80–100)
PLATELET # BLD AUTO: 189 K/UL — SIGNIFICANT CHANGE UP (ref 150–400)
POTASSIUM SERPL-MCNC: 4 MMOL/L — SIGNIFICANT CHANGE UP (ref 3.5–5.3)
POTASSIUM SERPL-SCNC: 4 MMOL/L — SIGNIFICANT CHANGE UP (ref 3.5–5.3)
PROT SERPL-MCNC: 7.4 G/DL — SIGNIFICANT CHANGE UP (ref 6–8.3)
RBC # BLD: 3.55 M/UL — LOW (ref 3.8–5.2)
RBC # FLD: 13.9 % — SIGNIFICANT CHANGE UP (ref 10.3–14.5)
SODIUM SERPL-SCNC: 139 MMOL/L — SIGNIFICANT CHANGE UP (ref 135–145)
WBC # BLD: 3.8 K/UL — SIGNIFICANT CHANGE UP (ref 3.8–10.5)
WBC # FLD AUTO: 3.8 K/UL — SIGNIFICANT CHANGE UP (ref 3.8–10.5)

## 2021-12-15 PROCEDURE — 99214 OFFICE O/P EST MOD 30 MIN: CPT

## 2021-12-16 ENCOUNTER — NON-APPOINTMENT (OUTPATIENT)
Age: 31
End: 2021-12-16

## 2021-12-16 DIAGNOSIS — Z51.11 ENCOUNTER FOR ANTINEOPLASTIC CHEMOTHERAPY: ICD-10-CM

## 2021-12-16 DIAGNOSIS — R11.2 NAUSEA WITH VOMITING, UNSPECIFIED: ICD-10-CM

## 2021-12-17 ENCOUNTER — APPOINTMENT (OUTPATIENT)
Dept: HEMATOLOGY ONCOLOGY | Facility: CLINIC | Age: 31
End: 2021-12-17

## 2021-12-20 ENCOUNTER — APPOINTMENT (OUTPATIENT)
Dept: HEMATOLOGY ONCOLOGY | Facility: CLINIC | Age: 31
End: 2021-12-20

## 2021-12-21 ENCOUNTER — OUTPATIENT (OUTPATIENT)
Dept: OUTPATIENT SERVICES | Facility: HOSPITAL | Age: 31
LOS: 1 days | Discharge: HOME | End: 2021-12-21

## 2021-12-21 DIAGNOSIS — Z15.89 GENETIC SUSCEPTIBILITY TO OTHER DISEASE: ICD-10-CM

## 2021-12-21 DIAGNOSIS — Z98.890 OTHER SPECIFIED POSTPROCEDURAL STATES: Chronic | ICD-10-CM

## 2021-12-22 ENCOUNTER — APPOINTMENT (OUTPATIENT)
Dept: HEMATOLOGY ONCOLOGY | Facility: CLINIC | Age: 31
End: 2021-12-22

## 2021-12-22 NOTE — DISCUSSION/SUMMARY
[FreeTextEntry1] : The visit was provided via telehealth using real-time 2-way audio visual technology. The patient, Neli Rodrigez, was located at home, [place], [place], at the time of the visit. The provider, Stephen Gomez, was located at the medical office located in [place], [place] at the time of the visit. The patient, Neli Rodrigez and Provider participated in the telehealth encounter. Consent for telehealth services was given on 2021 by the patient, Neli Rodrigez.\par \par REASON FOR CONSULT\par Neli Rodrigez is a 31-year-old female who was referred by Dr. 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 \par \par RELEVANT MEDICAL HISTORY\par Ms. Quinteros was diagnosed with ovarian carcinoma (low grade serous, stage IIIc) in 2021 at age 31 after presenting with a R-complex ovarian mass and pursuing an LSO and R-ovary cystectomy (06/15/2021).  She was treated with LINCOLN-RSO, omentectomy and optimal tumor debulking (2021) and chemotherapy (completed 2021).  \par \par Ms. 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 This test was ordered by Dr. Jaylen Lopez and reported on 2021. \par \par OTHER MEDICAL AND SURGICAL HISTORY:\par Vertigo. Migraines. Kidney stones. \par \par PAST OB/GYN HISTORY:\par Obstetrical History: \par Age at Menarche: 11\par Surgical menopause at age 31 \par Age at First Live Birth: N/A\par Contraceptive Use: Yes, OCP on/off 9 years and IUD for less than one year. \par Hormone Replacement Therapy: No\par \par CANCER SCREENING HISTORY:  \par Breast: None\par Colon: None\par Skin: Last exam over 10 years ago, no biopsies reported. No concerns reported today. \par \par SOCIAL HISTORY:\par •	Tobacco-product use: Yes, formerly, social smoking for a short period of time. \par \par FAMILY HISTORY:\par Maternal ancestry was reported as [place] and paternal ancestry was reported [place] and [place]. No Ashkenazi Confucianism heritage reported. A detailed family history of cancer was ascertained, see below and scanned chart for pedigree. \par \par To Ms. Rodrigez’ knowledge, no one else in the family has had germline testing for cancer susceptibility.  \par 	\par RESULTS INTERPRETATION AND ASSESSMENT:\par NO known disease-causing mutations were identified in any of the 84 genes tested. 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 At this time the available evidence is insufficient to determine the role of this variant in disease and the clinical significance of this result is uncertain. Individuals with a pathogenic mutation in MSH6 may have an increased risk for cancers associated with Infante Syndrome. It is unknown if the patient has an increased risk for the cancers associated with MSH6 at this time. In order to further clarify this MSH6 VUS, Dr. Lopez was emailed 2021to order immunohistochemistry (IHC) analysis for mismatch repair proteins (MMR) and microsatellite instability (MSI) analysis on the tumor. Once the results are available, we will review them with Ms. Lopez. \par \par The detection of this VUS does NOT currently change the patient’s medical management. It is NOT recommended at this time that family members use this result for predictive genetic testing or medical management decisions. With more research, a VUS may be reclassified as either disease-causing or benign. The patient was encouraged to contact us every 2-3 years to enquire about any new information for this variant, or sooner if there are any changes in her personal or family history of cancer.  \par \par We discussed that the cause of the patient’s cancer and family history of cancer remains unknown and that this result does not rule out a hereditary cancer risk in the patient since it is possible, although unlikely, the patient has a mutation that is not detectable by this analysis or is in an unidentified gene. It is also possible there is a hereditary cancer predisposition in the family, but the patient did not inherit it.\par \par Given Ms. Rodrigez’ personal and current reported family history of cancer, and her negative genetic test results, long-term management and surveillance should be based on her on- or post-treatment protocol as recommended by her oncologist. In the absence of other indications, Ms. Rodrigez should practice age-appropriate cancer screening of other organ systems as recommended for the general population, we particularly discussed the importance of breast screening closer to age 40. \par \par PLAN:\par 1.	These results do not change the patient’s medical management. Long-term management and surveillance should be based on the patient’s on- or post-treatment protocol as recommended by their oncologist.\par 2.	Testing of family members based on this result is not indicated.\par 3.	The patient was encouraged to contact us every 2-3 years to check on any changes in interpretation of the VUS, or sooner if there are changes in her personal or family history of cancer.\par 4.	Once MSI and IHC results are available, we will contact Ms. Rodrigez to discuss and review if there are any changes in our recommendations at that time. \par \par For any additional questions please call Cancer Genetics at [phone]. \par \par \par Stephen Gomez, MS, [facility]\par Genetic Counselor, Cancer Genetics\par \par \par CC: \par Patient\par Dr. Jaylen Lopez

## 2021-12-27 ENCOUNTER — OUTPATIENT (OUTPATIENT)
Dept: OUTPATIENT SERVICES | Facility: HOSPITAL | Age: 31
LOS: 1 days | End: 2021-12-27

## 2021-12-27 DIAGNOSIS — Z98.890 OTHER SPECIFIED POSTPROCEDURAL STATES: Chronic | ICD-10-CM

## 2022-01-01 ENCOUNTER — NON-APPOINTMENT (OUTPATIENT)
Age: 32
End: 2022-01-01

## 2022-01-03 ENCOUNTER — NON-APPOINTMENT (OUTPATIENT)
Age: 32
End: 2022-01-03

## 2022-01-04 ENCOUNTER — APPOINTMENT (OUTPATIENT)
Dept: HEMATOLOGY ONCOLOGY | Facility: CLINIC | Age: 32
End: 2022-01-04
Payer: MEDICAID

## 2022-01-04 PROCEDURE — 90832 PSYTX W PT 30 MINUTES: CPT | Mod: 95

## 2022-01-10 ENCOUNTER — APPOINTMENT (OUTPATIENT)
Dept: HEMATOLOGY ONCOLOGY | Facility: CLINIC | Age: 32
End: 2022-01-10

## 2022-01-17 ENCOUNTER — OUTPATIENT (OUTPATIENT)
Dept: OUTPATIENT SERVICES | Facility: HOSPITAL | Age: 32
LOS: 1 days | End: 2022-01-17

## 2022-01-17 ENCOUNTER — APPOINTMENT (OUTPATIENT)
Dept: CT IMAGING | Facility: CLINIC | Age: 32
End: 2022-01-17
Payer: MEDICAID

## 2022-01-17 ENCOUNTER — RESULT REVIEW (OUTPATIENT)
Age: 32
End: 2022-01-17

## 2022-01-17 DIAGNOSIS — Z98.890 OTHER SPECIFIED POSTPROCEDURAL STATES: Chronic | ICD-10-CM

## 2022-01-17 PROCEDURE — 71260 CT THORAX DX C+: CPT | Mod: 26

## 2022-01-17 PROCEDURE — 74177 CT ABD & PELVIS W/CONTRAST: CPT | Mod: 26

## 2022-01-19 ENCOUNTER — NON-APPOINTMENT (OUTPATIENT)
Age: 32
End: 2022-01-19

## 2022-01-21 ENCOUNTER — OUTPATIENT (OUTPATIENT)
Dept: OUTPATIENT SERVICES | Facility: HOSPITAL | Age: 32
LOS: 1 days | Discharge: HOME | End: 2022-01-21

## 2022-01-21 DIAGNOSIS — Z98.890 OTHER SPECIFIED POSTPROCEDURAL STATES: Chronic | ICD-10-CM

## 2022-01-21 DIAGNOSIS — C56.9 MALIGNANT NEOPLASM OF UNSPECIFIED OVARY: ICD-10-CM

## 2022-01-21 NOTE — PHYSICAL EXAM
[Fully active, able to carry on all pre-disease performance without restriction] : Status 0 - Fully active, able to carry on all pre-disease performance without restriction [Normal] : affect appropriate [de-identified] : vertical abdominal incision site c/d/i, healing well. Nontender, no HSM

## 2022-01-21 NOTE — HISTORY OF PRESENT ILLNESS
[Disease: _____________________] : Disease: [unfilled] [AJCC Stage: ____] : AJCC Stage: [unfilled] [de-identified] : Referred by Dr. Magalys Fraire\par \par Ms. 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  \par \par Pt. states she developed abdominal pain about a year ago. She presented to GYN in [place] and was advised she had a cyst on each ovary. She was prescribed birth control and advised to follow up in 3 months. She followed up in 3 months and was advised that cysts had become larger but there was nothing to do at that time and that should she experience severe pain to follow up with ER because this could signify cyst rupture. Patient decided to stop birth control pills because she was experiencing migraines, which stopped after stopping meds. \par \par Patient states she did go to the  ER in [place] because of severe pain and workup was normal and was discharged. She then traveled to [facility] in February and presented to ED there for severe pain. She was advised she had endometriosis and advised to follow up outpatient. Her insurance did not cover her primary GYN and was then referred to Dr. Christensen. \par \par She was subsequently referred to Dr. Fraire for gyn onc evaluation and underwent RA-laparoscopic LSO and R ovarian cystectomy on 6/15/21, followed by exp lap, LINCOLN-RSO, omentectomy, and optimal tumor debulking surgery (7/6/21). \par \par Final pathology:\par Final Diagnosis\par 1. Uterus, cervix and right ovary and fallopian tube (total hysterectomy and salpingo-oophorectomy):\par - Focal low-grade micropapillary serous carcinoma (best seen on slide 1O) arising in a borderline tumor of the ovary.\par - The overall size of the ovarian tumor is 2.5 x 2.0 x 1.2 cm.\par - Involvement of the ovarian surface by borderline tumor is identified.\par - Small invasive implants involving the ovary (slides 1M-O), uterine serosa (slide 1G), and the fallopian tube.\par - Chronic cervicitis and squamous metaplasia.\par - Secretory endometrium.\par - Uterine serosal adhesion and tubo-ovarian adhesion.\par - Post-surgery changes of the ovary.\par - Benign paratubal cysts.\par \par 2. Infragastric omentum (omentectomy):\par - Multiple noninvasive implants.\par - One (1) omental lymph node, negative for tumor.\par - Focal endosalpingiosis.\par \par GYN Oncology Tumor Board Consensus on 7/12/21: Clinical Trial, BRCA testing\par Diagnosis: Stage IIIB low grade serous ovarian carcinoma.\par \par 8/11/2021: Started adjuvant carbo/taxol  [de-identified] : [employer] genetic panel: MSH6 [id number]>C (VUS) [FreeTextEntry1] : surgery (7/6/21)  --> Adjuvant Carbo/Taxol started 8/11/2021 s/p cycle 6/6 [de-identified] : Neli comes in for follow up 1 week after C6 inpatient of carbo/taxol via desensitization and developed mild reaction but was able to complete the cycle.  She states she noted loose diarrhea 4 to 5 times yesterday but without fevers.  She does have hot flashes alternating with chills do to her hormonal changes.  She states she intermittently feels dizzy/lightheaded, increased fatigued.  She states Monday, she spent mostly in bed due to joint pains but today is improved.  She states during chemotherapy infusion, she had severe L leg pain but it is much better now as well.  She denies chest pain, sob, abd pain, n/v/c, rash, dysuria, urgency, no vaginal bleeding/discharge.  She is emotional as her father's chemotherapy has been halted.  \par

## 2022-01-21 NOTE — ASSESSMENT
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 \par \par #Stage IIIC Low Grade Ovarian Serous Carcinoma     \par -s/p C5 carbo/taxol with reaction to carboplatin (back pain)- completed C5 infusion \par -Has appointment with allergist, Dr. Rizo tomorrow to assist with desensitization. Will require hospitalization \par -interim bloodwork today; blood work from last visit reviewed \par -discussed ways to manage side effects and toxicities associated with chemotherapy in detail, including fatigue, decreased appetite, nausea, myalgias and bowel regimen with supportive care and medications.\par -Continue f/u with psycho-oncology and palliative care\par -Reviewed results of genetic testing as per patient request and will refer to genetics to discuss results of genetic testing- referred last visit, will reach out to genetics to schedule appointment \par -Continue Pepcid BID for reflux/gastritis symptoms \par -RTC after next cycle, plan for hydration after next cycle of chemotherapy as patient felt dizzy and dehydrated after C5 \par     \par All of the patient's questions and concerns were addressed in full. She knows to call the office should any other issues arise prior to next visit. Patient seen and discussed with Dr. Lopez.\par \par Solomon Leyva MD \par Hematology/Oncology Fellow, PGY-5

## 2022-01-21 NOTE — REVIEW OF SYSTEMS
[Fatigue] : fatigue [Negative] : Allergic/Immunologic [Diarrhea] : diarrhea [Joint Pain] : joint pain [Anxiety] : anxiety [Depression] : depression [Hot Flashes] : hot flashes [Recent Change In Weight] : ~T no recent weight change [SOB on Exertion] : no shortness of breath during exertion [Difficulty Walking] : no difficulty walking [FreeTextEntry2] : Myalgias

## 2022-01-24 ENCOUNTER — OUTPATIENT (OUTPATIENT)
Dept: OUTPATIENT SERVICES | Facility: HOSPITAL | Age: 32
LOS: 1 days | Discharge: HOME | End: 2022-01-24

## 2022-01-24 DIAGNOSIS — F43.20 ADJUSTMENT DISORDER, UNSPECIFIED: ICD-10-CM

## 2022-01-24 DIAGNOSIS — F43.23 ADJUSTMENT DISORDER WITH MIXED ANXIETY AND DEPRESSED MOOD: ICD-10-CM

## 2022-01-24 DIAGNOSIS — Z98.890 OTHER SPECIFIED POSTPROCEDURAL STATES: Chronic | ICD-10-CM

## 2022-01-27 ENCOUNTER — RESULT REVIEW (OUTPATIENT)
Age: 32
End: 2022-01-27

## 2022-01-27 ENCOUNTER — APPOINTMENT (OUTPATIENT)
Dept: HEMATOLOGY ONCOLOGY | Facility: CLINIC | Age: 32
End: 2022-01-27
Payer: MEDICAID

## 2022-01-27 VITALS
OXYGEN SATURATION: 96 % | RESPIRATION RATE: 16 BRPM | DIASTOLIC BLOOD PRESSURE: 81 MMHG | WEIGHT: 200.82 LBS | TEMPERATURE: 98.2 F | HEIGHT: 61 IN | BODY MASS INDEX: 37.91 KG/M2 | HEART RATE: 103 BPM | SYSTOLIC BLOOD PRESSURE: 116 MMHG

## 2022-01-27 LAB
BASOPHILS # BLD AUTO: 0.03 K/UL — SIGNIFICANT CHANGE UP (ref 0–0.2)
BASOPHILS NFR BLD AUTO: 0.4 % — SIGNIFICANT CHANGE UP (ref 0–2)
EOSINOPHIL # BLD AUTO: 0.07 K/UL — SIGNIFICANT CHANGE UP (ref 0–0.5)
EOSINOPHIL NFR BLD AUTO: 0.9 % — SIGNIFICANT CHANGE UP (ref 0–6)
HCT VFR BLD CALC: 38.3 % — SIGNIFICANT CHANGE UP (ref 34.5–45)
HGB BLD-MCNC: 12 G/DL — SIGNIFICANT CHANGE UP (ref 11.5–15.5)
IMM GRANULOCYTES NFR BLD AUTO: 0.4 % — SIGNIFICANT CHANGE UP (ref 0–1.5)
LYMPHOCYTES # BLD AUTO: 3 K/UL — SIGNIFICANT CHANGE UP (ref 1–3.3)
LYMPHOCYTES # BLD AUTO: 40.7 % — SIGNIFICANT CHANGE UP (ref 13–44)
MCHC RBC-ENTMCNC: 29.6 PG — SIGNIFICANT CHANGE UP (ref 27–34)
MCHC RBC-ENTMCNC: 31.3 G/DL — LOW (ref 32–36)
MCV RBC AUTO: 94.3 FL — SIGNIFICANT CHANGE UP (ref 80–100)
MONOCYTES # BLD AUTO: 0.71 K/UL — SIGNIFICANT CHANGE UP (ref 0–0.9)
MONOCYTES NFR BLD AUTO: 9.6 % — SIGNIFICANT CHANGE UP (ref 2–14)
NEUTROPHILS # BLD AUTO: 3.54 K/UL — SIGNIFICANT CHANGE UP (ref 1.8–7.4)
NEUTROPHILS NFR BLD AUTO: 48 % — SIGNIFICANT CHANGE UP (ref 43–77)
NRBC # BLD: 0 /100 WBCS — SIGNIFICANT CHANGE UP (ref 0–0)
PLATELET # BLD AUTO: 267 K/UL — SIGNIFICANT CHANGE UP (ref 150–400)
RBC # BLD: 4.06 M/UL — SIGNIFICANT CHANGE UP (ref 3.8–5.2)
RBC # FLD: 13.6 % — SIGNIFICANT CHANGE UP (ref 10.3–14.5)
WBC # BLD: 7.38 K/UL — SIGNIFICANT CHANGE UP (ref 3.8–10.5)
WBC # FLD AUTO: 7.38 K/UL — SIGNIFICANT CHANGE UP (ref 3.8–10.5)

## 2022-01-27 PROCEDURE — 99215 OFFICE O/P EST HI 40 MIN: CPT

## 2022-01-28 LAB
ALBUMIN SERPL ELPH-MCNC: 4.7 G/DL
ALP BLD-CCNC: 67 U/L
ALT SERPL-CCNC: 32 U/L
ANION GAP SERPL CALC-SCNC: 13 MMOL/L
AST SERPL-CCNC: 23 U/L
BILIRUB SERPL-MCNC: 0.2 MG/DL
BUN SERPL-MCNC: 17 MG/DL
CALCIUM SERPL-MCNC: 10 MG/DL
CANCER AG125 SERPL-ACNC: 7 U/ML
CHLORIDE SERPL-SCNC: 102 MMOL/L
CO2 SERPL-SCNC: 25 MMOL/L
CREAT SERPL-MCNC: 0.88 MG/DL
GLUCOSE SERPL-MCNC: 86 MG/DL
MAGNESIUM SERPL-MCNC: 1.7 MG/DL
POTASSIUM SERPL-SCNC: 4.5 MMOL/L
PROT SERPL-MCNC: 8 G/DL
SODIUM SERPL-SCNC: 140 MMOL/L

## 2022-01-31 ENCOUNTER — APPOINTMENT (OUTPATIENT)
Dept: HEMATOLOGY ONCOLOGY | Facility: CLINIC | Age: 32
End: 2022-01-31
Payer: MEDICAID

## 2022-01-31 PROCEDURE — 90832 PSYTX W PT 30 MINUTES: CPT | Mod: 95

## 2022-02-02 ENCOUNTER — NON-APPOINTMENT (OUTPATIENT)
Age: 32
End: 2022-02-02

## 2022-03-01 ENCOUNTER — OUTPATIENT (OUTPATIENT)
Dept: OUTPATIENT SERVICES | Facility: HOSPITAL | Age: 32
LOS: 1 days | Discharge: HOME | End: 2022-03-01

## 2022-03-01 DIAGNOSIS — Z98.890 OTHER SPECIFIED POSTPROCEDURAL STATES: Chronic | ICD-10-CM

## 2022-03-01 DIAGNOSIS — C56.9 MALIGNANT NEOPLASM OF UNSPECIFIED OVARY: ICD-10-CM

## 2022-03-03 ENCOUNTER — APPOINTMENT (OUTPATIENT)
Dept: HEMATOLOGY ONCOLOGY | Facility: CLINIC | Age: 32
End: 2022-03-03

## 2022-03-07 NOTE — ADDENDUM
[FreeTextEntry1] : I, Cesar Leslie, acted solely as a scribe for Dr. Jaylen Lopez on 01/27/2022. All medical entries made by the Scribe were at my, Dr. Jaylen Lopez's, direction and personally dictated by me on 01/27/2022. I have reviewed the chart and agree that the record accurately reflects my personal performance of the history, physical exam, assessment and plan. I have also personally directed, reviewed, and agreed with the chart.

## 2022-03-07 NOTE — PHYSICAL EXAM
[Fully active, able to carry on all pre-disease performance without restriction] : Status 0 - Fully active, able to carry on all pre-disease performance without restriction [Normal] : affect appropriate [de-identified] : vertical abdominal incision site c/d/i, healing well. Nontender, no HSM

## 2022-03-07 NOTE — HISTORY OF PRESENT ILLNESS
[Disease: _____________________] : Disease: [unfilled] [AJCC Stage: ____] : AJCC Stage: [unfilled] [de-identified] : Referred by Dr. Magalys Fraire\par \par Ms. 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  \par \par Pt. states she developed abdominal pain about a year ago. She presented to GYN in [place] and was advised she had a cyst on each ovary. She was prescribed birth control and advised to follow up in 3 months. She followed up in 3 months and was advised that cysts had become larger but there was nothing to do at that time and that should she experience severe pain to follow up with ER because this could signify cyst rupture. Patient decided to stop birth control pills because she was experiencing migraines, which stopped after stopping meds. \par \par Patient states she did go to the  ER in [place] because of severe pain and workup was normal and was discharged. She then traveled to [facility] in February and presented to ED there for severe pain. She was advised she had endometriosis and advised to follow up outpatient. Her insurance did not cover her primary GYN and was then referred to Dr. Christensen. \par \par She was subsequently referred to Dr. Fraire for gyn onc evaluation and underwent RA-laparoscopic LSO and R ovarian cystectomy on 6/15/21, followed by exp lap, LINCOLN-RSO, omentectomy, and optimal tumor debulking surgery (7/6/21). \par \par Final pathology:\par Final Diagnosis\par 1. Uterus, cervix and right ovary and fallopian tube (total hysterectomy and salpingo-oophorectomy):\par - Focal low-grade micropapillary serous carcinoma (best seen on slide 1O) arising in a borderline tumor of the ovary.\par - The overall size of the ovarian tumor is 2.5 x 2.0 x 1.2 cm.\par - Involvement of the ovarian surface by borderline tumor is identified.\par - Small invasive implants involving the ovary (slides 1M-O), uterine serosa (slide 1G), and the fallopian tube.\par - Chronic cervicitis and squamous metaplasia.\par - Secretory endometrium.\par - Uterine serosal adhesion and tubo-ovarian adhesion.\par - Post-surgery changes of the ovary.\par - Benign paratubal cysts.\par \par 2. 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 Her last cycle of adjuvant chemotherapy was given as inpatient for carboplatin desensitization. She feels well overall. She does have some fatigue and general aches and pains. She denies fever, leg pain, leg swelling, chest pain, sob, abd pain, n/v/c, rash, dysuria, urgency, vaginal bleeding/discharge, neuropathy. Her feet are swollen and painful, worse at the end of the day. She tries to keep it elevated and wears comfortable shoes. Her appetite is good. She is slowly gaining weight. Her hair is growing back. \par \par She is emotional since she lost her father to lung cancer in December 2021 and it has been difficult coping with the stressors. \par She had the COVID vaccine x 2.

## 2022-03-07 NOTE — REVIEW OF SYSTEMS
[Fatigue] : fatigue [Negative] : Endocrine [Lower Ext Edema] : lower extremity edema [Recent Change In Weight] : ~T no recent weight change [SOB on Exertion] : no shortness of breath during exertion [Difficulty Walking] : no difficulty walking [FreeTextEntry5] : feet swelling [FreeTextEntry9] : foot pain

## 2022-03-07 NOTE — RESULTS/DATA
[FreeTextEntry1] : Imaging studies and pathology findings reviewed in detail.\par \par 1/17/22\par CT chest/abd/pelvis\par FINDINGS:\par \par Lungs/Pleura/Airways: Patent central airways. No suspicious pulmonary lesions or focal consolidation. No hematogenous pulmonary metastasis. Asymmetric dependent atelectatic changes left greater than right. Stable focus of pleural scarring involving the right minor fissure. No pleumothorax or pleural effusion.\par \par Mediastinum: Small focus of soft tissue density in the anterior mediastinum may represent thymic tissue, possible rebound thymic hyperplasia in the setting of recent chemotherapy. No lymphadenopathy. Interval decrease in the size of multiple right-sided pericardial lymph nodes measuring up to 4 mm in short axis (2:43-46).\par \par Chest wall/Lower neck: Within normal limits.\par \par Liver: Diffusely hypoattenuating parenchyma consistent with hepatic steatosis. Borderline hepatomegaly. No suspicious lesions.\par \par Gallbladder: Cholelithiasis without evidence of acute cholecystitis.\par \par Spleen: Within normal limits.\par \par Pancreas: Within normal limits.\par \par Adrenal glands: Within normal limits.\par \par Kidneys: Nonobstructing nephrolithiases measuring up to 5 mm in the left kidney. No right nephrolithiasis. No hydronephrosis. Subcentimeter hypodensity in the lower pole of the left kidney is unchanged compared to prior imaging, likely cyst.\par \par Lymph nodes: No lymphadenopathy. Compared to imaging from 6/24/2021, possible slight decrease in borderline right upper quadrant portacaval lymph nodes lymph measuring up to 11 mm in short axis (2:65).\par \par Peritoneum/omentum: No ascites. Compared to imaging from 6/24/2021, there has been interval decrease in the size of a mid upper abdomen omental nodule, now measuring 0.6 x 0.4 cm (2:84) (on prior 1.2 x 0.7 cm)\par \par Ascites: No ascites.\par \par GI tract and peritoneum: Normal morphology of the stomach and duodenum. No bowel obstruction. Normal appendix. Mild colonic diverticulosis.\par \par Vasculature: Within normal limits.\par \par Pelvic organs: Status post hysterectomy and bilateral salpingo-oophorectomy, as per history. Urinary bladder is within normal limits.\par \par Soft tissues: Within normal limits.\par \par Bones: Within normal limits.\par \par IMPRESSION:\par Since 6/24/2021, there is been interval decrease in the size of an upper abdominal omental nodule. Multiple stable top normal-sized portacaval lymph nodes No new suspicious lesions. Probable rebound hyperplasia of the thymus. Post interval hysterectomy and bilateral salpingo-oophorectomy concordant with provided history.\par

## 2022-03-16 ENCOUNTER — RESULT REVIEW (OUTPATIENT)
Age: 32
End: 2022-03-16

## 2022-03-16 ENCOUNTER — APPOINTMENT (OUTPATIENT)
Dept: HEMATOLOGY ONCOLOGY | Facility: CLINIC | Age: 32
End: 2022-03-16
Payer: MEDICAID

## 2022-03-16 ENCOUNTER — APPOINTMENT (OUTPATIENT)
Dept: GYNECOLOGIC ONCOLOGY | Facility: CLINIC | Age: 32
End: 2022-03-16
Payer: MEDICAID

## 2022-03-16 ENCOUNTER — APPOINTMENT (OUTPATIENT)
Dept: INFUSION THERAPY | Facility: HOSPITAL | Age: 32
End: 2022-03-16

## 2022-03-16 VITALS
BODY MASS INDEX: 37.76 KG/M2 | SYSTOLIC BLOOD PRESSURE: 124 MMHG | WEIGHT: 200 LBS | HEART RATE: 91 BPM | DIASTOLIC BLOOD PRESSURE: 82 MMHG | HEIGHT: 61 IN

## 2022-03-16 VITALS
HEART RATE: 83 BPM | BODY MASS INDEX: 38.5 KG/M2 | SYSTOLIC BLOOD PRESSURE: 119 MMHG | WEIGHT: 203.91 LBS | HEIGHT: 60.98 IN | RESPIRATION RATE: 16 BRPM | TEMPERATURE: 97.3 F | DIASTOLIC BLOOD PRESSURE: 84 MMHG | OXYGEN SATURATION: 98 %

## 2022-03-16 LAB
ALBUMIN SERPL ELPH-MCNC: 4.8 G/DL — SIGNIFICANT CHANGE UP (ref 3.3–5)
ALP SERPL-CCNC: 78 U/L — SIGNIFICANT CHANGE UP (ref 40–120)
ALT FLD-CCNC: 33 U/L — SIGNIFICANT CHANGE UP (ref 10–45)
ANION GAP SERPL CALC-SCNC: 14 MMOL/L — SIGNIFICANT CHANGE UP (ref 5–17)
AST SERPL-CCNC: 28 U/L — SIGNIFICANT CHANGE UP (ref 10–40)
BILIRUB SERPL-MCNC: 0.3 MG/DL — SIGNIFICANT CHANGE UP (ref 0.2–1.2)
BUN SERPL-MCNC: 10 MG/DL — SIGNIFICANT CHANGE UP (ref 7–23)
CALCIUM SERPL-MCNC: 9.9 MG/DL — SIGNIFICANT CHANGE UP (ref 8.4–10.5)
CHLORIDE SERPL-SCNC: 102 MMOL/L — SIGNIFICANT CHANGE UP (ref 96–108)
CO2 SERPL-SCNC: 26 MMOL/L — SIGNIFICANT CHANGE UP (ref 22–31)
CREAT SERPL-MCNC: 0.74 MG/DL — SIGNIFICANT CHANGE UP (ref 0.5–1.3)
EGFR: 111 ML/MIN/1.73M2 — SIGNIFICANT CHANGE UP
GLUCOSE SERPL-MCNC: 72 MG/DL — SIGNIFICANT CHANGE UP (ref 70–99)
HCT VFR BLD CALC: 36.5 % — SIGNIFICANT CHANGE UP (ref 34.5–45)
HGB BLD-MCNC: 12 G/DL — SIGNIFICANT CHANGE UP (ref 11.5–15.5)
MAGNESIUM SERPL-MCNC: 1.7 MG/DL — SIGNIFICANT CHANGE UP (ref 1.6–2.6)
MCHC RBC-ENTMCNC: 29.9 PG — SIGNIFICANT CHANGE UP (ref 27–34)
MCHC RBC-ENTMCNC: 32.9 G/DL — SIGNIFICANT CHANGE UP (ref 32–36)
MCV RBC AUTO: 91 FL — SIGNIFICANT CHANGE UP (ref 80–100)
PLATELET # BLD AUTO: 252 K/UL — SIGNIFICANT CHANGE UP (ref 150–400)
POTASSIUM SERPL-MCNC: 4.5 MMOL/L — SIGNIFICANT CHANGE UP (ref 3.5–5.3)
POTASSIUM SERPL-SCNC: 4.5 MMOL/L — SIGNIFICANT CHANGE UP (ref 3.5–5.3)
PROT SERPL-MCNC: 7.8 G/DL — SIGNIFICANT CHANGE UP (ref 6–8.3)
RBC # BLD: 4.01 M/UL — SIGNIFICANT CHANGE UP (ref 3.8–5.2)
RBC # FLD: 12.6 % — SIGNIFICANT CHANGE UP (ref 10.3–14.5)
SODIUM SERPL-SCNC: 141 MMOL/L — SIGNIFICANT CHANGE UP (ref 135–145)
WBC # BLD: 8.67 K/UL — SIGNIFICANT CHANGE UP (ref 3.8–10.5)
WBC # FLD AUTO: 8.67 K/UL — SIGNIFICANT CHANGE UP (ref 3.8–10.5)

## 2022-03-16 PROCEDURE — 99214 OFFICE O/P EST MOD 30 MIN: CPT

## 2022-03-16 RX ORDER — PROCHLORPERAZINE MALEATE 5 MG/1
TABLET ORAL
Refills: 0 | Status: DISCONTINUED | COMMUNITY
End: 2022-03-16

## 2022-03-16 RX ORDER — ALBUTEROL SULFATE 90 UG/1
108 (90 BASE) INHALANT RESPIRATORY (INHALATION)
Refills: 0 | Status: DISCONTINUED | COMMUNITY
End: 2022-03-16

## 2022-03-16 RX ORDER — LIDOCAINE AND PRILOCAINE 25; 25 MG/G; MG/G
2.5-2.5 CREAM TOPICAL
Qty: 1 | Refills: 6 | Status: DISCONTINUED | COMMUNITY
Start: 2021-08-11 | End: 2022-03-16

## 2022-03-16 RX ORDER — IBUPROFEN 800 MG/1
800 TABLET, FILM COATED ORAL
Qty: 90 | Refills: 0 | Status: DISCONTINUED | COMMUNITY
Start: 2021-02-14 | End: 2022-03-16

## 2022-03-16 RX ORDER — METOCLOPRAMIDE 10 MG/1
10 TABLET ORAL EVERY 6 HOURS
Qty: 30 | Refills: 3 | Status: DISCONTINUED | COMMUNITY
Start: 2021-10-28 | End: 2022-03-16

## 2022-03-16 RX ORDER — FAMOTIDINE 20 MG/1
20 TABLET, FILM COATED ORAL DAILY
Qty: 30 | Refills: 1 | Status: DISCONTINUED | COMMUNITY
Start: 2021-09-27 | End: 2022-03-16

## 2022-03-17 LAB — CANCER AG125 SERPL-ACNC: 7 U/ML — SIGNIFICANT CHANGE UP

## 2022-04-30 NOTE — HISTORY OF PRESENT ILLNESS
[Disease: _____________________] : Disease: [unfilled] [AJCC Stage: ____] : AJCC Stage: [unfilled] [de-identified] : Referred by Dr. Magalys Fraire\par \par Ms. 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  \par \par Pt. states she developed abdominal pain about a year ago. She presented to GYN in [place] and was advised she had a cyst on each ovary. She was prescribed birth control and advised to follow up in 3 months. She followed up in 3 months and was advised that cysts had become larger but there was nothing to do at that time and that should she experience severe pain to follow up with ER because this could signify cyst rupture. Patient decided to stop birth control pills because she was experiencing migraines, which stopped after stopping meds. \par \par Patient states she did go to the  ER in [place] because of severe pain and workup was normal and was discharged. She then traveled to [facility] in February and presented to ED there for severe pain. She was advised she had endometriosis and advised to follow up outpatient. Her insurance did not cover her primary GYN and was then referred to Dr. Christensen. \par \par She was subsequently referred to Dr. Fraire for gyn onc evaluation and underwent RA-laparoscopic LSO and R ovarian cystectomy on 6/15/21, followed by exp lap, LINCOLN-RSO, omentectomy, and optimal tumor debulking surgery (7/6/21). \par \par Final pathology:\par Final Diagnosis\par 1. Uterus, cervix and right ovary and fallopian tube (total hysterectomy and salpingo-oophorectomy):\par - Focal low-grade micropapillary serous carcinoma (best seen on slide 1O) arising in a borderline tumor of the ovary.\par - The overall size of the ovarian tumor is 2.5 x 2.0 x 1.2 cm.\par - Involvement of the ovarian surface by borderline tumor is identified.\par - Small invasive implants involving the ovary (slides 1M-O), uterine serosa (slide 1G), and the fallopian tube.\par - Chronic cervicitis and squamous metaplasia.\par - Secretory endometrium.\par - Uterine serosal adhesion and tubo-ovarian adhesion.\par - Post-surgery changes of the ovary.\par - Benign paratubal cysts.\par \par 2. Infragastric omentum (omentectomy):\par - Multiple noninvasive implants.\par - One (1) omental lymph node, negative for tumor.\par - Focal endosalpingiosis.\par \par GYN Oncology Tumor Board Consensus on 7/12/21: Clinical Trial, BRCA testing\par Diagnosis: Stage IIIB low grade serous ovarian carcinoma.\par \par 8/11/2021: Started adjuvant carbo/taxol  [de-identified] : [employer] genetic panel: MSH6 [id number]>C (VUS) [FreeTextEntry1] : surgery (7/6/21)  --> Adjuvant Carbo/Taxol started 8/11/2021 s/p cycle 6/6 [de-identified] : Neli comes in for scheduled follow up. She feels well overall although she is grieving about her father's recent death. She has been recovering from the side effects of chemotherapy and feeling better overall. She denies fevers, chills, n/v, abdominal pain, neuropathy, vaginal discharge or bleeding, urinary symptoms, cough, CP, SOB.\par \par \par

## 2022-04-30 NOTE — REVIEW OF SYSTEMS
[Fatigue] : fatigue [Lower Ext Edema] : lower extremity edema [Negative] : Allergic/Immunologic [Recent Change In Weight] : ~T no recent weight change [SOB on Exertion] : no shortness of breath during exertion [Difficulty Walking] : no difficulty walking [FreeTextEntry5] : feet swelling [FreeTextEntry9] : foot pain

## 2022-04-30 NOTE — PHYSICAL EXAM
[Fully active, able to carry on all pre-disease performance without restriction] : Status 0 - Fully active, able to carry on all pre-disease performance without restriction [Normal] : affect appropriate [de-identified] : vertical abdominal incision site c/d/i, healing well. Nontender, no HSM

## 2022-05-04 ENCOUNTER — APPOINTMENT (OUTPATIENT)
Dept: CT IMAGING | Facility: CLINIC | Age: 32
End: 2022-05-04
Payer: MEDICAID

## 2022-05-04 ENCOUNTER — RESULT REVIEW (OUTPATIENT)
Age: 32
End: 2022-05-04

## 2022-05-04 ENCOUNTER — OUTPATIENT (OUTPATIENT)
Dept: OUTPATIENT SERVICES | Facility: HOSPITAL | Age: 32
LOS: 1 days | End: 2022-05-04

## 2022-05-04 DIAGNOSIS — Z98.890 OTHER SPECIFIED POSTPROCEDURAL STATES: Chronic | ICD-10-CM

## 2022-05-04 PROCEDURE — 74177 CT ABD & PELVIS W/CONTRAST: CPT | Mod: 26

## 2022-05-06 ENCOUNTER — OUTPATIENT (OUTPATIENT)
Dept: OUTPATIENT SERVICES | Facility: HOSPITAL | Age: 32
LOS: 1 days | Discharge: HOME | End: 2022-05-06

## 2022-05-06 DIAGNOSIS — Z98.890 OTHER SPECIFIED POSTPROCEDURAL STATES: Chronic | ICD-10-CM

## 2022-05-06 DIAGNOSIS — C56.9 MALIGNANT NEOPLASM OF UNSPECIFIED OVARY: ICD-10-CM

## 2022-05-11 ENCOUNTER — APPOINTMENT (OUTPATIENT)
Dept: HEMATOLOGY ONCOLOGY | Facility: CLINIC | Age: 32
End: 2022-05-11
Payer: MEDICAID

## 2022-05-11 DIAGNOSIS — Z51.5 ENCOUNTER FOR PALLIATIVE CARE: ICD-10-CM

## 2022-05-11 DIAGNOSIS — G62.9 POLYNEUROPATHY, UNSPECIFIED: ICD-10-CM

## 2022-05-11 DIAGNOSIS — G47.9 SLEEP DISORDER, UNSPECIFIED: ICD-10-CM

## 2022-05-11 PROCEDURE — 99214 OFFICE O/P EST MOD 30 MIN: CPT | Mod: 95

## 2022-05-11 NOTE — REASON FOR VISIT
[Follow-Up] : a follow-up visit [Home] : at home, [unfilled] , at the time of the visit. [Medical Office: ([facility])___] : at the medical office located in  [Verbal consent obtained from patient] : the patient, [unfilled]

## 2022-05-16 PROBLEM — Z51.5 ENCOUNTER FOR PALLIATIVE CARE: Status: ACTIVE | Noted: 2021-09-28

## 2022-05-16 PROBLEM — G62.9 NEUROPATHY: Status: ACTIVE | Noted: 2022-05-16

## 2022-05-16 PROBLEM — G47.9 TROUBLE IN SLEEPING: Status: ACTIVE | Noted: 2022-05-16

## 2022-05-16 NOTE — HISTORY OF PRESENT ILLNESS
[FreeTextEntry1] : 32yoF with Stage IIIC serous ovarian cancer presents for follow-up palliative care visit, referred by Oncology team.  PMH also significant for asthma. \par \par Patient is s/p robotic assisted laparoscopic left salpingo-oophorectomy, right ovarian cystectomy, pelvic washings and tumor debulking on 6/15/21.\par \par She is receiving adjuvant Carbo/Taxol, is s/p 3 cycles to date. \par She feels a multitude of symptoms that start about 2-3 days after receiving chemotherapy.  Is interested in incorporating medicinal cannabis into her regimen for symptom control.\par \par Interval History: \par Patient completed adjuvant Carbo/Taxol on 8/11/2021.\par CT scans from January 2022 demonstrated interval decrease in the size of upper abdominal omental nodule and stable normal-sized portacaval lymph nodes. She was started on Letrozole. \par \par Patient presents today to discuss management of bilateral LE neuropathy which had an onset after chemotherapy. She was started on Gabapentin 200mg BID but ran out recently. She is interested in medical cannabis for neuropathy\par \par ROS:\par +arthralgias - mainly knees\par +trouble sleeping - she obtained cannabis gummies from her brother in [place] - this has helped.\par +hot flashes\par +mood has been OK, she's following with psycho-oncology service here at [facility] All other ROS as outlined or noncontributory. \par \par Patient is single, lives with her parents and her two brothers. Sometimes stays with her boyfriend. Her brother drives her to appointments, has not been driving lately. 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 She is Muslim. \par \par PMD: Dr. Shin Garcia \[facility] \[facility] Ref#:  [id number]

## 2022-05-16 NOTE — ASSESSMENT
[FreeTextEntry1] : 32yoF with:\par \par 1. Serous Ovarian Carcinoma - s/p LINCOLN BSO and adjuvant Carbo/Taxol. On Letrozole. Follow up with Med Onc. \par \par 2. Neuropathy - Discussed medical cannabis for neuropathy.\par - May also benefit from hemp-derived CBD. \par - Will increase Gabapentin to 300mg TID, as tolerated.\par - May benefit from acupuncture. \par \par 3. Trouble sleeping - Medical cannabis may be of benefit.\par \par 4.  HCM - Patient is in need of PMD.  \par \par 5. Encounter for Palliative Care - Emotional support provided.\par \par Follow up PRN, call with questions or issues.

## 2022-05-16 NOTE — DATA REVIEWED
[FreeTextEntry1] : CT A/P (5/4/2022):\par \par LOWER CHEST: Evaluation of the lower chest demonstrates normal heart size. Lower lung parenchyma is unremarkable. No pleural and no pericardial effusion.\par \par UPPER ABDOMEN: Evaluation of the abdomen demonstrates hepatic steatosis. The spleen, pancreas, gallbladder, bilateral adrenal glands and bilateral kidneys are normal in appearance with several nonobstructing bilateral intrarenal calculi the largest measuring 4 mm in the lower pole of the left kidney. Small right renal cyst.\par \par GASTROINTESTINAL TRACT: Evaluation of the gastrointestinal tract demonstrates no bowel thickening. No bowel obstruction. Normal appearance of the appendix.\par \par PELVIS: Evaluation of the pelvis demonstrates hysterectomy and oophorectomy. Large volume of stool within large bowel. Bladder is unremarkable. There is a stable nodule within the greater omentum measuring 6 mm, previously measuring 12 mm in 1/2022 which has the morphological appearance of a lymph node.\par \par PERITONEUM / RETROPERITONEUM / NODES: There is no free fluid. There is no adenopathy. Negative for upper abdominal, retroperitoneal or pelvic adenopathy. Negative for nodularity of the peritoneal reflections. No damien soft tissue omental nodule\par \par ARTERIAL VASCULATURE: There is negative for arterial vascular calcification.\par \par VENOUS VASCULATURE: The opacified aspects of the hepatic, portal, splenic, superior mesenteric vein, bilateral renal veins, IVC and bilateral iliac veins demonstrates no damien intraluminal thrombus.\par \par LOWER CHEST / ABDOMINAL WALL: Trace fat-containing periumbilical hernia.\par \par BONES: No destructive osseous lesion.\par \par IMPRESSION:\par \par Interval decrease in size of a small soft tissue nodule, possibly a lymph node, in the epigastric region.\par \par Hysterectomy and oophorectomy.\par \par No CT evidence of recurrent or metastatic disease.

## 2022-05-19 ENCOUNTER — RESULT REVIEW (OUTPATIENT)
Age: 32
End: 2022-05-19

## 2022-05-19 ENCOUNTER — APPOINTMENT (OUTPATIENT)
Dept: HEMATOLOGY ONCOLOGY | Facility: CLINIC | Age: 32
End: 2022-05-19
Payer: MEDICAID

## 2022-05-19 ENCOUNTER — APPOINTMENT (OUTPATIENT)
Dept: INFUSION THERAPY | Facility: HOSPITAL | Age: 32
End: 2022-05-19

## 2022-05-19 VITALS
RESPIRATION RATE: 16 BRPM | HEART RATE: 90 BPM | BODY MASS INDEX: 39.96 KG/M2 | SYSTOLIC BLOOD PRESSURE: 114 MMHG | OXYGEN SATURATION: 99 % | DIASTOLIC BLOOD PRESSURE: 72 MMHG | TEMPERATURE: 97.3 F | HEIGHT: 61.02 IN | WEIGHT: 211.64 LBS

## 2022-05-19 LAB
ALBUMIN SERPL ELPH-MCNC: 4.7 G/DL — SIGNIFICANT CHANGE UP (ref 3.3–5)
ALP SERPL-CCNC: 93 U/L — SIGNIFICANT CHANGE UP (ref 40–120)
ALT FLD-CCNC: 32 U/L — SIGNIFICANT CHANGE UP (ref 10–45)
ANION GAP SERPL CALC-SCNC: 16 MMOL/L — SIGNIFICANT CHANGE UP (ref 5–17)
AST SERPL-CCNC: 33 U/L — SIGNIFICANT CHANGE UP (ref 10–40)
BASOPHILS # BLD AUTO: 0.05 K/UL — SIGNIFICANT CHANGE UP (ref 0–0.2)
BASOPHILS NFR BLD AUTO: 0.6 % — SIGNIFICANT CHANGE UP (ref 0–2)
BILIRUB SERPL-MCNC: 0.2 MG/DL — SIGNIFICANT CHANGE UP (ref 0.2–1.2)
BUN SERPL-MCNC: 11 MG/DL — SIGNIFICANT CHANGE UP (ref 7–23)
CALCIUM SERPL-MCNC: 10.3 MG/DL — SIGNIFICANT CHANGE UP (ref 8.4–10.5)
CANCER AG125 SERPL-ACNC: 7 U/ML — SIGNIFICANT CHANGE UP
CHLORIDE SERPL-SCNC: 101 MMOL/L — SIGNIFICANT CHANGE UP (ref 96–108)
CO2 SERPL-SCNC: 23 MMOL/L — SIGNIFICANT CHANGE UP (ref 22–31)
CREAT SERPL-MCNC: 0.74 MG/DL — SIGNIFICANT CHANGE UP (ref 0.5–1.3)
EGFR: 111 ML/MIN/1.73M2 — SIGNIFICANT CHANGE UP
EOSINOPHIL # BLD AUTO: 0.12 K/UL — SIGNIFICANT CHANGE UP (ref 0–0.5)
EOSINOPHIL NFR BLD AUTO: 1.4 % — SIGNIFICANT CHANGE UP (ref 0–6)
GLUCOSE SERPL-MCNC: 74 MG/DL — SIGNIFICANT CHANGE UP (ref 70–99)
HCT VFR BLD CALC: 37.1 % — SIGNIFICANT CHANGE UP (ref 34.5–45)
HGB BLD-MCNC: 11.8 G/DL — SIGNIFICANT CHANGE UP (ref 11.5–15.5)
IMM GRANULOCYTES NFR BLD AUTO: 0.2 % — SIGNIFICANT CHANGE UP (ref 0–1.5)
LYMPHOCYTES # BLD AUTO: 3.26 K/UL — SIGNIFICANT CHANGE UP (ref 1–3.3)
LYMPHOCYTES # BLD AUTO: 37.7 % — SIGNIFICANT CHANGE UP (ref 13–44)
MAGNESIUM SERPL-MCNC: 1.7 MG/DL — SIGNIFICANT CHANGE UP (ref 1.6–2.6)
MCHC RBC-ENTMCNC: 28.4 PG — SIGNIFICANT CHANGE UP (ref 27–34)
MCHC RBC-ENTMCNC: 31.8 G/DL — LOW (ref 32–36)
MCV RBC AUTO: 89.2 FL — SIGNIFICANT CHANGE UP (ref 80–100)
MONOCYTES # BLD AUTO: 0.62 K/UL — SIGNIFICANT CHANGE UP (ref 0–0.9)
MONOCYTES NFR BLD AUTO: 7.2 % — SIGNIFICANT CHANGE UP (ref 2–14)
NEUTROPHILS # BLD AUTO: 4.57 K/UL — SIGNIFICANT CHANGE UP (ref 1.8–7.4)
NEUTROPHILS NFR BLD AUTO: 52.9 % — SIGNIFICANT CHANGE UP (ref 43–77)
NRBC # BLD: 0 /100 WBCS — SIGNIFICANT CHANGE UP (ref 0–0)
PLATELET # BLD AUTO: 259 K/UL — SIGNIFICANT CHANGE UP (ref 150–400)
POTASSIUM SERPL-MCNC: 4.4 MMOL/L — SIGNIFICANT CHANGE UP (ref 3.5–5.3)
POTASSIUM SERPL-SCNC: 4.4 MMOL/L — SIGNIFICANT CHANGE UP (ref 3.5–5.3)
PROT SERPL-MCNC: 7.9 G/DL — SIGNIFICANT CHANGE UP (ref 6–8.3)
RBC # BLD: 4.16 M/UL — SIGNIFICANT CHANGE UP (ref 3.8–5.2)
RBC # FLD: 12.9 % — SIGNIFICANT CHANGE UP (ref 10.3–14.5)
SODIUM SERPL-SCNC: 140 MMOL/L — SIGNIFICANT CHANGE UP (ref 135–145)
WBC # BLD: 8.64 K/UL — SIGNIFICANT CHANGE UP (ref 3.8–10.5)
WBC # FLD AUTO: 8.64 K/UL — SIGNIFICANT CHANGE UP (ref 3.8–10.5)

## 2022-05-19 PROCEDURE — 99214 OFFICE O/P EST MOD 30 MIN: CPT

## 2022-05-21 ENCOUNTER — OUTPATIENT (OUTPATIENT)
Dept: OUTPATIENT SERVICES | Facility: HOSPITAL | Age: 32
LOS: 1 days | Discharge: HOME | End: 2022-05-21

## 2022-05-21 DIAGNOSIS — F43.23 ADJUSTMENT DISORDER WITH MIXED ANXIETY AND DEPRESSED MOOD: ICD-10-CM

## 2022-05-21 DIAGNOSIS — Z98.890 OTHER SPECIFIED POSTPROCEDURAL STATES: Chronic | ICD-10-CM

## 2022-05-24 ENCOUNTER — APPOINTMENT (OUTPATIENT)
Dept: HEMATOLOGY ONCOLOGY | Facility: CLINIC | Age: 32
End: 2022-05-24
Payer: MEDICAID

## 2022-05-24 PROCEDURE — 90834 PSYTX W PT 45 MINUTES: CPT | Mod: 95

## 2022-05-26 ENCOUNTER — APPOINTMENT (OUTPATIENT)
Dept: DERMATOLOGY | Facility: CLINIC | Age: 32
End: 2022-05-26
Payer: MEDICAID

## 2022-05-26 ENCOUNTER — NON-APPOINTMENT (OUTPATIENT)
Age: 32
End: 2022-05-26

## 2022-05-26 DIAGNOSIS — Z12.83 ENCOUNTER FOR SCREENING FOR MALIGNANT NEOPLASM OF SKIN: ICD-10-CM

## 2022-05-26 DIAGNOSIS — L82.1 OTHER SEBORRHEIC KERATOSIS: ICD-10-CM

## 2022-05-26 PROCEDURE — 99204 OFFICE O/P NEW MOD 45 MIN: CPT

## 2022-05-26 RX ORDER — HYDROCORTISONE 25 MG/G
2.5 CREAM TOPICAL
Qty: 1 | Refills: 2 | Status: ACTIVE | COMMUNITY
Start: 2022-05-26 | End: 1900-01-01

## 2022-05-26 NOTE — PHYSICAL EXAM
[Alert] : alert [Oriented x 3] : ~L oriented x 3 [Well Nourished] : well nourished [Conjunctiva Non-injected] : conjunctiva non-injected [No Visual Lymphadenopathy] : no visual  lymphadenopathy [No Clubbing] : no clubbing [No Edema] : no edema [No Bromhidrosis] : no bromhidrosis [No Chromhidrosis] : no chromhidrosis [Full Body Skin Exam Performed] : performed [FreeTextEntry3] : cluster of stuck on brown papules with horn cysts on back\par scalp with regrowing hair and thin brown plaque with scar like areas\par no lesions noted under breasts

## 2022-05-26 NOTE — HISTORY OF PRESENT ILLNESS
[FreeTextEntry1] : mole on back  [de-identified] : new patient recently tx for ovarian cancer\par s/p surgery and finished chemotherapy dec 2021, currently on letrozole, no xrt\par growing mole on back\par no PMH or FH skin cancer\par \par sore on scalp opened up during chemo then healed \par occasional rash under breasts

## 2022-05-27 ENCOUNTER — APPOINTMENT (OUTPATIENT)
Dept: HEART AND VASCULAR | Facility: CLINIC | Age: 32
End: 2022-05-27
Payer: MEDICAID

## 2022-05-27 VITALS
TEMPERATURE: 98 F | HEART RATE: 102 BPM | DIASTOLIC BLOOD PRESSURE: 90 MMHG | OXYGEN SATURATION: 91 % | BODY MASS INDEX: 28.84 KG/M2 | WEIGHT: 206 LBS | RESPIRATION RATE: 15 BRPM | SYSTOLIC BLOOD PRESSURE: 122 MMHG | HEIGHT: 71.02 IN

## 2022-05-27 PROCEDURE — 99203 OFFICE O/P NEW LOW 30 MIN: CPT

## 2022-05-27 PROCEDURE — 93000 ELECTROCARDIOGRAM COMPLETE: CPT

## 2022-05-27 NOTE — RESULTS/DATA
[FreeTextEntry1] : Imaging studies and pathology findings reviewed in detail.\par \par 5/4/22 CT abd/pelvis \par \par FINDINGS: CT of the abdomen and pelvis was performed with the administration of intravenous contrast. Reconstructions were performed in the sagittal and coronal planes. Comparison is made to prior study dated 5/12/2021, 6/24/2021, 1/17/2022.\par \par LOWER CHEST: Evaluation of the lower chest demonstrates normal heart size. Lower lung parenchyma is unremarkable. No pleural and no pericardial effusion.\par \par UPPER ABDOMEN: Evaluation of the abdomen demonstrates hepatic steatosis. The spleen, pancreas, gallbladder, bilateral adrenal glands and bilateral kidneys are normal in appearance with several nonobstructing bilateral intrarenal calculi the largest measuring 4 mm in the lower pole of the left kidney. Small right renal cyst.\par \par GASTROINTESTINAL TRACT: Evaluation of the gastrointestinal tract demonstrates no bowel thickening. No bowel obstruction. Normal appearance of the appendix.\par \par PELVIS: Evaluation of the pelvis demonstrates hysterectomy and oophorectomy. Large volume of stool within large bowel. Bladder is unremarkable. There is a stable nodule within the greater omentum measuring 6 mm, previously measuring 12 mm in 1/2022 which has the morphological appearance of a lymph node.\par \par PERITONEUM / RETROPERITONEUM / NODES: There is no free fluid. There is no adenopathy. Negative for upper abdominal, retroperitoneal or pelvic adenopathy. Negative for nodularity of the peritoneal reflections. No damien soft tissue omental nodule\par \par ARTERIAL VASCULATURE: There is negative for arterial vascular calcification.\par \par VENOUS VASCULATURE: The opacified aspects of the hepatic, portal, splenic, superior mesenteric vein, bilateral renal veins, IVC and bilateral iliac veins demonstrates no damien intraluminal thrombus.\par \par LOWER CHEST / ABDOMINAL WALL: Trace fat-containing periumbilical hernia.\par \par BONES: No destructive osseous lesion.\par \par \par \par \par \par \par IMPRESSION:\par \par Interval decrease in size of a small soft tissue nodule, possibly a lymph node, in the epigastric region.\par \par Hysterectomy and oophorectomy.\par \par No CT evidence of recurrent or metastatic disease.\par \par \par

## 2022-05-27 NOTE — HISTORY OF PRESENT ILLNESS
[Disease: _____________________] : Disease: [unfilled] [AJCC Stage: ____] : AJCC Stage: [unfilled] [de-identified] : Referred by Dr. Magalys Fraire\par \par Ms. 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  \par \par Pt. states she developed abdominal pain about a year ago. She presented to GYN in [place] and was advised she had a cyst on each ovary. She was prescribed birth control and advised to follow up in 3 months. She followed up in 3 months and was advised that cysts had become larger but there was nothing to do at that time and that should she experience severe pain to follow up with ER because this could signify cyst rupture. Patient decided to stop birth control pills because she was experiencing migraines, which stopped after stopping meds. \par \par Patient states she did go to the  ER in [place] because of severe pain and workup was normal and was discharged. She then traveled to [facility] in February and presented to ED there for severe pain. She was advised she had endometriosis and advised to follow up outpatient. Her insurance did not cover her primary GYN and was then referred to Dr. Christensen. \par \par She was subsequently referred to Dr. Fraire for gyn onc evaluation and underwent RA-laparoscopic LSO and R ovarian cystectomy on 6/15/21, followed by exp lap, LINCOLN-RSO, omentectomy, and optimal tumor debulking surgery (7/6/21). \par \par Final pathology:\par Final Diagnosis\par 1. Uterus, cervix and right ovary and fallopian tube (total hysterectomy and salpingo-oophorectomy):\par - Focal low-grade micropapillary serous carcinoma (best seen on slide 1O) arising in a borderline tumor of the ovary.\par - The overall size of the ovarian tumor is 2.5 x 2.0 x 1.2 cm.\par - Involvement of the ovarian surface by borderline tumor is identified.\par - Small invasive implants involving the ovary (slides 1M-O), uterine serosa (slide 1G), and the fallopian tube.\par - Chronic cervicitis and squamous metaplasia.\par - Secretory endometrium.\par - Uterine serosal adhesion and tubo-ovarian adhesion.\par - Post-surgery changes of the ovary.\par - Benign paratubal cysts.\par \par 2. Infragastric omentum (omentectomy):\par - Multiple noninvasive implants.\par - One (1) omental lymph node, negative for tumor.\par - Focal endosalpingiosis.\par \par GYN Oncology Tumor Board Consensus on 7/12/21: Clinical Trial, BRCA testing\par Diagnosis: Stage IIIB low grade serous ovarian carcinoma.\par \par 8/11/2021: Started adjuvant carbo/taxol  [de-identified] : [employer] genetic panel: MSH6 [id number]>C (VUS) [FreeTextEntry1] : surgery (7/6/21)  --> Adjuvant Carbo/Taxol started 8/11/2021 s/p cycle 6/6 [de-identified] : Neli comes in for scheduled follow up. She continues on letrozole, which she is tolerating without significant side effects. She feels well overall although she is still having a difficult time emotionally, grieving about her father's recent death. She has stable but residual neuropathy of her feet since chemotherapy, and has been seeing palliative care for medical cannabis and continues on gabapentin. In the interim she underwent CT scans that showed stable findings. \par \par She denies fevers, chills, n/v, abdominal pain, neuropathy, vaginal discharge or bleeding, urinary symptoms, cough, CP, SOB.\par \par \par

## 2022-05-27 NOTE — PHYSICAL EXAM
[Fully active, able to carry on all pre-disease performance without restriction] : Status 0 - Fully active, able to carry on all pre-disease performance without restriction [Normal] : affect appropriate [de-identified] : vertical abdominal incision site c/d/i, healing well. Nontender, no HSM

## 2022-05-30 ENCOUNTER — NON-APPOINTMENT (OUTPATIENT)
Age: 32
End: 2022-05-30

## 2022-06-08 ENCOUNTER — APPOINTMENT (OUTPATIENT)
Dept: HEMATOLOGY ONCOLOGY | Facility: CLINIC | Age: 32
End: 2022-06-08
Payer: MEDICAID

## 2022-06-08 PROCEDURE — 90834 PSYTX W PT 45 MINUTES: CPT | Mod: 95

## 2022-06-10 ENCOUNTER — APPOINTMENT (OUTPATIENT)
Dept: HEART AND VASCULAR | Facility: CLINIC | Age: 32
End: 2022-06-10
Payer: MEDICAID

## 2022-06-10 VITALS
HEART RATE: 104 BPM | SYSTOLIC BLOOD PRESSURE: 111 MMHG | DIASTOLIC BLOOD PRESSURE: 71 MMHG | OXYGEN SATURATION: 98 % | BODY MASS INDEX: 29.4 KG/M2 | HEIGHT: 71 IN | TEMPERATURE: 97.9 F | WEIGHT: 209.99 LBS

## 2022-06-10 DIAGNOSIS — R06.02 SHORTNESS OF BREATH: ICD-10-CM

## 2022-06-10 PROCEDURE — 99214 OFFICE O/P EST MOD 30 MIN: CPT | Mod: 25

## 2022-06-10 PROCEDURE — 93015 CV STRESS TEST SUPVJ I&R: CPT

## 2022-06-10 PROCEDURE — 93306 TTE W/DOPPLER COMPLETE: CPT

## 2022-06-10 NOTE — DISCUSSION/SUMMARY
[FreeTextEntry1] : sob- negative non invasive cardiac evaluation, results discussed/ reassurance given

## 2022-06-23 ENCOUNTER — APPOINTMENT (OUTPATIENT)
Dept: DERMATOLOGY | Facility: CLINIC | Age: 32
End: 2022-06-23
Payer: MEDICAID

## 2022-06-23 ENCOUNTER — LABORATORY RESULT (OUTPATIENT)
Age: 32
End: 2022-06-23

## 2022-06-23 DIAGNOSIS — D48.5 NEOPLASM OF UNCERTAIN BEHAVIOR OF SKIN: ICD-10-CM

## 2022-06-23 PROCEDURE — 11102 TANGNTL BX SKIN SINGLE LES: CPT

## 2022-06-23 PROCEDURE — 99214 OFFICE O/P EST MOD 30 MIN: CPT | Mod: 25

## 2022-06-23 NOTE — HISTORY OF PRESENT ILLNESS
[FreeTextEntry1] : FU scalp [de-identified] : estab hx ovarian ca s/p carbo/pac here for scalp biopsy\par also to check toenails without nail polish\par noting increased dark spot L great toenail since last pedicure, started as smaller spot 2 weeks prior

## 2022-06-23 NOTE — PHYSICAL EXAM
[Alert] : alert [Oriented x 3] : ~L oriented x 3 [Well Nourished] : well nourished [Conjunctiva Non-injected] : conjunctiva non-injected [No Visual Lymphadenopathy] : no visual  lymphadenopathy [No Clubbing] : no clubbing [No Edema] : no edema [No Bromhidrosis] : no bromhidrosis [No Chromhidrosis] : no chromhidrosis [FreeTextEntry3] : \par scalp with regrowing hair and thin brown plaque with scar like areas\par bilateral great toenails wth reddish brown macules L>R with curvature parallel to lunula but black areas adjacent to prox nail fold, no zavaleta sign

## 2022-06-29 ENCOUNTER — APPOINTMENT (OUTPATIENT)
Dept: GYNECOLOGIC ONCOLOGY | Facility: CLINIC | Age: 32
End: 2022-06-29

## 2022-06-29 VITALS — BODY MASS INDEX: 40.4 KG/M2 | HEIGHT: 61 IN | HEART RATE: 99 BPM | WEIGHT: 214 LBS

## 2022-06-29 DIAGNOSIS — L28.0 LICHEN SIMPLEX CHRONICUS: ICD-10-CM

## 2022-06-29 PROCEDURE — 99214 OFFICE O/P EST MOD 30 MIN: CPT

## 2022-06-29 RX ORDER — CLOBETASOL PROPIONATE 0.5 MG/G
0.05 OINTMENT TOPICAL
Qty: 1 | Refills: 3 | Status: ACTIVE | COMMUNITY
Start: 2022-06-29 | End: 1900-01-01

## 2022-07-06 ENCOUNTER — APPOINTMENT (OUTPATIENT)
Dept: HEMATOLOGY ONCOLOGY | Facility: CLINIC | Age: 32
End: 2022-07-06

## 2022-07-06 PROCEDURE — 90834 PSYTX W PT 45 MINUTES: CPT | Mod: 95

## 2022-07-11 ENCOUNTER — RX RENEWAL (OUTPATIENT)
Age: 32
End: 2022-07-11

## 2022-07-11 ENCOUNTER — NON-APPOINTMENT (OUTPATIENT)
Age: 32
End: 2022-07-11

## 2022-07-12 ENCOUNTER — LABORATORY RESULT (OUTPATIENT)
Age: 32
End: 2022-07-12

## 2022-07-12 ENCOUNTER — APPOINTMENT (OUTPATIENT)
Dept: INTERNAL MEDICINE | Facility: CLINIC | Age: 32
End: 2022-07-12

## 2022-07-12 VITALS
HEART RATE: 106 BPM | SYSTOLIC BLOOD PRESSURE: 118 MMHG | HEIGHT: 61 IN | OXYGEN SATURATION: 97 % | RESPIRATION RATE: 16 BRPM | TEMPERATURE: 97.6 F | WEIGHT: 214 LBS | BODY MASS INDEX: 40.4 KG/M2 | DIASTOLIC BLOOD PRESSURE: 76 MMHG

## 2022-07-12 DIAGNOSIS — Z00.00 ENCOUNTER FOR GENERAL ADULT MEDICAL EXAMINATION W/OUT ABNORMAL FINDINGS: ICD-10-CM

## 2022-07-12 PROCEDURE — 36415 COLL VENOUS BLD VENIPUNCTURE: CPT

## 2022-07-12 PROCEDURE — 99385 PREV VISIT NEW AGE 18-39: CPT

## 2022-07-12 RX ORDER — GABAPENTIN 300 MG/1
300 CAPSULE ORAL
Qty: 90 | Refills: 1 | Status: DISCONTINUED | COMMUNITY
Start: 2022-03-16 | End: 2022-07-12

## 2022-07-12 NOTE — HISTORY OF PRESENT ILLNESS
[de-identified] : this is a 31 yo f with hx of ovarian CA s/p LINCOLN RSO, recently finished chemo,  anxiety/depression\par \par on lexapro\par has neuropathy- finger =s and toes cramp up - tingling a lot.    had le swelling but it has improved.  can hurt to walk.   She is taking gbaapentin- helps her sleep -during the day makes her sleepy.  \par was in ICU for last chemo- allergy to carbo\par takes prn medical marijuana -was helpful for eating during chemo.  \par just started using an elliptical.  walking pilates. \par \par deit is ok - gained a lot of weight- trying juicing - certiain fruits and veggies bother her.    peach pineapple,   bebnadryl has helped

## 2022-07-12 NOTE — HISTORY OF PRESENT ILLNESS
[de-identified] : this is a 33 yo f with hx of ovarian CA s/p LINCOLN RSO, recently finished chemo,  anxiety/depression\par \par on lexapro\par has neuropathy- finger =s and toes cramp up - tingling a lot.    had le swelling but it has improved.  can hurt to walk.   She is taking gbaapentin- helps her sleep -during the day makes her sleepy.  \par was in ICU for last chemo- allergy to carbo\par takes prn medical marijuana -was helpful for eating during chemo.  \par just started using an elliptical.  walking pilates. \par \par deit is ok - gained a lot of weight- trying juicing - certiain fruits and veggies bother her.    peach pineapple,   bebnadryl has helped

## 2022-07-12 NOTE — PLAN
[FreeTextEntry1] : Neuropathy \par  - trial of lyrica and stop neurontin\par - if no sig imoprovement then neuro eval\par \par Ovarian CA\par cont AI\par discuss HRT with gyn team\par \par Anxiety  - cont lexapro and therapy\par \par Allergies labs and referral

## 2022-07-13 ENCOUNTER — NON-APPOINTMENT (OUTPATIENT)
Age: 32
End: 2022-07-13

## 2022-07-17 LAB
ALBUMIN SERPL ELPH-MCNC: 4.6 G/DL
ALP BLD-CCNC: 93 U/L
ALT SERPL-CCNC: 30 U/L
ANION GAP SERPL CALC-SCNC: 15 MMOL/L
AST SERPL-CCNC: 25 U/L
BASOPHILS # BLD AUTO: 0.04 K/UL
BASOPHILS NFR BLD AUTO: 0.4 %
BILIRUB SERPL-MCNC: 0.3 MG/DL
BUN SERPL-MCNC: 17 MG/DL
CALCIUM SERPL-MCNC: 10.4 MG/DL
CHLORIDE SERPL-SCNC: 98 MMOL/L
CHOLEST SERPL-MCNC: 184 MG/DL
CO2 SERPL-SCNC: 25 MMOL/L
CREAT SERPL-MCNC: 0.82 MG/DL
DEPRECATED DOG DANDER IGE RAST QL: 0
DOG DANDER IGE QN: <0.1 KUA/L
EGFR: 98 ML/MIN/1.73M2
EOSINOPHIL # BLD AUTO: 0.09 K/UL
EOSINOPHIL NFR BLD AUTO: 0.9 %
ESTIMATED AVERAGE GLUCOSE: 126 MG/DL
GLUCOSE SERPL-MCNC: 80 MG/DL
HBA1C MFR BLD HPLC: 6 %
HCT VFR BLD CALC: 38.5 %
HDLC SERPL-MCNC: 51 MG/DL
HGB BLD-MCNC: 11.6 G/DL
IMM GRANULOCYTES NFR BLD AUTO: 0.2 %
LDLC SERPL CALC-MCNC: 101 MG/DL
LYMPHOCYTES # BLD AUTO: 3.68 K/UL
LYMPHOCYTES NFR BLD AUTO: 38.2 %
MAN DIFF?: NORMAL
MCHC RBC-ENTMCNC: 28.4 PG
MCHC RBC-ENTMCNC: 30.1 GM/DL
MCV RBC AUTO: 94.1 FL
MONOCYTES # BLD AUTO: 0.62 K/UL
MONOCYTES NFR BLD AUTO: 6.4 %
NEUTROPHILS # BLD AUTO: 5.18 K/UL
NEUTROPHILS NFR BLD AUTO: 53.9 %
NONHDLC SERPL-MCNC: 133 MG/DL
PLATELET # BLD AUTO: 255 K/UL
POTASSIUM SERPL-SCNC: 4.7 MMOL/L
PROT SERPL-MCNC: 8 G/DL
RBC # BLD: 4.09 M/UL
RBC # FLD: 13.8 %
SODIUM SERPL-SCNC: 139 MMOL/L
TRIGL SERPL-MCNC: 163 MG/DL
TSH SERPL-ACNC: 1.66 UIU/ML
VIT B12 SERPL-MCNC: 274 PG/ML
WBC # FLD AUTO: 9.63 K/UL

## 2022-07-29 ENCOUNTER — APPOINTMENT (OUTPATIENT)
Dept: DERMATOLOGY | Facility: CLINIC | Age: 32
End: 2022-07-29

## 2022-07-29 ENCOUNTER — OUTPATIENT (OUTPATIENT)
Dept: OUTPATIENT SERVICES | Facility: HOSPITAL | Age: 32
LOS: 1 days | Discharge: HOME | End: 2022-07-29

## 2022-07-29 DIAGNOSIS — C56.9 MALIGNANT NEOPLASM OF UNSPECIFIED OVARY: ICD-10-CM

## 2022-07-29 DIAGNOSIS — Z98.890 OTHER SPECIFIED POSTPROCEDURAL STATES: Chronic | ICD-10-CM

## 2022-08-08 ENCOUNTER — OUTPATIENT (OUTPATIENT)
Dept: OUTPATIENT SERVICES | Facility: HOSPITAL | Age: 32
LOS: 1 days | Discharge: HOME | End: 2022-08-08

## 2022-08-08 DIAGNOSIS — F43.23 ADJUSTMENT DISORDER WITH MIXED ANXIETY AND DEPRESSED MOOD: ICD-10-CM

## 2022-08-08 DIAGNOSIS — Z98.890 OTHER SPECIFIED POSTPROCEDURAL STATES: Chronic | ICD-10-CM

## 2022-08-08 DIAGNOSIS — F43.20 ADJUSTMENT DISORDER, UNSPECIFIED: ICD-10-CM

## 2022-08-09 ENCOUNTER — APPOINTMENT (OUTPATIENT)
Dept: HEMATOLOGY ONCOLOGY | Facility: CLINIC | Age: 32
End: 2022-08-09

## 2022-08-09 PROCEDURE — 90837 PSYTX W PT 60 MINUTES: CPT | Mod: 95

## 2022-08-17 ENCOUNTER — APPOINTMENT (OUTPATIENT)
Dept: INFUSION THERAPY | Facility: HOSPITAL | Age: 32
End: 2022-08-17

## 2022-08-17 ENCOUNTER — APPOINTMENT (OUTPATIENT)
Dept: HEMATOLOGY ONCOLOGY | Facility: CLINIC | Age: 32
End: 2022-08-17

## 2022-08-17 ENCOUNTER — RESULT REVIEW (OUTPATIENT)
Age: 32
End: 2022-08-17

## 2022-08-17 VITALS
DIASTOLIC BLOOD PRESSURE: 82 MMHG | BODY MASS INDEX: 41.33 KG/M2 | HEART RATE: 84 BPM | OXYGEN SATURATION: 96 % | TEMPERATURE: 97 F | SYSTOLIC BLOOD PRESSURE: 122 MMHG | HEIGHT: 60.98 IN | WEIGHT: 218.92 LBS | RESPIRATION RATE: 16 BRPM

## 2022-08-17 LAB
BASOPHILS # BLD AUTO: 0.04 K/UL — SIGNIFICANT CHANGE UP (ref 0–0.2)
BASOPHILS NFR BLD AUTO: 0.5 % — SIGNIFICANT CHANGE UP (ref 0–2)
CANCER AG125 SERPL-ACNC: 7 U/ML — SIGNIFICANT CHANGE UP
EOSINOPHIL # BLD AUTO: 0.15 K/UL — SIGNIFICANT CHANGE UP (ref 0–0.5)
EOSINOPHIL NFR BLD AUTO: 2 % — SIGNIFICANT CHANGE UP (ref 0–6)
HCT VFR BLD CALC: 34.6 % — SIGNIFICANT CHANGE UP (ref 34.5–45)
HGB BLD-MCNC: 11.1 G/DL — LOW (ref 11.5–15.5)
IMM GRANULOCYTES NFR BLD AUTO: 0.3 % — SIGNIFICANT CHANGE UP (ref 0–1.5)
LYMPHOCYTES # BLD AUTO: 3.03 K/UL — SIGNIFICANT CHANGE UP (ref 1–3.3)
LYMPHOCYTES # BLD AUTO: 40.3 % — SIGNIFICANT CHANGE UP (ref 13–44)
MCHC RBC-ENTMCNC: 28 PG — SIGNIFICANT CHANGE UP (ref 27–34)
MCHC RBC-ENTMCNC: 32.1 G/DL — SIGNIFICANT CHANGE UP (ref 32–36)
MCV RBC AUTO: 87.4 FL — SIGNIFICANT CHANGE UP (ref 80–100)
MONOCYTES # BLD AUTO: 0.5 K/UL — SIGNIFICANT CHANGE UP (ref 0–0.9)
MONOCYTES NFR BLD AUTO: 6.7 % — SIGNIFICANT CHANGE UP (ref 2–14)
NEUTROPHILS # BLD AUTO: 3.77 K/UL — SIGNIFICANT CHANGE UP (ref 1.8–7.4)
NEUTROPHILS NFR BLD AUTO: 50.2 % — SIGNIFICANT CHANGE UP (ref 43–77)
NRBC # BLD: 0 /100 WBCS — SIGNIFICANT CHANGE UP (ref 0–0)
PLATELET # BLD AUTO: 237 K/UL — SIGNIFICANT CHANGE UP (ref 150–400)
RBC # BLD: 3.96 M/UL — SIGNIFICANT CHANGE UP (ref 3.8–5.2)
RBC # FLD: 13.1 % — SIGNIFICANT CHANGE UP (ref 10.3–14.5)
WBC # BLD: 7.51 K/UL — SIGNIFICANT CHANGE UP (ref 3.8–10.5)
WBC # FLD AUTO: 7.51 K/UL — SIGNIFICANT CHANGE UP (ref 3.8–10.5)

## 2022-08-17 PROCEDURE — 99214 OFFICE O/P EST MOD 30 MIN: CPT

## 2022-08-18 LAB
ALBUMIN SERPL ELPH-MCNC: 4.5 G/DL — SIGNIFICANT CHANGE UP (ref 3.3–5)
ALP SERPL-CCNC: 95 U/L — SIGNIFICANT CHANGE UP (ref 40–120)
ALT FLD-CCNC: 36 U/L — SIGNIFICANT CHANGE UP (ref 10–45)
ANION GAP SERPL CALC-SCNC: 16 MMOL/L — SIGNIFICANT CHANGE UP (ref 5–17)
AST SERPL-CCNC: 32 U/L — SIGNIFICANT CHANGE UP (ref 10–40)
BILIRUB SERPL-MCNC: 0.2 MG/DL — SIGNIFICANT CHANGE UP (ref 0.2–1.2)
BUN SERPL-MCNC: 16 MG/DL — SIGNIFICANT CHANGE UP (ref 7–23)
CALCIUM SERPL-MCNC: 9.6 MG/DL — SIGNIFICANT CHANGE UP (ref 8.4–10.5)
CHLORIDE SERPL-SCNC: 102 MMOL/L — SIGNIFICANT CHANGE UP (ref 96–108)
CO2 SERPL-SCNC: 22 MMOL/L — SIGNIFICANT CHANGE UP (ref 22–31)
CREAT SERPL-MCNC: 0.71 MG/DL — SIGNIFICANT CHANGE UP (ref 0.5–1.3)
EGFR: 117 ML/MIN/1.73M2 — SIGNIFICANT CHANGE UP
GLUCOSE SERPL-MCNC: 119 MG/DL — HIGH (ref 70–99)
MAGNESIUM SERPL-MCNC: 1.6 MG/DL — SIGNIFICANT CHANGE UP (ref 1.6–2.6)
POTASSIUM SERPL-MCNC: 4.2 MMOL/L — SIGNIFICANT CHANGE UP (ref 3.5–5.3)
POTASSIUM SERPL-SCNC: 4.2 MMOL/L — SIGNIFICANT CHANGE UP (ref 3.5–5.3)
PROT SERPL-MCNC: 7.8 G/DL — SIGNIFICANT CHANGE UP (ref 6–8.3)
SODIUM SERPL-SCNC: 140 MMOL/L — SIGNIFICANT CHANGE UP (ref 135–145)

## 2022-09-09 ENCOUNTER — NON-APPOINTMENT (OUTPATIENT)
Age: 32
End: 2022-09-09

## 2022-09-09 NOTE — HISTORY OF PRESENT ILLNESS
[Disease: _____________________] : Disease: [unfilled] [AJCC Stage: ____] : AJCC Stage: [unfilled] [de-identified] : Referred by Dr. Magalys Fraire\par \par Ms. 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  \par \par Pt. states she developed abdominal pain about a year ago. She presented to GYN in [place] and was advised she had a cyst on each ovary. She was prescribed birth control and advised to follow up in 3 months. She followed up in 3 months and was advised that cysts had become larger but there was nothing to do at that time and that should she experience severe pain to follow up with ER because this could signify cyst rupture. Patient decided to stop birth control pills because she was experiencing migraines, which stopped after stopping meds. \par \par Patient states she did go to the  ER in [place] because of severe pain and workup was normal and was discharged. She then traveled to [facility] in February and presented to ED there for severe pain. She was advised she had endometriosis and advised to follow up outpatient. Her insurance did not cover her primary GYN and was then referred to Dr. Christensen. \par \par She was subsequently referred to Dr. Fraire for gyn onc evaluation and underwent RA-laparoscopic LSO and R ovarian cystectomy on 6/15/21, followed by exp lap, LINCOLN-RSO, omentectomy, and optimal tumor debulking surgery (7/6/21). \par \par Final pathology:\par Final Diagnosis\par 1. Uterus, cervix and right ovary and fallopian tube (total hysterectomy and salpingo-oophorectomy):\par - Focal low-grade micropapillary serous carcinoma (best seen on slide 1O) arising in a borderline tumor of the ovary.\par - The overall size of the ovarian tumor is 2.5 x 2.0 x 1.2 cm.\par - Involvement of the ovarian surface by borderline tumor is identified.\par - Small invasive implants involving the ovary (slides 1M-O), uterine serosa (slide 1G), and the fallopian tube.\par - Chronic cervicitis and squamous metaplasia.\par - Secretory endometrium.\par - Uterine serosal adhesion and tubo-ovarian adhesion.\par - Post-surgery changes of the ovary.\par - Benign paratubal cysts.\par \par 2. Infragastric omentum (omentectomy):\par - Multiple noninvasive implants.\par - One (1) omental lymph node, negative for tumor.\par - Focal endosalpingiosis.\par \par GYN Oncology Tumor Board Consensus on 7/12/21: Clinical Trial, BRCA testing\par Diagnosis: Stage IIIB low grade serous ovarian carcinoma.\par \par 8/11/2021: Started adjuvant carbo/taxol  [de-identified] : [employer] genetic panel: MSH6 [id number]>C (VUS) [FreeTextEntry1] : surgery (7/6/21)  --> Adjuvant Carbo/Taxol started 8/11/2021 s/p cycle 6/6 [de-identified] : Neli comes in for scheduled 3-month follow up. She feels well overall. \par She continues on letrozole, which she is tolerating without significant issues. She reports residual neuropathy of her feet persistent since completing chemotherapy despite using gabapentin and medical cannabis. Sometimes she feels that her balance is slightly off although she has no difficulty with range of motion. \par Otherwise she feels ok and denies fevers, chills, n/v, abdominal pain, vaginal discharge or bleeding, urinary symptoms, cough, CP, SOB.\par \par \par

## 2022-09-09 NOTE — PHYSICAL EXAM
[Fully active, able to carry on all pre-disease performance without restriction] : Status 0 - Fully active, able to carry on all pre-disease performance without restriction [Normal] : affect appropriate [de-identified] : vertical abdominal incision site c/d/i, healing well. Nontender, no HSM

## 2022-09-14 PROBLEM — R97.1 ELEVATED CA-125: Status: ACTIVE | Noted: 2021-05-26

## 2022-09-19 ENCOUNTER — APPOINTMENT (OUTPATIENT)
Dept: HEMATOLOGY ONCOLOGY | Facility: CLINIC | Age: 32
End: 2022-09-19

## 2022-09-19 PROCEDURE — 90834 PSYTX W PT 45 MINUTES: CPT | Mod: 95

## 2022-09-21 ENCOUNTER — APPOINTMENT (OUTPATIENT)
Dept: GYNECOLOGIC ONCOLOGY | Facility: CLINIC | Age: 32
End: 2022-09-21

## 2022-09-21 VITALS
HEIGHT: 60.98 IN | WEIGHT: 226 LBS | HEART RATE: 102 BPM | SYSTOLIC BLOOD PRESSURE: 122 MMHG | BODY MASS INDEX: 42.67 KG/M2 | DIASTOLIC BLOOD PRESSURE: 83 MMHG

## 2022-09-21 DIAGNOSIS — R97.1 ELEVATED CANCER ANTIGEN 125 [CA 125]: ICD-10-CM

## 2022-09-21 PROCEDURE — 99214 OFFICE O/P EST MOD 30 MIN: CPT

## 2022-09-27 ENCOUNTER — INPATIENT (INPATIENT)
Facility: HOSPITAL | Age: 32
LOS: 1 days | Discharge: HOME | End: 2022-09-29
Attending: EMERGENCY MEDICINE | Admitting: EMERGENCY MEDICINE
Payer: MEDICAID

## 2022-09-27 VITALS
OXYGEN SATURATION: 100 % | HEART RATE: 97 BPM | RESPIRATION RATE: 18 BRPM | DIASTOLIC BLOOD PRESSURE: 77 MMHG | HEIGHT: 61 IN | SYSTOLIC BLOOD PRESSURE: 129 MMHG | TEMPERATURE: 98 F

## 2022-09-27 DIAGNOSIS — C56.9 MALIGNANT NEOPLASM OF UNSPECIFIED OVARY: ICD-10-CM

## 2022-09-27 DIAGNOSIS — R10.13 EPIGASTRIC PAIN: ICD-10-CM

## 2022-09-27 DIAGNOSIS — Z98.890 OTHER SPECIFIED POSTPROCEDURAL STATES: Chronic | ICD-10-CM

## 2022-09-27 DIAGNOSIS — Z29.9 ENCOUNTER FOR PROPHYLACTIC MEASURES, UNSPECIFIED: ICD-10-CM

## 2022-09-27 DIAGNOSIS — R10.9 UNSPECIFIED ABDOMINAL PAIN: ICD-10-CM

## 2022-09-27 LAB
ALBUMIN SERPL ELPH-MCNC: 4.6 G/DL — SIGNIFICANT CHANGE UP (ref 3.3–5)
ALP SERPL-CCNC: 96 U/L — SIGNIFICANT CHANGE UP (ref 40–120)
ALT FLD-CCNC: 40 U/L — HIGH (ref 4–33)
ANION GAP SERPL CALC-SCNC: 13 MMOL/L — SIGNIFICANT CHANGE UP (ref 7–14)
APPEARANCE UR: CLEAR — SIGNIFICANT CHANGE UP
AST SERPL-CCNC: 34 U/L — HIGH (ref 4–32)
BASOPHILS # BLD AUTO: 0.05 K/UL — SIGNIFICANT CHANGE UP (ref 0–0.2)
BASOPHILS NFR BLD AUTO: 0.5 % — SIGNIFICANT CHANGE UP (ref 0–2)
BILIRUB SERPL-MCNC: 0.4 MG/DL — SIGNIFICANT CHANGE UP (ref 0.2–1.2)
BILIRUB UR-MCNC: NEGATIVE — SIGNIFICANT CHANGE UP
BLOOD GAS VENOUS COMPREHENSIVE RESULT: SIGNIFICANT CHANGE UP
BUN SERPL-MCNC: 14 MG/DL — SIGNIFICANT CHANGE UP (ref 7–23)
CALCIUM SERPL-MCNC: 9.7 MG/DL — SIGNIFICANT CHANGE UP (ref 8.4–10.5)
CHLORIDE SERPL-SCNC: 102 MMOL/L — SIGNIFICANT CHANGE UP (ref 98–107)
CO2 SERPL-SCNC: 25 MMOL/L — SIGNIFICANT CHANGE UP (ref 22–31)
COLOR SPEC: YELLOW — SIGNIFICANT CHANGE UP
CREAT SERPL-MCNC: 0.74 MG/DL — SIGNIFICANT CHANGE UP (ref 0.5–1.3)
DIFF PNL FLD: ABNORMAL
EGFR: 111 ML/MIN/1.73M2 — SIGNIFICANT CHANGE UP
EOSINOPHIL # BLD AUTO: 0.1 K/UL — SIGNIFICANT CHANGE UP (ref 0–0.5)
EOSINOPHIL NFR BLD AUTO: 1.1 % — SIGNIFICANT CHANGE UP (ref 0–6)
GLUCOSE SERPL-MCNC: 104 MG/DL — HIGH (ref 70–99)
GLUCOSE UR QL: NEGATIVE — SIGNIFICANT CHANGE UP
HCG SERPL-ACNC: <5 MIU/ML — SIGNIFICANT CHANGE UP
HCT VFR BLD CALC: 38.2 % — SIGNIFICANT CHANGE UP (ref 34.5–45)
HGB BLD-MCNC: 11.9 G/DL — SIGNIFICANT CHANGE UP (ref 11.5–15.5)
IANC: 5.34 K/UL — SIGNIFICANT CHANGE UP (ref 1.8–7.4)
IMM GRANULOCYTES NFR BLD AUTO: 0.4 % — SIGNIFICANT CHANGE UP (ref 0–0.9)
KETONES UR-MCNC: NEGATIVE — SIGNIFICANT CHANGE UP
LEUKOCYTE ESTERASE UR-ACNC: NEGATIVE — SIGNIFICANT CHANGE UP
LIDOCAIN IGE QN: 26 U/L — SIGNIFICANT CHANGE UP (ref 7–60)
LYMPHOCYTES # BLD AUTO: 3.07 K/UL — SIGNIFICANT CHANGE UP (ref 1–3.3)
LYMPHOCYTES # BLD AUTO: 32.9 % — SIGNIFICANT CHANGE UP (ref 13–44)
MCHC RBC-ENTMCNC: 27.6 PG — SIGNIFICANT CHANGE UP (ref 27–34)
MCHC RBC-ENTMCNC: 31.2 GM/DL — LOW (ref 32–36)
MCV RBC AUTO: 88.6 FL — SIGNIFICANT CHANGE UP (ref 80–100)
MONOCYTES # BLD AUTO: 0.74 K/UL — SIGNIFICANT CHANGE UP (ref 0–0.9)
MONOCYTES NFR BLD AUTO: 7.9 % — SIGNIFICANT CHANGE UP (ref 2–14)
NEUTROPHILS # BLD AUTO: 5.34 K/UL — SIGNIFICANT CHANGE UP (ref 1.8–7.4)
NEUTROPHILS NFR BLD AUTO: 57.2 % — SIGNIFICANT CHANGE UP (ref 43–77)
NITRITE UR-MCNC: NEGATIVE — SIGNIFICANT CHANGE UP
NRBC # BLD: 0 /100 WBCS — SIGNIFICANT CHANGE UP (ref 0–0)
NRBC # FLD: 0 K/UL — SIGNIFICANT CHANGE UP (ref 0–0)
PH UR: 5.5 — SIGNIFICANT CHANGE UP (ref 5–8)
PLATELET # BLD AUTO: 285 K/UL — SIGNIFICANT CHANGE UP (ref 150–400)
POTASSIUM SERPL-MCNC: 4.5 MMOL/L — SIGNIFICANT CHANGE UP (ref 3.5–5.3)
POTASSIUM SERPL-SCNC: 4.5 MMOL/L — SIGNIFICANT CHANGE UP (ref 3.5–5.3)
PROT SERPL-MCNC: 8.4 G/DL — HIGH (ref 6–8.3)
PROT UR-MCNC: ABNORMAL
RBC # BLD: 4.31 M/UL — SIGNIFICANT CHANGE UP (ref 3.8–5.2)
RBC # FLD: 13.1 % — SIGNIFICANT CHANGE UP (ref 10.3–14.5)
SARS-COV-2 RNA SPEC QL NAA+PROBE: SIGNIFICANT CHANGE UP
SODIUM SERPL-SCNC: 140 MMOL/L — SIGNIFICANT CHANGE UP (ref 135–145)
SP GR SPEC: 1.02 — SIGNIFICANT CHANGE UP (ref 1.01–1.05)
UROBILINOGEN FLD QL: SIGNIFICANT CHANGE UP
WBC # BLD: 9.34 K/UL — SIGNIFICANT CHANGE UP (ref 3.8–10.5)
WBC # FLD AUTO: 9.34 K/UL — SIGNIFICANT CHANGE UP (ref 3.8–10.5)

## 2022-09-27 PROCEDURE — 99223 1ST HOSP IP/OBS HIGH 75: CPT | Mod: GC

## 2022-09-27 PROCEDURE — 76705 ECHO EXAM OF ABDOMEN: CPT | Mod: 26

## 2022-09-27 PROCEDURE — 74177 CT ABD & PELVIS W/CONTRAST: CPT | Mod: 26,MA

## 2022-09-27 PROCEDURE — 99221 1ST HOSP IP/OBS SF/LOW 40: CPT

## 2022-09-27 PROCEDURE — 99285 EMERGENCY DEPT VISIT HI MDM: CPT

## 2022-09-27 RX ORDER — GABAPENTIN 400 MG/1
300 CAPSULE ORAL DAILY
Refills: 0 | Status: DISCONTINUED | OUTPATIENT
Start: 2022-09-27 | End: 2022-09-27

## 2022-09-27 RX ORDER — LANOLIN ALCOHOL/MO/W.PET/CERES
3 CREAM (GRAM) TOPICAL AT BEDTIME
Refills: 0 | Status: DISCONTINUED | OUTPATIENT
Start: 2022-09-27 | End: 2022-09-29

## 2022-09-27 RX ORDER — ONDANSETRON 8 MG/1
4 TABLET, FILM COATED ORAL EVERY 8 HOURS
Refills: 0 | Status: DISCONTINUED | OUTPATIENT
Start: 2022-09-27 | End: 2022-09-29

## 2022-09-27 RX ORDER — IOHEXOL 300 MG/ML
30 INJECTION, SOLUTION INTRAVENOUS ONCE
Refills: 0 | Status: COMPLETED | OUTPATIENT
Start: 2022-09-27 | End: 2022-09-27

## 2022-09-27 RX ORDER — ONDANSETRON 8 MG/1
4 TABLET, FILM COATED ORAL ONCE
Refills: 0 | Status: COMPLETED | OUTPATIENT
Start: 2022-09-27 | End: 2022-09-27

## 2022-09-27 RX ORDER — SODIUM CHLORIDE 9 MG/ML
1000 INJECTION, SOLUTION INTRAVENOUS
Refills: 0 | Status: DISCONTINUED | OUTPATIENT
Start: 2022-09-27 | End: 2022-09-29

## 2022-09-27 RX ORDER — GABAPENTIN 400 MG/1
300 CAPSULE ORAL AT BEDTIME
Refills: 0 | Status: DISCONTINUED | OUTPATIENT
Start: 2022-09-27 | End: 2022-09-29

## 2022-09-27 RX ORDER — MORPHINE SULFATE 50 MG/1
4 CAPSULE, EXTENDED RELEASE ORAL ONCE
Refills: 0 | Status: DISCONTINUED | OUTPATIENT
Start: 2022-09-27 | End: 2022-09-27

## 2022-09-27 RX ORDER — SODIUM CHLORIDE 9 MG/ML
1000 INJECTION INTRAMUSCULAR; INTRAVENOUS; SUBCUTANEOUS ONCE
Refills: 0 | Status: COMPLETED | OUTPATIENT
Start: 2022-09-27 | End: 2022-09-27

## 2022-09-27 RX ORDER — MORPHINE SULFATE 50 MG/1
4 CAPSULE, EXTENDED RELEASE ORAL EVERY 4 HOURS
Refills: 0 | Status: DISCONTINUED | OUTPATIENT
Start: 2022-09-27 | End: 2022-09-29

## 2022-09-27 RX ORDER — LETROZOLE 2.5 MG/1
2.5 TABLET, FILM COATED ORAL DAILY
Refills: 0 | Status: DISCONTINUED | OUTPATIENT
Start: 2022-09-27 | End: 2022-09-29

## 2022-09-27 RX ORDER — FAMOTIDINE 10 MG/ML
20 INJECTION INTRAVENOUS ONCE
Refills: 0 | Status: COMPLETED | OUTPATIENT
Start: 2022-09-27 | End: 2022-09-27

## 2022-09-27 RX ADMIN — SODIUM CHLORIDE 100 MILLILITER(S): 9 INJECTION, SOLUTION INTRAVENOUS at 22:51

## 2022-09-27 RX ADMIN — MORPHINE SULFATE 4 MILLIGRAM(S): 50 CAPSULE, EXTENDED RELEASE ORAL at 23:31

## 2022-09-27 RX ADMIN — Medication 30 MILLILITER(S): at 07:06

## 2022-09-27 RX ADMIN — ONDANSETRON 4 MILLIGRAM(S): 8 TABLET, FILM COATED ORAL at 07:06

## 2022-09-27 RX ADMIN — IOHEXOL 30 MILLILITER(S): 300 INJECTION, SOLUTION INTRAVENOUS at 07:19

## 2022-09-27 RX ADMIN — LETROZOLE 2.5 MILLIGRAM(S): 2.5 TABLET, FILM COATED ORAL at 22:50

## 2022-09-27 RX ADMIN — SODIUM CHLORIDE 1000 MILLILITER(S): 9 INJECTION INTRAMUSCULAR; INTRAVENOUS; SUBCUTANEOUS at 07:06

## 2022-09-27 RX ADMIN — ONDANSETRON 4 MILLIGRAM(S): 8 TABLET, FILM COATED ORAL at 23:01

## 2022-09-27 RX ADMIN — GABAPENTIN 300 MILLIGRAM(S): 400 CAPSULE ORAL at 21:37

## 2022-09-27 RX ADMIN — FAMOTIDINE 20 MILLIGRAM(S): 10 INJECTION INTRAVENOUS at 07:06

## 2022-09-27 RX ADMIN — MORPHINE SULFATE 4 MILLIGRAM(S): 50 CAPSULE, EXTENDED RELEASE ORAL at 23:01

## 2022-09-27 RX ADMIN — SODIUM CHLORIDE 100 MILLILITER(S): 9 INJECTION, SOLUTION INTRAVENOUS at 21:37

## 2022-09-27 RX ADMIN — MORPHINE SULFATE 4 MILLIGRAM(S): 50 CAPSULE, EXTENDED RELEASE ORAL at 07:06

## 2022-09-27 NOTE — H&P ADULT - PROBLEM SELECTOR PLAN 2
S/p LINCOLN/BSO in July 2021, on letrozole now. Follows with onc and surgeon, q6mo surveillance CT scans  - c/w letrozole  - Dr Fraire aware of hospitalization

## 2022-09-27 NOTE — ED PROVIDER NOTE - PROGRESS NOTE DETAILS
Jeramy Dorsey MD, PGY1  Reassessed patient. Still having RUQ abdominal pain with nausea. Gallstones seen on US, no evidence of cholecystitis. Waiting on CT scan. Pt improved but still with RUQ pain, mild ttp, no rebound, will call surgery consult, will also let gyn know pt is in ED Informed family pt to be admittedd- don't recall pmd's name but affiliated with [facility]- will admit to hospitalist.  Verbal handoff given to MAR [id number]

## 2022-09-27 NOTE — ED ADULT TRIAGE NOTE - CHIEF COMPLAINT QUOTE
pt c/o intermittent epigastric pain since Thursday with nausea and today woke up with sharp pain. PMHx ovarian ca, hemmorhoids

## 2022-09-27 NOTE — CONSULT NOTE ADULT - ASSESSMENT
A/P: Pt is a 33 yo woman with stage 3 ovarian serous carcinoma s/p LINCOLN/BSO in June+July '21 currently on letrozole, migraines, and kidney stones who presents with 4 days of epigastric pain that progressed to 2 hours of sharp >10/10 epigastric pain. TAUS of RUQ revealed +wei sign and cholelithiasis. CT scan revealed soft tissue changes around pancreas with concern for pancreatitis, though serum lipase and bili are normal. Given no evidence of neoplasmic disease on recent imaging and no recent chemotherapy cycles, patient's primary pathology likely cholelithiasis based on imaging.     -Primary management of symptoms per medicine team   -continue letrozole per Heme/Onc outpatient plan  -f/u Surgery recs for possible MRCP to characterize possible etiology i.e. cholelithiasis.  -GYN ONC following    Patient seen and examined with Dr. Juno Oliver, PGY2  A/P: Pt is a 33 yo woman with Stage IIIC ovarian serous carcinoma s/p LINCOLN/BSO in June+July '21 currently on letrozole, migraines, and kidney stones who presents with 4 days of epigastric pain that progressed to 2 hours of sharp >10/10 epigastric pain. TAUS of RUQ revealed -wei sign or evidence of acute cholecystitis, however, there is evidence of fatty infiltration of the liver and cholelithiasis. CT scan revealed mild infiltration of fat around pancreas with concern for pancreatitis, though serum lipase and bili are normal. Given no evidence of neoplasmic disease on recent imaging and no recent chemotherapy cycles, patient's primary pathology likely cholelithiasis vs pancreatitis based on imaging.     -Primary management of symptoms per medicine team   -continue letrozole per Heme/Onc outpatient plan  -f/u Surgery recs for possible MRCP to characterize etiology i.e. cholelithiasis.  -GYN ONC following    Patient seen and examined with Dr. Juno Oliver, PGY2

## 2022-09-27 NOTE — H&P ADULT - HISTORY OF PRESENT ILLNESS
Pt is a 33 yo woman with stage 3 ovarian ca s/p LINCOLN/BSO in June+July '21 currently on letrozole, migraines, and kidney stones who presents with 2 hours of sharp >10/10 epigastric pain. One month ago she developed constipation, talked to her surgeon (Magalys Fraire) who told her to take OTCs which have not helped. 5 days ago, she had 1 episode of sharp epigastric pain upon wakening that lasted 2 hours and resolved after taking a nap and medical marijuana. 2 days later she began to feel cramping in her back that felt similar to pain she had in the past while getting chemo. Yesterday she developed worsening back and abdominal cramps alongside fatigue; she attributed all of this to constipation. She took laxatives and naproxen and had a BM which did not help the pain. Last night she ate and went to sleep, cramps improved. She woke up this am with severe, sharp epigastric pain a/w nausea, no vomiting, causing her to come to ED where she had a watery BM that did not provide relief. Pain not a/w eating.     Pt denies recent illness, subj fever, vomiting, loss of appetite, alcohol use, cp, sob, urinary sxs. Reports pain is different from kidney stone pain in the past.     ED course: VSS, afebrile. Received 4mg iv morphine, zofran, pepcid, 1L NS.   U/S showing Fatty infiltration of the liver and cholelithiasis, without evidence of acute cholecystitis or biliary ductal dilatation.  CT a/p showing Mild infiltration of the fat adjacent to the pancreatic uncinate process and at the root of the small bowel mesentery, nonspecific, acute pancreatitis is not excluded. Correlate with lipase levels.   Pt is a 33 yo woman with stage 3 ovarian ca s/p LINCOLN/BSO in June+July '21 currently on letrozole, migraines, and kidney stones who presents with 2 hours of sharp >10/10 epigastric pain. One month ago she developed constipation, talked to her surgeon (Magalys Fraire) who told her to take OTCs which have not helped. 5 days ago, she had 1 episode of sharp epigastric pain upon wakening that lasted 2 hours and resolved after taking a nap and medical marijuana. 2 days later she began to feel cramping in her back that felt similar to pain she had in the past while getting chemo. Yesterday she developed worsening back and abdominal cramps alongside fatigue; she attributed all of this to constipation. She took laxatives and naproxen and had a BM which did not help the pain. Last night she ate and went to sleep, cramps improved. She woke up this am with severe, sharp epigastric pain a/w nausea, no vomiting, causing her to come to ED where she had a watery BM that did not provide relief. Pain not a/w eating.     Pt denies recent illness, subj fever, vomiting, loss of appetite, alcohol use, cp, sob, urinary sxs. Reports pain is different from kidney stone pain in the past and not felt in esophagus     ED course: VSS, afebrile. Received 4mg iv morphine, zofran, pepcid, 1L NS.   U/S showing Fatty infiltration of the liver and cholelithiasis, without evidence of acute cholecystitis or biliary ductal dilatation.  CT a/p showing Mild infiltration of the fat adjacent to the pancreatic uncinate process and at the root of the small bowel mesentery, nonspecific, acute pancreatitis is not excluded. Correlate with lipase levels.

## 2022-09-27 NOTE — CONSULT NOTE ADULT - SUBJECTIVE AND OBJECTIVE BOX
HPI: Pt is a 31 yo woman with stage 3 ovarian ca s/p LINCOLN/BSO in  currently on letrozole, migraines, and kidney stones who presents with 2 hours of sharp >10/10 epigastric pain. One month ago she developed constipation, talked to her surgeon (Magalys Fraire) who told her to take OTCs which have not helped. 5 days ago, she had 1 episode of sharp epigastric pain upon wakening that lasted 2 hours and resolved after taking a nap and medical marijuana. 2 days later she began to feel cramping in her back that felt similar to pain she had in the past while getting chemo. Yesterday she developed worsening back and abdominal cramps alongside fatigue; she attributed all of this to constipation. She took laxatives and naproxen and had a BM which did not help the pain. Last night she ate and went to sleep, cramps improved. She woke up this am with severe, sharp epigastric pain a/w nausea, no vomiting, causing her to come to ED where she had a watery BM that did not provide relief. Pain not a/w eating.     Patient states that she had mild chills throughout this time, though denies recent illness, subjective fever, vomiting, loss of appetite, alcohol use, cp, sob, urinary sxs. Reports pain is different from kidney stone pain in the past and not felt in esophagus     ED course: VSS, afebrile. Received 4mg iv morphine, zofran, pepcid, 1L NS.   U/S showing Fatty infiltration of the liver and cholelithiasis, without evidence of acute cholecystitis or biliary ductal dilatation.  CT a/p showing Mild infiltration of the fat adjacent to the pancreatic uncinate process and at the root of the small bowel mesentery, nonspecific, acute pancreatitis is not excluded. Correlate with lipase levels.   (27 Sep 2022 16:08)    PMHX; Migraine, Vertigo, Asthma, Kidney stone, Stage IIIC Ovarian Serous carcinoma  PSHX; LINCOLN BSO, staging in Aug 2021  POBHX; G0  PGYNHX: hx of ovarian serous carcinoma,   SOCIAL: denies e/t/d use  Allergies: No Known Drug Allergies, Nuts (Headache), onions, kiwi- headache (Other), carboplatin (Muscle Pain; Flushing)  MEDS: melatonin 3 milliGRAM(s) Oral at bedtime PRN    Vital Signs Last 24 Hrs  T(C): 36.7 (27 Sep 2022 16:35), Max: 36.7 (27 Sep 2022 11:56)  T(F): 98.1 (27 Sep 2022 16:35), Max: 98.1 (27 Sep 2022 16:35)  HR: 92 (27 Sep 2022 16:35) (87 - 97)  BP: 123/69 (27 Sep 2022 16:35) (105/65 - 129/77)  RR: 16 (27 Sep 2022 16:35) (16 - 18)  SpO2: 97% (27 Sep 2022 16:35) (97% - 100%)    Parameters below as of 27 Sep 2022 16:35  Patient On (Oxygen Delivery Method): room air         PHYSICAL EXAM:  CHEST/LUNG: Clear to percussion bilaterally; No rales, rhonchi, wheezing, or rubs  HEART: Regular rate and rhythm; No murmurs, rubs, or gallops  ABDOMEN: Soft, TTP in epigastric/ subxyphoid region, nontender to palpation in lower abdomen, normal bowel sounds.   EXTREMITIES:  2+ Peripheral Pulses, No clubbing, cyanosis, or edema    LABS:                        11.9   9.34  )-----------( 285      ( 27 Sep 2022 07:00 )             38.2         140  |  102  |  14  ----------------------------<  104<H>  4.5   |  25  |  0.74    Ca    9.7      27 Sep 2022 07:00    TPro  8.4<H>  /  Alb  4.6  /  TBili  0.4  /  DBili  x   /  AST  34<H>  /  ALT  40<H>  /  AlkPhos  96      Urinalysis Basic - ( 27 Sep 2022 07:00 )    Color: Yellow / Appearance: Clear / S.025 / pH: x  Gluc: x / Ketone: Negative  / Bili: Negative / Urobili: <2 mg/dL   Blood: x / Protein: Trace / Nitrite: Negative   Leuk Esterase: Negative / RBC: 36 /HPF / WBC 7 /HPF   Sq Epi: x / Non Sq Epi: 4 /HPF / Bacteria: Negative        RADIOLOGY STUDIES:  < from: CT Abdomen and Pelvis w/ Oral Cont and w/ IV Cont (22 @ 10:10) >  ACC: [id number] EXAM:  CT ABDOMEN AND PELVIS OC IC                          PROCEDURE DATE:  2022          INTERPRETATION:  CLINICAL INFORMATION: Evaluate pancreatic abscess versus   mass. Nausea, vomiting. History of ovarian cancer.    COMPARISON: CT abdomen pelvis 2022.    CONTRAST/COMPLICATIONS:  IV Contrast: Omnipaque 350  70 cc administered   0 cc discarded  Oral Contrast: Omnipaque 300   Fruit 2o  Complications: None reported at time of study completion    PROCEDURE:  CT of the Abdomen and Pelvis was performed.  Arterial and Portal Venous phases were acquired.  Sagittal and coronal reformats were performed.    FINDINGS:  LOWER CHEST: Within normal limits.    LIVER: Steatosis.  BILE DUCTS: Normal caliber.  GALLBLADDER: Within normal limits.  SPLEEN: Within normal limits.  PANCREAS: Normal enhancement. No pancreatic ductal dilatation. Mild   infiltration of the fat adjacent to the uncinate process and at the root   of the small bowel mesentery.  ADRENALS: Within normal limits.  KIDNEYS/URETERS: No hydronephrosis. Non-obstructing bilateral renal   calculi measuring up to 4 mm in the left lower pole.    BLADDER: Within normal limits.  REPRODUCTIVE ORGANS: Hysterectomy.    BOWEL: No bowel obstruction. Appendix is normal.  PERITONEUM: No ascites.  VESSELS: Retroaortic left renal vein.  RETROPERITONEUM/LYMPH NODES: No lymphadenopathy.  ABDOMINAL WALL: Postsurgical changes. Small fat-containing supraumbilical   hernias.  BONES: Within normal limits.    IMPRESSION:  Mild infiltration of the fat adjacent to the pancreatic uncinate process   and at the root of the small bowel mesentery, nonspecific, acute   pancreatitis is not excluded. Correlate with lipase levels.        --- End of Report ---            JOCELYNE VILLAREAL MD; Attending Radiologist  This document has been electronically signed. Sep 27 2022 10:25AM    < end of copied text >     HPI: Pt is a 33 yo woman with stage 3 ovarian ca s/p LINCOLN/BSO in  currently on letrozole, migraines, and kidney stones who presents with 2 hours of sharp >10/10 epigastric pain. One month ago she developed constipation, talked to her surgeon (Magalys Fraire) who told her to take OTCs which have not helped. 5 days ago, she had 1 episode of sharp epigastric pain upon wakening that lasted 2 hours and resolved after taking a nap and medical marijuana. 2 days later she began to feel cramping in her back that felt similar to pain she had in the past while getting chemo. Yesterday she developed worsening back and abdominal cramps alongside fatigue; she attributed all of this to constipation. She took laxatives and naproxen and had a BM which did not help the pain. Last night she ate and went to sleep, cramps improved. She woke up this am with severe, sharp epigastric pain a/w nausea, no vomiting, causing her to come to ED where she had a watery BM that did not provide relief. Pain not a/w eating.     Patient states that she had mild chills throughout this time, though denies recent illness, subjective fever, vomiting, loss of appetite, alcohol use, cp, sob, urinary sxs. Reports pain is different from renal stone pain in the past as well as chemo-related acid reflux and not felt in esophagus.    ED course: VSS, afebrile. Received 4mg iv morphine, zofran, pepcid, 1L NS.   U/S showing Fatty infiltration of the liver and cholelithiasis, without evidence of acute cholecystitis or biliary ductal dilatation.  CT a/p showing Mild infiltration of the fat adjacent to the pancreatic uncinate process and at the root of the small bowel mesentery, nonspecific, acute pancreatitis is not excluded. Correlate with lipase levels.   (27 Sep 2022 16:08)    PMHX; Migraine, Vertigo, Asthma, Kidney stone, Stage IIIC Ovarian Serous carcinoma  PSHX; LINCOLN BSO, staging in Aug 2021  POBHX; G0  PGYNHX: hx of ovarian serous carcinoma,   SOCIAL: denies e/t/d use  Allergies: No Known Drug Allergies, Nuts (Headache), onions, kiwi- headache (Other), carboplatin (Muscle Pain; Flushing)  MEDS: melatonin 3 milliGRAM(s) Oral at bedtime PRN    Vital Signs Last 24 Hrs  T(C): 36.7 (27 Sep 2022 16:35), Max: 36.7 (27 Sep 2022 11:56)  T(F): 98.1 (27 Sep 2022 16:35), Max: 98.1 (27 Sep 2022 16:35)  HR: 92 (27 Sep 2022 16:35) (87 - 97)  BP: 123/69 (27 Sep 2022 16:35) (105/65 - 129/77)  RR: 16 (27 Sep 2022 16:35) (16 - 18)  SpO2: 97% (27 Sep 2022 16:35) (97% - 100%)    Parameters below as of 27 Sep 2022 16:35  Patient On (Oxygen Delivery Method): room air         PHYSICAL EXAM:  CHEST/LUNG: Clear to percussion bilaterally; No rales, rhonchi, wheezing, or rubs  HEART: Regular rate and rhythm; No murmurs, rubs, or gallops  ABDOMEN: Soft, TTP in epigastric/ subxyphoid region, nontender to palpation in lower abdomen, normal bowel sounds.   EXTREMITIES:  2+ Peripheral Pulses, No clubbing, cyanosis, or edema    LABS:                        11.9   9.34  )-----------( 285      ( 27 Sep 2022 07:00 )             38.2         140  |  102  |  14  ----------------------------<  104<H>  4.5   |  25  |  0.74    Ca    9.7      27 Sep 2022 07:00    TPro  8.4<H>  /  Alb  4.6  /  TBili  0.4  /  DBili  x   /  AST  34<H>  /  ALT  40<H>  /  AlkPhos  96      Urinalysis Basic - ( 27 Sep 2022 07:00 )    Color: Yellow / Appearance: Clear / S.025 / pH: x  Gluc: x / Ketone: Negative  / Bili: Negative / Urobili: <2 mg/dL   Blood: x / Protein: Trace / Nitrite: Negative   Leuk Esterase: Negative / RBC: 36 /HPF / WBC 7 /HPF   Sq Epi: x / Non Sq Epi: 4 /HPF / Bacteria: Negative        RADIOLOGY STUDIES:  < from: CT Abdomen and Pelvis w/ Oral Cont and w/ IV Cont (22 @ 10:10) >  ACC: [id number] EXAM:  CT ABDOMEN AND PELVIS OC IC                          PROCEDURE DATE:  2022          INTERPRETATION:  CLINICAL INFORMATION: Evaluate pancreatic abscess versus   mass. Nausea, vomiting. History of ovarian cancer.    COMPARISON: CT abdomen pelvis 2022.    CONTRAST/COMPLICATIONS:  IV Contrast: Omnipaque 350  70 cc administered   0 cc discarded  Oral Contrast: Omnipaque 300   Fruit 2o  Complications: None reported at time of study completion    PROCEDURE:  CT of the Abdomen and Pelvis was performed.  Arterial and Portal Venous phases were acquired.  Sagittal and coronal reformats were performed.    FINDINGS:  LOWER CHEST: Within normal limits.    LIVER: Steatosis.  BILE DUCTS: Normal caliber.  GALLBLADDER: Within normal limits.  SPLEEN: Within normal limits.  PANCREAS: Normal enhancement. No pancreatic ductal dilatation. Mild   infiltration of the fat adjacent to the uncinate process and at the root   of the small bowel mesentery.  ADRENALS: Within normal limits.  KIDNEYS/URETERS: No hydronephrosis. Non-obstructing bilateral renal   calculi measuring up to 4 mm in the left lower pole.    BLADDER: Within normal limits.  REPRODUCTIVE ORGANS: Hysterectomy.    BOWEL: No bowel obstruction. Appendix is normal.  PERITONEUM: No ascites.  VESSELS: Retroaortic left renal vein.  RETROPERITONEUM/LYMPH NODES: No lymphadenopathy.  ABDOMINAL WALL: Postsurgical changes. Small fat-containing supraumbilical   hernias.  BONES: Within normal limits.    IMPRESSION:  Mild infiltration of the fat adjacent to the pancreatic uncinate process   and at the root of the small bowel mesentery, nonspecific, acute   pancreatitis is not excluded. Correlate with lipase levels.        --- End of Report ---            JOCELYNE VILLAREAL MD; Attending Radiologist  This document has been electronically signed. Sep 27 2022 10:25AM    < end of copied text >     HPI: Pt is a 31 yo woman with Stage IIIC ovarian ca s/p LINCOLN/BSO in  currently on letrozole, migraines, and kidney stones who presents with 2 hours of sharp >10/10 epigastric pain. One month ago she developed constipation, talked to her surgeon (Magalys Fraire) who told her to take OTCs which have not helped. 5 days ago, she had 1 episode of sharp epigastric pain upon wakening that lasted 2 hours and resolved after taking a nap and medical marijuana. 2 days later she began to feel cramping in her back that felt similar to pain she had in the past while getting chemo. Yesterday she developed worsening back and abdominal cramps alongside fatigue; she attributed all of this to constipation. She took laxatives and naproxen and had a BM which did not help the pain. Last night she ate and went to sleep, cramps improved. She woke up this am with severe, sharp epigastric pain a/w nausea, no vomiting, causing her to come to ED where she had a watery BM that did not provide relief. Pain not a/w eating.     Patient states that she had mild chills throughout this time, though denies recent illness, subjective fever, vomiting, loss of appetite, alcohol use, cp, sob, urinary sxs. Reports pain is different from renal stone pain in the past as well as chemo-related acid reflux and not felt in esophagus.    ED course: VSS, afebrile. Received 4mg iv morphine, zofran, pepcid, 1L NS.   U/S showing Fatty infiltration of the liver and cholelithiasis, without evidence of acute cholecystitis or biliary ductal dilatation.  CT a/p showing Mild infiltration of the fat adjacent to the pancreatic uncinate process and at the root of the small bowel mesentery, nonspecific, acute pancreatitis is not excluded. Correlate with lipase levels.   (27 Sep 2022 16:08)    PMHX; Migraine, Vertigo, Asthma, Kidney stone, Stage IIIC Ovarian Serous carcinoma  PSHX; LINCOLN BSO, staging in Aug 2021  POBHX; G0  PGYNHX: hx of ovarian serous carcinoma,   SOCIAL: denies e/t/d use  Allergies: carboplatin (Muscle Pain; Flushing), Nuts (Headache), onions, kiwi- headache (Other)  MEDS: melatonin 3 milliGRAM(s) Oral at bedtime PRN    Vital Signs Last 24 Hrs  T(C): 36.7 (27 Sep 2022 16:35), Max: 36.7 (27 Sep 2022 11:56)  T(F): 98.1 (27 Sep 2022 16:35), Max: 98.1 (27 Sep 2022 16:35)  HR: 92 (27 Sep 2022 16:35) (87 - 97)  BP: 123/69 (27 Sep 2022 16:35) (105/65 - 129/77)  RR: 16 (27 Sep 2022 16:35) (16 - 18)  SpO2: 97% (27 Sep 2022 16:35) (97% - 100%)    Parameters below as of 27 Sep 2022 16:35  Patient On (Oxygen Delivery Method): room air         PHYSICAL EXAM:  CHEST/LUNG: Clear to percussion bilaterally; No rales, rhonchi, wheezing, or rubs  HEART: Regular rate and rhythm; No murmurs, rubs, or gallops  ABDOMEN: Soft, TTP in epigastric/ subxyphoid region, nontender to palpation in lower abdomen, normal bowel sounds.   EXTREMITIES:  2+ Peripheral Pulses, No clubbing, cyanosis, or edema    LABS:                        11.9   9.34  )-----------( 285      ( 27 Sep 2022 07:00 )             38.2         140  |  102  |  14  ----------------------------<  104<H>  4.5   |  25  |  0.74    Ca    9.7      27 Sep 2022 07:00    TPro  8.4<H>  /  Alb  4.6  /  TBili  0.4  /  DBili  x   /  AST  34<H>  /  ALT  40<H>  /  AlkPhos  96      Urinalysis Basic - ( 27 Sep 2022 07:00 )    Color: Yellow / Appearance: Clear / S.025 / pH: x  Gluc: x / Ketone: Negative  / Bili: Negative / Urobili: <2 mg/dL   Blood: x / Protein: Trace / Nitrite: Negative   Leuk Esterase: Negative / RBC: 36 /HPF / WBC 7 /HPF   Sq Epi: x / Non Sq Epi: 4 /HPF / Bacteria: Negative        RADIOLOGY STUDIES:    < from: US Abdomen Upper Quadrant Right (22 @ 07:39) >  ACC: [id number] EXAM:  US ABDOMEN RT UPR QUADRANT                          PROCEDURE DATE:  2022          INTERPRETATION:  CLINICAL INFORMATION: Fatty infiltration of liver.    COMPARISON: None available.    TECHNIQUE: Sonography of the right upper quadrant.    FINDINGS:  Liver: Within normal limits.  Bile ducts: Normal caliber. Common bile duct measures 4 mm.  Gallbladder: Cholelithiasis.  No focal tenderness or evidence of   cholecystitis.  Pancreas: Visualized portions are within normal limits.  Right kidney: 11.1 cm. No hydronephrosis.  Ascites: None.  IVC: Visualized portions are within normal limits.    IMPRESSION:  *  Fatty infiltration of the liver.  *  Cholelithiasis, without evidence of acute cholecystitis or biliary   ductal dilatation.        --- End of Report ---            STEVE OTTO MD; Attending Radiologist  This document has been electronically signed. Sep 27 2022  7:47AM    < end of copied text >    < from: CT Abdomen and Pelvis w/ Oral Cont and w/ IV Cont (22 @ 10:10) >  ACC: [id number] EXAM:  CT ABDOMEN AND PELVIS OC IC                          PROCEDURE DATE:  2022          INTERPRETATION:  CLINICAL INFORMATION: Evaluate pancreatic abscess versus   mass. Nausea, vomiting. History of ovarian cancer.    COMPARISON: CT abdomen pelvis 2022.    CONTRAST/COMPLICATIONS:  IV Contrast: Omnipaque 350  70 cc administered   0 cc discarded  Oral Contrast: Omnipaque 300   Fruit 2o  Complications: None reported at time of study completion    PROCEDURE:  CT of the Abdomen and Pelvis was performed.  Arterial and Portal Venous phases were acquired.  Sagittal and coronal reformats were performed.    FINDINGS:  LOWER CHEST: Within normal limits.    LIVER: Steatosis.  BILE DUCTS: Normal caliber.  GALLBLADDER: Within normal limits.  SPLEEN: Within normal limits.  PANCREAS: Normal enhancement. No pancreatic ductal dilatation. Mild   infiltration of the fat adjacent to the uncinate process and at the root   of the small bowel mesentery.  ADRENALS: Within normal limits.  KIDNEYS/URETERS: No hydronephrosis. Non-obstructing bilateral renal   calculi measuring up to 4 mm in the left lower pole.    BLADDER: Within normal limits.  REPRODUCTIVE ORGANS: Hysterectomy.    BOWEL: No bowel obstruction. Appendix is normal.  PERITONEUM: No ascites.  VESSELS: Retroaortic left renal vein.  RETROPERITONEUM/LYMPH NODES: No lymphadenopathy.  ABDOMINAL WALL: Postsurgical changes. Small fat-containing supraumbilical   hernias.  BONES: Within normal limits.    IMPRESSION:  Mild infiltration of the fat adjacent to the pancreatic uncinate process   and at the root of the small bowel mesentery, nonspecific, acute   pancreatitis is not excluded. Correlate with lipase levels.        --- End of Report ---            JOCELYNE VILLAREAL MD; Attending Radiologist  This document has been electronically signed. Sep 27 2022 10:25AM    < end of copied text >     HPI: Pt is a 31 yo woman with Stage IIIC ovarian cancer s/p LINCOLN/BSO in  and adjuvant chemotherapy, currently on letrozole, migraines, and kidney stones who presents with 2 hours of sharp >10/10 epigastric pain. One month ago she developed constipation, talked to her surgeon (Magalys Fraire) who told her to take OTCs which have not helped. 5 days ago, she had 1 episode of sharp epigastric pain upon wakening that lasted 2 hours and resolved after taking a nap and medical marijuana. 2 days later she began to feel cramping in her back that felt similar to pain she had in the past while getting chemo. Yesterday she developed worsening back and abdominal cramps alongside fatigue; she attributed all of this to constipation. She took laxatives and naproxen and had a BM which did not help the pain. Last night she ate and went to sleep, cramps improved. She woke up this am with severe, sharp epigastric pain a/w nausea, no vomiting, causing her to come to ED where she had a watery BM that did not provide relief. Pain not a/w eating.     Patient states that she had mild chills throughout this time, though denies recent illness, subjective fever, vomiting, loss of appetite, alcohol use, cp, sob, urinary sxs. Reports pain is different from renal stone pain in the past as well as chemo-related acid reflux and not felt in esophagus.    ED course: VSS, afebrile. Received 4mg iv morphine, zofran, pepcid, 1L NS.   U/S showing Fatty infiltration of the liver and cholelithiasis, without evidence of acute cholecystitis or biliary ductal dilatation.  CT a/p showing Mild infiltration of the fat adjacent to the pancreatic uncinate process and at the root of the small bowel mesentery, nonspecific, acute pancreatitis is not excluded. Correlate with lipase levels.   (27 Sep 2022 16:08)    PMHX; Migraine, Vertigo, Asthma, Kidney stone, Stage IIIC Ovarian Serous carcinoma  PSHX; LINCOLN BSO, staging in Aug 2021  POBHX; G0  PGYNHX: hx of ovarian serous carcinoma,   SOCIAL: denies e/t/d use  Allergies: carboplatin (Muscle Pain; Flushing), Nuts (Headache), onions, kiwi- headache (Other)  MEDS: melatonin 3 milliGRAM(s) Oral at bedtime PRN    Vital Signs Last 24 Hrs  T(C): 36.7 (27 Sep 2022 16:35), Max: 36.7 (27 Sep 2022 11:56)  T(F): 98.1 (27 Sep 2022 16:35), Max: 98.1 (27 Sep 2022 16:35)  HR: 92 (27 Sep 2022 16:35) (87 - 97)  BP: 123/69 (27 Sep 2022 16:35) (105/65 - 129/77)  RR: 16 (27 Sep 2022 16:35) (16 - 18)  SpO2: 97% (27 Sep 2022 16:35) (97% - 100%)    Parameters below as of 27 Sep 2022 16:35  Patient On (Oxygen Delivery Method): room air         PHYSICAL EXAM:  CHEST/LUNG: Clear to percussion bilaterally; No rales, rhonchi, wheezing, or rubs  HEART: Regular rate and rhythm; No murmurs, rubs, or gallops  ABDOMEN: Soft, TTP in epigastric/ subxyphoid region, nontender to palpation in lower abdomen, normal bowel sounds.   EXTREMITIES:  2+ Peripheral Pulses, No clubbing, cyanosis, or edema    LABS:                        11.9   9.34  )-----------( 285      ( 27 Sep 2022 07:00 )             38.2         140  |  102  |  14  ----------------------------<  104<H>  4.5   |  25  |  0.74    Ca    9.7      27 Sep 2022 07:00    TPro  8.4<H>  /  Alb  4.6  /  TBili  0.4  /  DBili  x   /  AST  34<H>  /  ALT  40<H>  /  AlkPhos  96      Urinalysis Basic - ( 27 Sep 2022 07:00 )    Color: Yellow / Appearance: Clear / S.025 / pH: x  Gluc: x / Ketone: Negative  / Bili: Negative / Urobili: <2 mg/dL   Blood: x / Protein: Trace / Nitrite: Negative   Leuk Esterase: Negative / RBC: 36 /HPF / WBC 7 /HPF   Sq Epi: x / Non Sq Epi: 4 /HPF / Bacteria: Negative        RADIOLOGY STUDIES:    < from: US Abdomen Upper Quadrant Right (22 @ 07:39) >  ACC: [id number] EXAM:  US ABDOMEN RT UPR QUADRANT                          PROCEDURE DATE:  2022          INTERPRETATION:  CLINICAL INFORMATION: Fatty infiltration of liver.    COMPARISON: None available.    TECHNIQUE: Sonography of the right upper quadrant.    FINDINGS:  Liver: Within normal limits.  Bile ducts: Normal caliber. Common bile duct measures 4 mm.  Gallbladder: Cholelithiasis.  No focal tenderness or evidence of   cholecystitis.  Pancreas: Visualized portions are within normal limits.  Right kidney: 11.1 cm. No hydronephrosis.  Ascites: None.  IVC: Visualized portions are within normal limits.    IMPRESSION:  *  Fatty infiltration of the liver.  *  Cholelithiasis, without evidence of acute cholecystitis or biliary   ductal dilatation.        --- End of Report ---            STEVE OTTO MD; Attending Radiologist  This document has been electronically signed. Sep 27 2022  7:47AM    < end of copied text >    < from: CT Abdomen and Pelvis w/ Oral Cont and w/ IV Cont (22 @ 10:10) >  ACC: [id number] EXAM:  CT ABDOMEN AND PELVIS OC IC                          PROCEDURE DATE:  2022          INTERPRETATION:  CLINICAL INFORMATION: Evaluate pancreatic abscess versus   mass. Nausea, vomiting. History of ovarian cancer.    COMPARISON: CT abdomen pelvis 2022.    CONTRAST/COMPLICATIONS:  IV Contrast: Omnipaque 350  70 cc administered   0 cc discarded  Oral Contrast: Omnipaque 300   Fruit 2o  Complications: None reported at time of study completion    PROCEDURE:  CT of the Abdomen and Pelvis was performed.  Arterial and Portal Venous phases were acquired.  Sagittal and coronal reformats were performed.    FINDINGS:  LOWER CHEST: Within normal limits.    LIVER: Steatosis.  BILE DUCTS: Normal caliber.  GALLBLADDER: Within normal limits.  SPLEEN: Within normal limits.  PANCREAS: Normal enhancement. No pancreatic ductal dilatation. Mild   infiltration of the fat adjacent to the uncinate process and at the root   of the small bowel mesentery.  ADRENALS: Within normal limits.  KIDNEYS/URETERS: No hydronephrosis. Non-obstructing bilateral renal   calculi measuring up to 4 mm in the left lower pole.    BLADDER: Within normal limits.  REPRODUCTIVE ORGANS: Hysterectomy.    BOWEL: No bowel obstruction. Appendix is normal.  PERITONEUM: No ascites.  VESSELS: Retroaortic left renal vein.  RETROPERITONEUM/LYMPH NODES: No lymphadenopathy.  ABDOMINAL WALL: Postsurgical changes. Small fat-containing supraumbilical   hernias.  BONES: Within normal limits.    IMPRESSION:  Mild infiltration of the fat adjacent to the pancreatic uncinate process   and at the root of the small bowel mesentery, nonspecific, acute   pancreatitis is not excluded. Correlate with lipase levels.        --- End of Report ---            JOCELYNE VILLAREAL MD; Attending Radiologist  This document has been electronically signed. Sep 27 2022 10:25AM    < end of copied text >     HPI: Pt is a 31 yo woman with Stage IIIC ovarian cancer s/p LINCOLN/BSO in  and adjuvant chemotherapy, currently on letrozole, migraines, and kidney stones who presents with 2 hours of sharp >10/10 epigastric pain. One month ago she developed constipation, talked to her surgeon (Magalys Fraire) who told her to take OTCs which have not helped. 5 days ago, she had 1 episode of sharp epigastric pain upon wakening that lasted 2 hours and resolved after taking a nap and medical marijuana. 2 days later she began to feel cramping in her back that felt similar to pain she had in the past while getting chemo. Yesterday she developed worsening back and abdominal cramps alongside fatigue; she attributed all of this to constipation. She took laxatives and naproxen and had a BM which did not help the pain. Last night she ate and went to sleep, cramps improved. She woke up this am with severe, sharp epigastric pain a/w nausea, no vomiting, causing her to come to ED where she had a watery BM that did not provide relief. Pain not a/w eating.     Patient states that she had mild chills throughout this time, though denies recent illness, subjective fever, vomiting, loss of appetite, alcohol use, cp, sob, urinary sxs. Reports pain is different from renal stone pain in the past as well as chemo-related acid reflux and not felt in esophagus.    ED course: VSS, afebrile. Received 4mg iv morphine, zofran, pepcid, 1L NS.     PMHX; Migraine, Vertigo, Asthma, Kidney stone, Stage IIIC Ovarian Serous carcinoma  PSHX; LINCOLN BSO, staging in Aug 2021  POBHX; G0  PGYNHX: hx of ovarian serous carcinoma,   SOCIAL: denies e/t/d use  Allergies: carboplatin (Muscle Pain; Flushing), Nuts (Headache), onions, kiwi- headache (Other)  MEDS: melatonin 3 milliGRAM(s) Oral at bedtime PRN, Letrozole    Vital Signs Last 24 Hrs  T(C): 36.7 (27 Sep 2022 16:35), Max: 36.7 (27 Sep 2022 11:56)  T(F): 98.1 (27 Sep 2022 16:35), Max: 98.1 (27 Sep 2022 16:35)  HR: 92 (27 Sep 2022 16:35) (87 - 97)  BP: 123/69 (27 Sep 2022 16:35) (105/65 - 129/77)  RR: 16 (27 Sep 2022 16:35) (16 - 18)  SpO2: 97% (27 Sep 2022 16:35) (97% - 100%)    Parameters below as of 27 Sep 2022 16:35  Patient On (Oxygen Delivery Method): room air         PHYSICAL EXAM:  CHEST/LUNG: Clear to percussion bilaterally; No rales, rhonchi, wheezing, or rubs  HEART: Regular rate and rhythm; No murmurs, rubs, or gallops  ABDOMEN: Soft, TTP in epigastric/ subxyphoid region, nontender to palpation in lower abdomen, normal bowel sounds.   EXTREMITIES:  2+ Peripheral Pulses, No clubbing, cyanosis, or edema    LABS:                        11.9   9.34  )-----------( 285      ( 27 Sep 2022 07:00 )             38.2         140  |  102  |  14  ----------------------------<  104<H>  4.5   |  25  |  0.74    Ca    9.7      27 Sep 2022 07:00    TPro  8.4<H>  /  Alb  4.6  /  TBili  0.4  /  DBili  x   /  AST  34<H>  /  ALT  40<H>  /  AlkPhos  96      Urinalysis Basic - ( 27 Sep 2022 07:00 )    Color: Yellow / Appearance: Clear / S.025 / pH: x  Gluc: x / Ketone: Negative  / Bili: Negative / Urobili: <2 mg/dL   Blood: x / Protein: Trace / Nitrite: Negative   Leuk Esterase: Negative / RBC: 36 /HPF / WBC 7 /HPF   Sq Epi: x / Non Sq Epi: 4 /HPF / Bacteria: Negative        RADIOLOGY STUDIES:    < from: US Abdomen Upper Quadrant Right (22 @ 07:39) >  ACC: [id number] EXAM:  US ABDOMEN RT UPR QUADRANT                          PROCEDURE DATE:  2022          INTERPRETATION:  CLINICAL INFORMATION: Fatty infiltration of liver.    COMPARISON: None available.    TECHNIQUE: Sonography of the right upper quadrant.    FINDINGS:  Liver: Within normal limits.  Bile ducts: Normal caliber. Common bile duct measures 4 mm.  Gallbladder: Cholelithiasis.  No focal tenderness or evidence of   cholecystitis.  Pancreas: Visualized portions are within normal limits.  Right kidney: 11.1 cm. No hydronephrosis.  Ascites: None.  IVC: Visualized portions are within normal limits.    IMPRESSION:  *  Fatty infiltration of the liver.  *  Cholelithiasis, without evidence of acute cholecystitis or biliary   ductal dilatation.        --- End of Report ---            STEVE OTTO MD; Attending Radiologist  This document has been electronically signed. Sep 27 2022  7:47AM    < end of copied text >    < from: CT Abdomen and Pelvis w/ Oral Cont and w/ IV Cont (22 @ 10:10) >  ACC: [id number] EXAM:  CT ABDOMEN AND PELVIS OC IC                          PROCEDURE DATE:  2022          INTERPRETATION:  CLINICAL INFORMATION: Evaluate pancreatic abscess versus   mass. Nausea, vomiting. History of ovarian cancer.    COMPARISON: CT abdomen pelvis 2022.    CONTRAST/COMPLICATIONS:  IV Contrast: Omnipaque 350  70 cc administered   0 cc discarded  Oral Contrast: Omnipaque 300   Fruit 2o  Complications: None reported at time of study completion    PROCEDURE:  CT of the Abdomen and Pelvis was performed.  Arterial and Portal Venous phases were acquired.  Sagittal and coronal reformats were performed.    FINDINGS:  LOWER CHEST: Within normal limits.    LIVER: Steatosis.  BILE DUCTS: Normal caliber.  GALLBLADDER: Within normal limits.  SPLEEN: Within normal limits.  PANCREAS: Normal enhancement. No pancreatic ductal dilatation. Mild   infiltration of the fat adjacent to the uncinate process and at the root   of the small bowel mesentery.  ADRENALS: Within normal limits.  KIDNEYS/URETERS: No hydronephrosis. Non-obstructing bilateral renal   calculi measuring up to 4 mm in the left lower pole.    BLADDER: Within normal limits.  REPRODUCTIVE ORGANS: Hysterectomy.    BOWEL: No bowel obstruction. Appendix is normal.  PERITONEUM: No ascites.  VESSELS: Retroaortic left renal vein.  RETROPERITONEUM/LYMPH NODES: No lymphadenopathy.  ABDOMINAL WALL: Postsurgical changes. Small fat-containing supraumbilical   hernias.  BONES: Within normal limits.    IMPRESSION:  Mild infiltration of the fat adjacent to the pancreatic uncinate process   and at the root of the small bowel mesentery, nonspecific, acute   pancreatitis is not excluded. Correlate with lipase levels.        --- End of Report ---            JOCELYNE VILLAREAL MD; Attending Radiologist  This document has been electronically signed. Sep 27 2022 10:25AM    < end of copied text >

## 2022-09-27 NOTE — CONSULT NOTE ADULT - SUBJECTIVE AND OBJECTIVE BOX
CC: 32y old Female admitted with a chief complaint of abdominal pain , now    HPI: 32F with stage 3 ovarian CA, s/p LINCOLN BSO 1 year ago, chemo finished 2021, now on letrozole and being closely monitored by her Onc, history of hemorrhoids and constipation, presenting with epigastric and RUQ pain. The pain started a few days ago but was mild and not affecting her appetite. Last night the pain increased to a 10/10, in the epigastric region radiating to the back, associated with nausea but no vomiting. She took laxatives and stool softeners as she thought the pain may be associated with constipation, however patient passed watery stool this morning and did not have relief of pain. Last CT scan was in May 2022 and was told a small nodule that they will continue to monitor but has otherwise been recovering well from cancer surgery and treatment. She has never had pain like this before and does not endorse colicky pain after eating fatty meals.      PMHx: Migraine    Vertigo    Asthma    Kidney stone    Ovarian carcinoma      PSHx: No significant past surgical history    History of surgery      Medications (inpatient):   Medications (PRN):  Allergies: No Known Drug Allergies  Nuts (Headache)  onions, kiwi- headache (Other)  (Intolerances: )  Social Hx:   Family Hx: FH: HTN (hypertension) (Father, Mother)    FH: sickle cell anemia (Sibling)        Physical Exam  T(C): 36.7  HR: 87 (87 - 97)  BP: 109/69 (105/65 - 129/77)  RR: 16 (16 - 18)  SpO2: 99% (99% - 100%)  Tmax: T(C): , Max: 36.7 (22 @ 11:56)    General: well developed, well nourished, NAD  Neuro: alert and oriented, no focal deficits, moves all extremities spontaneously  HEENT: NCAT, EOMI, anicteric, mucosa moist  Respiratory: airway patent, respirations unlabored  CVS: regular rate and rhythm  Abdomen: soft, nondistended, well healed lower midline incision, tender in epigastrium and RUQ, worse in epigastrium, negative murphys  Extremities: no edema, sensation and movement grossly intact  Skin: warm, dry, appropriate color    Labs:                        11.9   9.34  )-----------( 285      ( 27 Sep 2022 07:00 )             38.2           140  |  102  |  14  ----------------------------<  104<H>  4.5   |  25  |  0.74    Ca    9.7      27 Sep 2022 07:00    TPro  8.4<H>  /  Alb  4.6  /  TBili  0.4  /  DBili  x   /  AST  34<H>  /  ALT  40<H>  /  AlkPhos  96      Urinalysis Basic - ( 27 Sep 2022 07:00 )    Color: Yellow / Appearance: Clear / S.025 / pH: x  Gluc: x / Ketone: Negative  / Bili: Negative / Urobili: <2 mg/dL   Blood: x / Protein: Trace / Nitrite: Negative   Leuk Esterase: Negative / RBC: 36 /HPF / WBC 7 /HPF   Sq Epi: x / Non Sq Epi: 4 /HPF / Bacteria: Negative            Imaging and other studies:    < from: US Abdomen Upper Quadrant Right (22 @ 07:39) >  FINDINGS:  Liver: Within normal limits.  Bile ducts: Normal caliber. Common bile duct measures 4 mm.  Gallbladder: Cholelithiasis.  No focal tenderness or evidence of   cholecystitis.  Pancreas: Visualized portions are within normal limits.  Right kidney: 11.1 cm. No hydronephrosis.  Ascites: None.  IVC: Visualized portions are within normal limits.    IMPRESSION:  *  Fatty infiltration of the liver.  *  Cholelithiasis, without evidence of acute cholecystitis or biliary   ductal dilatation.    < end of copied text >      < from: CT Abdomen and Pelvis w/ Oral Cont and w/ IV Cont (22 @ 10:10) >  FINDINGS:  LOWER CHEST: Within normal limits.    LIVER: Steatosis.  BILE DUCTS: Normal caliber.  GALLBLADDER: Within normal limits.  SPLEEN: Within normal limits.  PANCREAS: Normal enhancement. No pancreatic ductal dilatation. Mild   infiltration of the fat adjacent to the uncinate process and at the root   of the small bowel mesentery.  ADRENALS: Within normal limits.  KIDNEYS/URETERS: No hydronephrosis. Nonobstructing bilateral renal   calculi measuring up to 4 mm in the left lower pole.    BLADDER: Within normal limits.  REPRODUCTIVE ORGANS: Hysterectomy.    BOWEL: No bowel obstruction. Appendix is normal.  PERITONEUM: No ascites.  VESSELS: Retroaortic left renal vein.  RETROPERITONEUM/LYMPH NODES: No lymphadenopathy.  ABDOMINAL WALL: Postsurgical changes. Small fat-containing supraumbilical   hernias.  BONES: Within normal limits.    IMPRESSION:  Mild infiltration of the fat adjacent to the pancreatic uncinate process   and at the root of the small bowel mesentery, nonspecific, acute   pancreatitis is not excluded. Correlate with lipase levels.    < end of copied text >

## 2022-09-27 NOTE — ED PROVIDER NOTE - CLINICAL SUMMARY MEDICAL DECISION MAKING FREE TEXT BOX
33 yo F with ovarian CA that is being closely monitored by her Onc, history of hemorrhoids and constipation that presents with epigastric and rUQ pain tonight. Pt has RUQ and epigastric pain on palpation. Given her history, concern for pancreatitis vs gallstones vs PUD vs cancer. Will obtain labs and imaging and provide meds for pain and symptoms control and reassess.

## 2022-09-27 NOTE — CONSULT NOTE ADULT - ATTENDING COMMENTS
Patient with pancreatitis in setting of ovarian cancer s/p treatment. Patient on ct scan found to have cholelithiasis. Possibility of gallstone pancreatitis, however would like further imaging including MRCP to evaluate ductal anatomy.  No acute intervention required  monitor lfts  gi evaluation  continue to follow  I have reviewed the history, pertinent labs and imaging, and discussed the care with the consult resident.    The active issues are:  1. r/o biliary pancreatitis    The Acute Care Surgery (B Team) Attending Group Practice:  Dr. Jan Miranda, Dr. Devan Henderson, Dr. Stanilsav Nguyen, Dr. Cam Lopez,     urgent issues - spectra [phone]  nonurgent issues - [phone]  patient appointments or afterhours - [phone]
patient seen and evaluated  some upper abdominal pain, no rebound  per surgery will plan for MRCP given cholelithiasis  no evidence of disease recurrence on CT scan

## 2022-09-27 NOTE — ED PROVIDER NOTE - MUSCULOSKELETAL, MLM
Chico Cooper(Resident) Spine appears normal, range of motion is not limited, no muscle or joint tenderness

## 2022-09-27 NOTE — ED PROVIDER NOTE - OBJECTIVE STATEMENT
33 yo F with ovarian CA that is being closely monitored by her Onc, history of hemorrhoids and constipation that presents with epigastric and rUQ pain tonight that has been worsening last few days pain is 10/10, associated with back pain, nausea but no vomiting, chills but not related to eating, woke her up from sleep tonight, called her Oncologist and the on call dr told pt to come to ED. Pt thought it was related to BMs but had normal one and no relief. Pt reports that she has had bloody stools which she thinks is related to her hemorrhoids. Last CT scan was in May 2022 and was told a small nodule that they will continue to monitor but pt is s/p LINCOLN/BSO for her CA was previously on chemo.   Non smoker. no drugs.

## 2022-09-27 NOTE — H&P ADULT - NSHPPHYSICALEXAM_GEN_ALL_CORE
VITALS:   T(C): 36.7 (09-27-22 @ 16:35), Max: 36.7 (09-27-22 @ 11:56)  HR: 92 (09-27-22 @ 16:35) (87 - 97)  BP: 123/69 (09-27-22 @ 16:35) (105/65 - 129/77)  RR: 16 (09-27-22 @ 16:35) (16 - 18)  SpO2: 97% (09-27-22 @ 16:35) (97% - 100%)    GENERAL: NAD, lying in bed comfortably  HEAD:  Atraumatic, normocephalic  EYES: EOMI, PERRLA, conjunctiva and sclera clear  ENT: Moist mucous membranes, no pharyngeal erythema, no thrush  HEART: Regular rate and rhythm, no murmurs, rubs, or gallops  LUNGS: Unlabored respirations.  Clear to auscultation bilaterally, no crackles, wheezing, or rhonchi  ABDOMEN: +mild TTP in midepigastrium, +TTP in RUQ, no rebound/guarding, nondistended, +BS  EXTREMITIES: 2+ peripheral pulses bilaterally. +1 pitting edema in both LEs to ankles, pt reports this is baseline and due to her ca treatment   NERVOUS SYSTEM:  A&Ox3, no focal deficits   SKIN: No rashes or lesions

## 2022-09-27 NOTE — CONSULT NOTE ADULT - ASSESSMENT
32F hx ovarian cancer s/p LINCOLN BSO and chemotherapy, presenting with epigastric abdominal pain, imaging consistent with cholelithiases and pancreatitis, however lipase and lfts wnl. Low suspicion for cholecystitis given normal WBC and no signs of inflammation on imaging.    -no acute surgical intervention  -continue workup for ?pancreatitis etiology, drug induced vs. gallstone panc  -recc MRCP to rule out gallstone panc, bile ducts wnl on imaging thus far  -trend lipase and LFTs  -serial abdominal exams  -pain control  -IV hydration  -will follow    Discussed with attending Dr. John Rangel PGY3  B team surgery  [id number]

## 2022-09-27 NOTE — ED ADULT NURSE NOTE - OBJECTIVE STATEMENT
31 y/o female, a&ox4, received to intake rm 9. Pt c/o 9/10 epigastric pain starting earlier this morning while sleeping, non-radiating. Denies CP, SOB, or dyspnea at this time. PMH of ovarian cancer, last chemo trx 12/21. Respirations are even and unlabored, no signs of respiratory distress. 20GIV placed to LAC, labs collected and sent off. Pt medicated as per MD orders.

## 2022-09-27 NOTE — H&P ADULT - PROBLEM SELECTOR PLAN 1
Epigastric and RUQ pain, severe this am waking from sleep. Imaging showing gallstones with no cholecystitis, possible fat stranding around pancreas however negative lipase makes pancreatitis unlikely. WBC normal.   - surgery consulted, fu recs  - MRCP   - trend lipase, liver enzymes  - pain control prn   - maintenance fluids Epigastric and RUQ pain, severe this am waking from sleep. Imaging showing gallstones with no cholecystitis, possible fat stranding around pancreas however negative lipase makes pancreatitis unlikely. WBC normal.   - Concern for gallstone pancreatitis   - surgery consulted, fu recs  - MRCP   - trend lipase, liver enzymes  - pain control prn   - maintenance fluids

## 2022-09-27 NOTE — H&P ADULT - NSHPREVIEWOFSYSTEMS_GEN_ALL_CORE
REVIEW OF SYSTEMS:  CONSTITUTIONAL: +weakness, fevers or chills  EYES/ENT: No visual changes;  hx of vertigo but none now, no throat pain   NECK: No pain or stiffness  RESPIRATORY: No cough, wheezing, hemoptysis; No shortness of breath  CARDIOVASCULAR: No chest pain or palpitations  GASTROINTESTINAL: +10/10 epigastric pain radiating to RUQ.  +nausea; no vomiting, or hematemesis; +constipation. No melena or hematochezia.  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  +nausea; no vomiting, or hematemesis; +constipation. No melena or hematochezia.  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

## 2022-09-27 NOTE — ED ADULT NURSE NOTE - NS PRO PASSIVE SMOKE EXP
January 6, 2020      Muriel Porras  [address] [facility] [address]        To Whom It May Concern:    Muriel Porras was seen in the clinic today. Please excuse her from work for a couple of days due to illness.    Return to work: 1/8/2020 if feeling better    Please contact me if you have any additional questions or concerns.      Sincerely,        Rosa Elena Holguin MD     No

## 2022-09-27 NOTE — H&P ADULT - NSHPLABSRESULTS_GEN_ALL_CORE
11.9   9.34  )-----------( 285      ( 27 Sep 2022 07:00 )             38.2         140  |  102  |  14  ----------------------------<  104<H>  4.5   |  25  |  0.74    Ca    9.7      27 Sep 2022 07:00    TPro  8.4<H>  /  Alb  4.6  /  TBili  0.4  /  DBili  x   /  AST  34<H>  /  ALT  40<H>  /  AlkPhos  96          LIVER FUNCTIONS - ( 27 Sep 2022 07:00 )  Alb: 4.6 g/dL / Pro: 8.4 g/dL / ALK PHOS: 96 U/L / ALT: 40 U/L / AST: 34 U/L / GGT: x             Urinalysis Basic - ( 27 Sep 2022 07:00 )    Color: Yellow / Appearance: Clear / S.025 / pH: x  Gluc: x / Ketone: Negative  / Bili: Negative / Urobili: <2 mg/dL   Blood: x / Protein: Trace / Nitrite: Negative   Leuk Esterase: Negative / RBC: 36 /HPF / WBC 7 /HPF   Sq Epi: x / Non Sq Epi: 4 /HPF / Bacteria: Negative      Blood, Urine: Moderate ( @ 07:00)        EKG:     RADIOLOGY STUDIES:

## 2022-09-27 NOTE — H&P ADULT - ATTENDING COMMENTS
32F hx of stage 3 ovarian ca s/p LINCOLN/BSO and chemo currently on letrozole, presenting with epigastric pain x5 days. States pain started on Thursday, described as epigastric, sharp, non-radiating to back, constant. Pain was not associated with eating but was associated with nausea, no vomiting. Has never happened to her before. Denies fevers. Denies ETOH intake. Took Aleve without relief. Pain subsided over the weekend and then woke her up out of sleep at 4am, prompting her to come to ED.     VSS  Obese female in NAD. Lungs CTAB. Abd +epigastric pain and +Stock's sign. Bowel sounds present. No LE edema.     Labs reviewed and largely unremarkable aside from very slightly elevated AST/ALT. Lipase WNL.     Radiology reviewed, Abd US showing cholelithiasis, without evidence of acute cholecystitis or biliary ductal dilatation. CT showing mild infiltration of the fat adjacent to the pancreatic uncinate process   and at the root of the small bowel mesentery, nonspecific, acute   pancreatitis is not excluded.     #Epigastric pain   - Could be due to cholelithiasis seen on Abd US vs. pancreatitis   - Lipase is not elevated   - No evidence of acute poncho   - Monitor off antibiotics  - Seen by surgery, MRCP recommended, pending   - Will place on CLD for now and IVF   - Pain control     Rest of plan as above  Discussed plan with patient 32F hx of stage 3 ovarian ca s/p LINCOLN/BSO and chemo currently on letrozole, presenting with epigastric pain x5 days. States pain started on Thursday, described as epigastric, sharp, non-radiating to back, constant. Pain was not associated with eating but was associated with nausea, no vomiting. Has never happened to her before. Denies fevers. Denies ETOH intake. Took Aleve without relief. Pain subsided over the weekend and then woke her up out of sleep at 4am, prompting her to come to ED.     VSS  Obese female in NAD. Lungs CTAB. Abd +epigastric pain and +Stock's sign. Bowel sounds present. No LE edema.     Labs reviewed and largely unremarkable aside from very slightly elevated AST/ALT. Lipase WNL.     Radiology reviewed, Abd US showing cholelithiasis, without evidence of acute cholecystitis or biliary ductal dilatation. CT showing mild infiltration of the fat adjacent to the pancreatic uncinate process   and at the root of the small bowel mesentery, nonspecific, acute   pancreatitis is not excluded.     #Epigastric pain   - Could be due to gallstone pancreatitis given pancreatic fat stranding seen on CT and gallstones seen on Abd US. ?passed gallstone as lipase is not elevated   - No evidence of acute poncho   - Monitor off antibiotics  - Seen by surgery, MRCP recommended, pending   - Will place on CLD for now and IVF   - Pain control     Rest of plan as above  Discussed plan with patient 32F hx of stage 3 ovarian ca s/p LINCOLN/BSO and chemo currently on letrozole, presenting with epigastric pain x5 days. States pain started on Thursday, described as epigastric, sharp, non-radiating to back, constant. Pain was not associated with eating but was associated with nausea, no vomiting. Has never happened to her before. Denies fevers. Denies ETOH intake. Took Aleve without relief. Pain subsided over the weekend and then woke her up out of sleep at 4am, prompting her to come to ED.     VSS  Obese female in NAD. Lungs CTAB. Abd +epigastric pain and +Stock's sign. Bowel sounds present. No LE edema.     Labs reviewed and largely unremarkable aside from very slightly elevated AST/ALT. Lipase WNL.     Radiology reviewed, Abd US showing cholelithiasis, without evidence of acute cholecystitis or biliary ductal dilatation. CT showing mild infiltration of the fat adjacent to the pancreatic uncinate process   and at the root of the small bowel mesentery, nonspecific, acute   pancreatitis is not excluded.     #Epigastric pain   - Could be due to gallstone pancreatitis given pancreatic fat stranding seen on CT and gallstones seen on Abd US. ?passed gallstone as lipase is not elevated   - No evidence of acute poncho   - Monitor off antibiotics  - Seen by surgery, MRCP recommended, pending   - Will place on CLD for now and IVF   - Pain control w/ morphine     Rest of plan as above  Discussed plan with patient

## 2022-09-28 LAB
A1C WITH ESTIMATED AVERAGE GLUCOSE RESULT: 6 % — HIGH (ref 4–5.6)
ALBUMIN SERPL ELPH-MCNC: 4.2 G/DL — SIGNIFICANT CHANGE UP (ref 3.3–5)
ALP SERPL-CCNC: 81 U/L — SIGNIFICANT CHANGE UP (ref 40–120)
ALT FLD-CCNC: 40 U/L — HIGH (ref 4–33)
ANION GAP SERPL CALC-SCNC: 11 MMOL/L — SIGNIFICANT CHANGE UP (ref 7–14)
APTT BLD: 33.3 SEC — SIGNIFICANT CHANGE UP (ref 27–36.3)
AST SERPL-CCNC: 37 U/L — HIGH (ref 4–32)
BASOPHILS # BLD AUTO: 0.04 K/UL — SIGNIFICANT CHANGE UP (ref 0–0.2)
BASOPHILS NFR BLD AUTO: 0.6 % — SIGNIFICANT CHANGE UP (ref 0–2)
BILIRUB SERPL-MCNC: 0.4 MG/DL — SIGNIFICANT CHANGE UP (ref 0.2–1.2)
BUN SERPL-MCNC: 9 MG/DL — SIGNIFICANT CHANGE UP (ref 7–23)
CALCIUM SERPL-MCNC: 10 MG/DL — SIGNIFICANT CHANGE UP (ref 8.4–10.5)
CHLORIDE SERPL-SCNC: 101 MMOL/L — SIGNIFICANT CHANGE UP (ref 98–107)
CHOLEST SERPL-MCNC: 153 MG/DL — SIGNIFICANT CHANGE UP
CO2 SERPL-SCNC: 27 MMOL/L — SIGNIFICANT CHANGE UP (ref 22–31)
CREAT SERPL-MCNC: 0.71 MG/DL — SIGNIFICANT CHANGE UP (ref 0.5–1.3)
CULTURE RESULTS: SIGNIFICANT CHANGE UP
EGFR: 117 ML/MIN/1.73M2 — SIGNIFICANT CHANGE UP
EOSINOPHIL # BLD AUTO: 0.09 K/UL — SIGNIFICANT CHANGE UP (ref 0–0.5)
EOSINOPHIL NFR BLD AUTO: 1.4 % — SIGNIFICANT CHANGE UP (ref 0–6)
ESTIMATED AVERAGE GLUCOSE: 126 — SIGNIFICANT CHANGE UP
GLUCOSE SERPL-MCNC: 85 MG/DL — SIGNIFICANT CHANGE UP (ref 70–99)
HCT VFR BLD CALC: 35.2 % — SIGNIFICANT CHANGE UP (ref 34.5–45)
HDLC SERPL-MCNC: 37 MG/DL — LOW
HGB BLD-MCNC: 11.2 G/DL — LOW (ref 11.5–15.5)
IANC: 2.76 K/UL — SIGNIFICANT CHANGE UP (ref 1.8–7.4)
IMM GRANULOCYTES NFR BLD AUTO: 0.3 % — SIGNIFICANT CHANGE UP (ref 0–0.9)
LIPID PNL WITH DIRECT LDL SERPL: 87 MG/DL — SIGNIFICANT CHANGE UP
LYMPHOCYTES # BLD AUTO: 3.12 K/UL — SIGNIFICANT CHANGE UP (ref 1–3.3)
LYMPHOCYTES # BLD AUTO: 48.1 % — HIGH (ref 13–44)
MAGNESIUM SERPL-MCNC: 1.7 MG/DL — SIGNIFICANT CHANGE UP (ref 1.6–2.6)
MCHC RBC-ENTMCNC: 27.5 PG — SIGNIFICANT CHANGE UP (ref 27–34)
MCHC RBC-ENTMCNC: 31.8 GM/DL — LOW (ref 32–36)
MCV RBC AUTO: 86.3 FL — SIGNIFICANT CHANGE UP (ref 80–100)
MONOCYTES # BLD AUTO: 0.45 K/UL — SIGNIFICANT CHANGE UP (ref 0–0.9)
MONOCYTES NFR BLD AUTO: 6.9 % — SIGNIFICANT CHANGE UP (ref 2–14)
NEUTROPHILS # BLD AUTO: 2.76 K/UL — SIGNIFICANT CHANGE UP (ref 1.8–7.4)
NEUTROPHILS NFR BLD AUTO: 42.7 % — LOW (ref 43–77)
NON HDL CHOLESTEROL: 116 MG/DL — SIGNIFICANT CHANGE UP
NRBC # BLD: 0 /100 WBCS — SIGNIFICANT CHANGE UP (ref 0–0)
NRBC # FLD: 0 K/UL — SIGNIFICANT CHANGE UP (ref 0–0)
PHOSPHATE SERPL-MCNC: 5.1 MG/DL — HIGH (ref 2.5–4.5)
PLATELET # BLD AUTO: 257 K/UL — SIGNIFICANT CHANGE UP (ref 150–400)
POTASSIUM SERPL-MCNC: 4.1 MMOL/L — SIGNIFICANT CHANGE UP (ref 3.5–5.3)
POTASSIUM SERPL-SCNC: 4.1 MMOL/L — SIGNIFICANT CHANGE UP (ref 3.5–5.3)
PROT SERPL-MCNC: 7.6 G/DL — SIGNIFICANT CHANGE UP (ref 6–8.3)
RBC # BLD: 4.08 M/UL — SIGNIFICANT CHANGE UP (ref 3.8–5.2)
RBC # FLD: 13.2 % — SIGNIFICANT CHANGE UP (ref 10.3–14.5)
SODIUM SERPL-SCNC: 139 MMOL/L — SIGNIFICANT CHANGE UP (ref 135–145)
SPECIMEN SOURCE: SIGNIFICANT CHANGE UP
TRIGL SERPL-MCNC: 146 MG/DL — SIGNIFICANT CHANGE UP
WBC # BLD: 6.48 K/UL — SIGNIFICANT CHANGE UP (ref 3.8–10.5)
WBC # FLD AUTO: 6.48 K/UL — SIGNIFICANT CHANGE UP (ref 3.8–10.5)

## 2022-09-28 PROCEDURE — 99232 SBSQ HOSP IP/OBS MODERATE 35: CPT | Mod: GC

## 2022-09-28 RX ORDER — ACETAMINOPHEN 500 MG
650 TABLET ORAL EVERY 6 HOURS
Refills: 0 | Status: DISCONTINUED | OUTPATIENT
Start: 2022-09-28 | End: 2022-09-29

## 2022-09-28 RX ORDER — INFLUENZA VIRUS VACCINE 15; 15; 15; 15 UG/.5ML; UG/.5ML; UG/.5ML; UG/.5ML
0.5 SUSPENSION INTRAMUSCULAR ONCE
Refills: 0 | Status: DISCONTINUED | OUTPATIENT
Start: 2022-09-28 | End: 2022-09-29

## 2022-09-28 RX ADMIN — ONDANSETRON 4 MILLIGRAM(S): 8 TABLET, FILM COATED ORAL at 08:59

## 2022-09-28 RX ADMIN — ONDANSETRON 4 MILLIGRAM(S): 8 TABLET, FILM COATED ORAL at 23:49

## 2022-09-28 RX ADMIN — SODIUM CHLORIDE 100 MILLILITER(S): 9 INJECTION, SOLUTION INTRAVENOUS at 11:30

## 2022-09-28 RX ADMIN — MORPHINE SULFATE 4 MILLIGRAM(S): 50 CAPSULE, EXTENDED RELEASE ORAL at 23:49

## 2022-09-28 RX ADMIN — Medication 650 MILLIGRAM(S): at 08:17

## 2022-09-28 RX ADMIN — Medication 650 MILLIGRAM(S): at 07:47

## 2022-09-28 RX ADMIN — GABAPENTIN 300 MILLIGRAM(S): 400 CAPSULE ORAL at 22:17

## 2022-09-28 RX ADMIN — LETROZOLE 2.5 MILLIGRAM(S): 2.5 TABLET, FILM COATED ORAL at 11:29

## 2022-09-28 NOTE — PROGRESS NOTE ADULT - ASSESSMENT
Pt is a 31 yo woman with stage 3 ovarian ca s/p LINCOLN/BSO in June+July '21 currently on letrozole, migraines, and kidney stones who presents with 2 hours of sharp >10/10 epigastric pain. +wei sign, cholelithiasis on imaging suggest gallbladder cause of abd pain. Soft tissue changes around pancreas raise mild concern for pancreatitis, possibly gallstone, however lipase and bili normal. Surgery consulted, considering MRCP to further characterize biliary tree and poss pathology. Other ddx include constipation, GERD, appendicitis, renal stones.

## 2022-09-28 NOTE — PROGRESS NOTE ADULT - ASSESSMENT
32F hx ovarian cancer s/p LINCOLN BSO and chemotherapy, presenting with epigastric abdominal pain, imaging consistent with cholelithiases and possible pancreatitis, however lipase, lfts wnl, nondilated CBD. Low suspicion for cholecystitis given normal WBC and no signs of inflammation on imaging.    - No acute surgical intervention  - MRCP pending  - Advance diet as tolerated  - pain control PRN    B team surgery  [id number]

## 2022-09-28 NOTE — PROGRESS NOTE ADULT - SUBJECTIVE AND OBJECTIVE BOX
Internal Medicine Progress Note    Patient is a 32y old  Female who presents with a chief complaint of epigastric pain (27 Sep 2022 16:36)    OVERNIGHT EVENTS/SUBJECTIVE:    OBJECTIVE:  Vital Signs Last 24 Hrs  T(C): 36.3 (28 Sep 2022 06:00), Max: 36.9 (27 Sep 2022 20:18)  T(F): 97.3 (28 Sep 2022 06:00), Max: 98.4 (27 Sep 2022 20:18)  HR: 73 (28 Sep 2022 06:00) (73 - 92)  BP: 102/64 (28 Sep 2022 06:00) (102/64 - 123/69)  BP(mean): --  RR: 18 (28 Sep 2022 06:00) (16 - 18)  SpO2: 98% (28 Sep 2022 06:00) (97% - 100%)    Parameters below as of 28 Sep 2022 06:00  Patient On (Oxygen Delivery Method): room air      I&O's Detail    Daily Height in cm: 154.94 (27 Sep 2022 22:45)    Daily   Physical Exam:  General: NAD, resting comfortably in bed  Neuro: A&Ox4, 5/5 strength in all ext  HEENT: NC/AT, EOMI, normal hearing, oral mucosa moist, no oral lesions noted, no pharyngeal erythema, uvula midline  Neck: supple, thyroid not enlarged, no LAD  Resp: Breathing comfortably on RA, LCTA b/l  CV: Normal sinus rhythm, S1 and S2, no r/m/g  Abd: soft, non-distended, non-tender. No HSM.  Ext: ROMIx4, no edema, +2 pulses bilaterally  Skin: Warm and dry, no rashes or discolorations  Psych: Appropriate affect    Medications:  MEDICATIONS  (STANDING):  gabapentin 300 milliGRAM(s) Oral at bedtime  influenza   Vaccine 0.5 milliLiter(s) IntraMuscular once  lactated ringers. 1000 milliLiter(s) (100 mL/Hr) IV Continuous <Continuous>  letrozole 2.5 milliGRAM(s) Oral daily    MEDICATIONS  (PRN):  melatonin 3 milliGRAM(s) Oral at bedtime PRN Insomnia  morphine  - Injectable 4 milliGRAM(s) IV Push every 4 hours PRN Severe Pain (7 - 10)  ondansetron Injectable 4 milliGRAM(s) IV Push every 8 hours PRN Nausea and/or Vomiting      Labs:                        11.9   9.34  )-----------( 285      ( 27 Sep 2022 07:00 )             38.2         140  |  102  |  14  ----------------------------<  104<H>  4.5   |  25  |  0.74    Ca    9.7      27 Sep 2022 07:00    TPro  8.4<H>  /  Alb  4.6  /  TBili  0.4  /  DBili  x   /  AST  34<H>  /  ALT  40<H>  /  AlkPhos  96        Urinalysis Basic - ( 27 Sep 2022 07:00 )    Color: Yellow / Appearance: Clear / S.025 / pH: x  Gluc: x / Ketone: Negative  / Bili: Negative / Urobili: <2 mg/dL   Blood: x / Protein: Trace / Nitrite: Negative   Leuk Esterase: Negative / RBC: 36 /HPF / WBC 7 /HPF   Sq Epi: x / Non Sq Epi: 4 /HPF / Bacteria: Negative      COVID-19 PCR: NotDetec (27 Sep 2022 08:01)      Radiology: Internal Medicine Progress Note    Patient is a 32y old  Female who presents with a chief complaint of epigastric pain (27 Sep 2022 16:36)    OVERNIGHT EVENTS/SUBJECTIVE: No overnight events. Headache overnight, tylenol helped. Nausea, given zofran. Says her abd pain is better.  Denies fever, v/d, cp, sob, cough.     OBJECTIVE:  Vital Signs Last 24 Hrs  T(C): 36.3 (28 Sep 2022 06:00), Max: 36.9 (27 Sep 2022 20:18)  T(F): 97.3 (28 Sep 2022 06:00), Max: 98.4 (27 Sep 2022 20:18)  HR: 73 (28 Sep 2022 06:00) (73 - 92)  BP: 102/64 (28 Sep 2022 06:00) (102/64 - 123/69)  BP(mean): --  RR: 18 (28 Sep 2022 06:00) (16 - 18)  SpO2: 98% (28 Sep 2022 06:00) (97% - 100%)    Parameters below as of 28 Sep 2022 06:00  Patient On (Oxygen Delivery Method): room air      I&O's Detail    Daily Height in cm: 154.94 (27 Sep 2022 22:45)    Daily   Physical Exam:  General: NAD, resting comfortably in bed  Neuro: A&Ox4, no gross deficits   HEENT: NC/AT, EOMI, normal hearing, oral mucosa moist, no oral lesions noted  Resp: Breathing comfortably on RA, LCTA b/l  CV: Normal sinus rhythm, S1 and S2, no r/m/g  Abd: soft, non-distended, non-tender  Ext:  no edema, +2 pulses bilaterally  Skin: Warm and dry, no rashes or discolorations  Psych: Appropriate affect    Medications:  MEDICATIONS  (STANDING):  gabapentin 300 milliGRAM(s) Oral at bedtime  influenza   Vaccine 0.5 milliLiter(s) IntraMuscular once  lactated ringers. 1000 milliLiter(s) (100 mL/Hr) IV Continuous <Continuous>  letrozole 2.5 milliGRAM(s) Oral daily    MEDICATIONS  (PRN):  melatonin 3 milliGRAM(s) Oral at bedtime PRN Insomnia  morphine  - Injectable 4 milliGRAM(s) IV Push every 4 hours PRN Severe Pain (7 - 10)  ondansetron Injectable 4 milliGRAM(s) IV Push every 8 hours PRN Nausea and/or Vomiting      Labs:                        11.9   9.34  )-----------( 285      ( 27 Sep 2022 07:00 )             38.2         140  |  102  |  14  ----------------------------<  104<H>  4.5   |  25  |  0.74    Ca    9.7      27 Sep 2022 07:00    TPro  8.4<H>  /  Alb  4.6  /  TBili  0.4  /  DBili  x   /  AST  34<H>  /  ALT  40<H>  /  AlkPhos  96        Urinalysis Basic - ( 27 Sep 2022 07:00 )    Color: Yellow / Appearance: Clear / S.025 / pH: x  Gluc: x / Ketone: Negative  / Bili: Negative / Urobili: <2 mg/dL   Blood: x / Protein: Trace / Nitrite: Negative   Leuk Esterase: Negative / RBC: 36 /HPF / WBC 7 /HPF   Sq Epi: x / Non Sq Epi: 4 /HPF / Bacteria: Negative      COVID-19 PCR: NotDetec (27 Sep 2022 08:01)      Radiology:

## 2022-09-28 NOTE — PROGRESS NOTE ADULT - SUBJECTIVE AND OBJECTIVE BOX
SURGERY  Pager: #[phone]    INTERVAL EVENTS/SUBJECTIVE: No acute events overnight. Patient seen and examined at bedside, reports tolerating clears and not having worsening abdominal pain with diet. Has been passing gas. No nausea or vomiting. Pain has been improving.     ______________________________________________  OBJECTIVE:   T(C): 36.3 (09-28-22 @ 06:00), Max: 36.9 (09-27-22 @ 20:18)  HR: 73 (09-28-22 @ 06:00) (73 - 92)  BP: 102/64 (09-28-22 @ 06:00) (102/64 - 123/69)  RR: 18 (09-28-22 @ 06:00) (16 - 18)  SpO2: 98% (09-28-22 @ 06:00) (97% - 100%)  Wt(kg): --  CAPILLARY BLOOD GLUCOSE        I&O's Detail      Physical exam:  Gen: resting in bed comfortably in NAD  Chest: no increased WOB, regular inspiratory effort   Abdomen: Soft, distended for habitus, mildly TTP in epigastrium   Vascular: MARÍAP, POWELL x4  NEURO: awake, alert  ______________________________________________  LABS:  CBC Full  -  ( 28 Sep 2022 06:56 )  WBC Count : 6.48 K/uL  RBC Count : 4.08 M/uL  Hemoglobin : 11.2 g/dL  Hematocrit : 35.2 %  Platelet Count - Automated : 257 K/uL  Mean Cell Volume : 86.3 fL  Mean Cell Hemoglobin : 27.5 pg  Mean Cell Hemoglobin Concentration : 31.8 gm/dL  Auto Neutrophil # : 2.76 K/uL  Auto Lymphocyte # : 3.12 K/uL  Auto Monocyte # : 0.45 K/uL  Auto Eosinophil # : 0.09 K/uL  Auto Basophil # : 0.04 K/uL  Auto Neutrophil % : 42.7 %  Auto Lymphocyte % : 48.1 %  Auto Monocyte % : 6.9 %  Auto Eosinophil % : 1.4 %  Auto Basophil % : 0.6 %    09-28    139  |  101  |  9   ----------------------------<  85  4.1   |  27  |  0.71    Ca    10.0      28 Sep 2022 06:56  Phos  5.1     09-28  Mg     1.70     09-28    TPro  7.6  /  Alb  4.2  /  TBili  0.4  /  DBili  x   /  AST  37<H>  /  ALT  40<H>  /  AlkPhos  81  09-28    _____________________________________________  RADIOLOGY:

## 2022-09-29 ENCOUNTER — TRANSCRIPTION ENCOUNTER (OUTPATIENT)
Age: 32
End: 2022-09-29

## 2022-09-29 VITALS
RESPIRATION RATE: 18 BRPM | DIASTOLIC BLOOD PRESSURE: 71 MMHG | OXYGEN SATURATION: 99 % | SYSTOLIC BLOOD PRESSURE: 118 MMHG | HEART RATE: 89 BPM | TEMPERATURE: 98 F

## 2022-09-29 LAB
ALBUMIN SERPL ELPH-MCNC: 4.6 G/DL — SIGNIFICANT CHANGE UP (ref 3.3–5)
ALP SERPL-CCNC: 94 U/L — SIGNIFICANT CHANGE UP (ref 40–120)
ALT FLD-CCNC: 37 U/L — HIGH (ref 4–33)
ANION GAP SERPL CALC-SCNC: 9 MMOL/L — SIGNIFICANT CHANGE UP (ref 7–14)
AST SERPL-CCNC: 33 U/L — HIGH (ref 4–32)
BASOPHILS # BLD AUTO: 0.03 K/UL — SIGNIFICANT CHANGE UP (ref 0–0.2)
BASOPHILS NFR BLD AUTO: 0.4 % — SIGNIFICANT CHANGE UP (ref 0–2)
BILIRUB SERPL-MCNC: 0.2 MG/DL — SIGNIFICANT CHANGE UP (ref 0.2–1.2)
BUN SERPL-MCNC: 16 MG/DL — SIGNIFICANT CHANGE UP (ref 7–23)
CALCIUM SERPL-MCNC: 10 MG/DL — SIGNIFICANT CHANGE UP (ref 8.4–10.5)
CHLORIDE SERPL-SCNC: 98 MMOL/L — SIGNIFICANT CHANGE UP (ref 98–107)
CO2 SERPL-SCNC: 28 MMOL/L — SIGNIFICANT CHANGE UP (ref 22–31)
CREAT SERPL-MCNC: 0.92 MG/DL — SIGNIFICANT CHANGE UP (ref 0.5–1.3)
EGFR: 85 ML/MIN/1.73M2 — SIGNIFICANT CHANGE UP
EOSINOPHIL # BLD AUTO: 0.14 K/UL — SIGNIFICANT CHANGE UP (ref 0–0.5)
EOSINOPHIL NFR BLD AUTO: 1.6 % — SIGNIFICANT CHANGE UP (ref 0–6)
GLUCOSE SERPL-MCNC: 108 MG/DL — HIGH (ref 70–99)
HCT VFR BLD CALC: 38.1 % — SIGNIFICANT CHANGE UP (ref 34.5–45)
HGB BLD-MCNC: 11.7 G/DL — SIGNIFICANT CHANGE UP (ref 11.5–15.5)
IANC: 3.77 K/UL — SIGNIFICANT CHANGE UP (ref 1.8–7.4)
IMM GRANULOCYTES NFR BLD AUTO: 0.2 % — SIGNIFICANT CHANGE UP (ref 0–0.9)
LIDOCAIN IGE QN: 33 U/L — SIGNIFICANT CHANGE UP (ref 7–60)
LYMPHOCYTES # BLD AUTO: 4.05 K/UL — HIGH (ref 1–3.3)
LYMPHOCYTES # BLD AUTO: 47.4 % — HIGH (ref 13–44)
MAGNESIUM SERPL-MCNC: 1.7 MG/DL — SIGNIFICANT CHANGE UP (ref 1.6–2.6)
MCHC RBC-ENTMCNC: 27.3 PG — SIGNIFICANT CHANGE UP (ref 27–34)
MCHC RBC-ENTMCNC: 30.7 GM/DL — LOW (ref 32–36)
MCV RBC AUTO: 89 FL — SIGNIFICANT CHANGE UP (ref 80–100)
MONOCYTES # BLD AUTO: 0.53 K/UL — SIGNIFICANT CHANGE UP (ref 0–0.9)
MONOCYTES NFR BLD AUTO: 6.2 % — SIGNIFICANT CHANGE UP (ref 2–14)
NEUTROPHILS # BLD AUTO: 3.77 K/UL — SIGNIFICANT CHANGE UP (ref 1.8–7.4)
NEUTROPHILS NFR BLD AUTO: 44.2 % — SIGNIFICANT CHANGE UP (ref 43–77)
NRBC # BLD: 0 /100 WBCS — SIGNIFICANT CHANGE UP (ref 0–0)
NRBC # FLD: 0 K/UL — SIGNIFICANT CHANGE UP (ref 0–0)
PHOSPHATE SERPL-MCNC: 4.2 MG/DL — SIGNIFICANT CHANGE UP (ref 2.5–4.5)
PLATELET # BLD AUTO: 284 K/UL — SIGNIFICANT CHANGE UP (ref 150–400)
POTASSIUM SERPL-MCNC: 4.3 MMOL/L — SIGNIFICANT CHANGE UP (ref 3.5–5.3)
POTASSIUM SERPL-SCNC: 4.3 MMOL/L — SIGNIFICANT CHANGE UP (ref 3.5–5.3)
PROT SERPL-MCNC: 8.1 G/DL — SIGNIFICANT CHANGE UP (ref 6–8.3)
RBC # BLD: 4.28 M/UL — SIGNIFICANT CHANGE UP (ref 3.8–5.2)
RBC # FLD: 13.2 % — SIGNIFICANT CHANGE UP (ref 10.3–14.5)
SODIUM SERPL-SCNC: 135 MMOL/L — SIGNIFICANT CHANGE UP (ref 135–145)
WBC # BLD: 8.54 K/UL — SIGNIFICANT CHANGE UP (ref 3.8–10.5)
WBC # FLD AUTO: 8.54 K/UL — SIGNIFICANT CHANGE UP (ref 3.8–10.5)

## 2022-09-29 PROCEDURE — 99239 HOSP IP/OBS DSCHRG MGMT >30: CPT | Mod: GC

## 2022-09-29 PROCEDURE — 74183 MRI ABD W/O CNTR FLWD CNTR: CPT | Mod: 26

## 2022-09-29 RX ORDER — ONDANSETRON 8 MG/1
1 TABLET, FILM COATED ORAL
Qty: 12 | Refills: 0
Start: 2022-09-29 | End: 2022-10-02

## 2022-09-29 RX ADMIN — LETROZOLE 2.5 MILLIGRAM(S): 2.5 TABLET, FILM COATED ORAL at 13:01

## 2022-09-29 RX ADMIN — ONDANSETRON 4 MILLIGRAM(S): 8 TABLET, FILM COATED ORAL at 08:47

## 2022-09-29 RX ADMIN — MORPHINE SULFATE 4 MILLIGRAM(S): 50 CAPSULE, EXTENDED RELEASE ORAL at 00:19

## 2022-09-29 NOTE — DISCHARGE NOTE PROVIDER - NSDCFUSCHEDAPPT_GEN_ALL_CORE_FT
Lucia Morin  [facility] Physician [facility]  PEDALLERGY [address] [facility]  Scheduled Appointment: 10/12/2022    [facility]  [facility]  Scheduled Appointment: 10/17/2022    Shoshana Michael  [facility] Physician [facility]  DERM [address]  Scheduled Appointment: 11/02/2022    Jaylen Lopez  [facility] Physician [facility]  [facility]  Scheduled Appointment: 11/14/2022    [facility]  Scheduled Appointment: 11/14/2022    Magalys Fraire  [facility] Physician [facility]  GYNONC [address]  Scheduled Appointment: 12/21/2022

## 2022-09-29 NOTE — PROGRESS NOTE ADULT - PROBLEM SELECTOR PLAN 2
S/p LINCOLN/BSO in July 2021, on letrozole now. Follows with onc and surgeon, q6mo surveillance CT scans  - c/w letrozole  - Dr Fraire aware of hospitalization
S/p LINCOLN/BSO in July 2021, on letrozole now. Follows with onc and surgeon, q6mo surveillance CT scans  - c/w letrozole  - Dr Fraire aware of hospitalization

## 2022-09-29 NOTE — PROGRESS NOTE ADULT - SUBJECTIVE AND OBJECTIVE BOX
TEAM Surgery Progress Note  Patient is a 32y old  Female who presents with a chief complaint of epigastric pain (28 Sep 2022 13:25)      INTERVAL EVENTS: Patient is POD# ***No acute events overnight.  SUBJECTIVE: Patient seen and examined at bedside with surgical team, patient without complaints. Denies fever, chills, CP, SOB nausea, vomiting, abdominal pain.    REVIEW OF SYSTEMS:  Constitutional: No fevers or chills. No malaise or weakness.  EENT: No vision changes. No ear pain. No nasal congestion or rhinitis. No throat pain or dysphagia.  Respiratory: No cough, wheezing, or SOB. No hemoptysis.  Cardiovascular: No chest pain or palpitations.  Gastrointestinal: No abdominal pain. No nausea, vomiting, diarrhea or constipation. No hematochezia. No melena.  Genitourinary: No dysuria, hematuria, or frequency.  Neurologic: No numbness or tingling. No weakness.  Skin: No rashes or pruritus.     OBJECTIVE:    Vital Signs Last 24 Hrs  T(C): 36.5 (28 Sep 2022 21:30), Max: 36.5 (28 Sep 2022 13:45)  T(F): 97.7 (28 Sep 2022 21:30), Max: 97.7 (28 Sep 2022 13:45)  HR: 99 (28 Sep 2022 21:30) (73 - 99)  BP: 119/84 (28 Sep 2022 21:30) (102/64 - 135/89)  BP(mean): --  RR: 18 (28 Sep 2022 21:30) (18 - 18)  SpO2: 99% (28 Sep 2022 21:30) (98% - 99%)    Parameters below as of 28 Sep 2022 21:30  Patient On (Oxygen Delivery Method): room air    I&O's Detail  MEDICATIONS  (STANDING):  gabapentin 300 milliGRAM(s) Oral at bedtime  influenza   Vaccine 0.5 milliLiter(s) IntraMuscular once  lactated ringers. 1000 milliLiter(s) (100 mL/Hr) IV Continuous <Continuous>  letrozole 2.5 milliGRAM(s) Oral daily    MEDICATIONS  (PRN):  acetaminophen     Tablet .. 650 milliGRAM(s) Oral every 6 hours PRN Mild Pain (1 - 3), Moderate Pain (4 - 6)  melatonin 3 milliGRAM(s) Oral at bedtime PRN Insomnia  morphine  - Injectable 4 milliGRAM(s) IV Push every 4 hours PRN Severe Pain (7 - 10)  ondansetron Injectable 4 milliGRAM(s) IV Push every 8 hours PRN Nausea and/or Vomiting      PHYSICAL EXAM:  Constitutional: A&Ox3, NAD  Respiratory: Unlabored breathing  Abdomen: Soft, nondistended, NTTP. No rebound or guarding.  Extremities: WWP, POWELL spontaneously    LABS:                        11.2   6.48  )-----------( 257      ( 28 Sep 2022 06:56 )             35.2         139  |  101  |  9   ----------------------------<  85  4.1   |  27  |  0.71    Ca    10.0      28 Sep 2022 06:56  Phos  5.1       Mg     1.70         TPro  7.6  /  Alb  4.2  /  TBili  0.4  /  DBili  x   /  AST  37<H>  /  ALT  40<H>  /  AlkPhos  81      PTT - ( 28 Sep 2022 06:56 )  PTT:33.3 sec  LIVER FUNCTIONS - ( 28 Sep 2022 06:56 )  Alb: 4.2 g/dL / Pro: 7.6 g/dL / ALK PHOS: 81 U/L / ALT: 40 U/L / AST: 37 U/L / GGT: x           Urinalysis Basic - ( 27 Sep 2022 07:00 )    Color: Yellow / Appearance: Clear / S.025 / pH: x  Gluc: x / Ketone: Negative  / Bili: Negative / Urobili: <2 mg/dL   Blood: x / Protein: Trace / Nitrite: Negative   Leuk Esterase: Negative / RBC: 36 /HPF / WBC 7 /HPF   Sq Epi: x / Non Sq Epi: 4 /HPF / Bacteria: Negative          IMAGING:     TEAM Surgery Progress Note  Patient is a 32y old  Female who presents with a chief complaint of epigastric pain (28 Sep 2022 13:25)      INTERVAL EVENTS: No acute events overnight.  SUBJECTIVE: Patient seen and examined at bedside with surgical team, patient without complaints. Denies fever, chills, CP, SOB nausea, vomiting, abdominal pain. Abdominal pain improving. Passing gas, having bowel movements, tolerating regular diet.     OBJECTIVE:    Vital Signs Last 24 Hrs  T(C): 36.5 (28 Sep 2022 21:30), Max: 36.5 (28 Sep 2022 13:45)  T(F): 97.7 (28 Sep 2022 21:30), Max: 97.7 (28 Sep 2022 13:45)  HR: 99 (28 Sep 2022 21:30) (73 - 99)  BP: 119/84 (28 Sep 2022 21:30) (102/64 - 135/89)  BP(mean): --  RR: 18 (28 Sep 2022 21:30) (18 - 18)  SpO2: 99% (28 Sep 2022 21:30) (98% - 99%)    Parameters below as of 28 Sep 2022 21:30  Patient On (Oxygen Delivery Method): room air    I&O's Detail  MEDICATIONS  (STANDING):  gabapentin 300 milliGRAM(s) Oral at bedtime  influenza   Vaccine 0.5 milliLiter(s) IntraMuscular once  lactated ringers. 1000 milliLiter(s) (100 mL/Hr) IV Continuous <Continuous>  letrozole 2.5 milliGRAM(s) Oral daily    MEDICATIONS  (PRN):  acetaminophen     Tablet .. 650 milliGRAM(s) Oral every 6 hours PRN Mild Pain (1 - 3), Moderate Pain (4 - 6)  melatonin 3 milliGRAM(s) Oral at bedtime PRN Insomnia  morphine  - Injectable 4 milliGRAM(s) IV Push every 4 hours PRN Severe Pain (7 - 10)  ondansetron Injectable 4 milliGRAM(s) IV Push every 8 hours PRN Nausea and/or Vomiting      PHYSICAL EXAM:  Constitutional: A&Ox3, NAD  Respiratory: Unlabored breathing  Abdomen: Soft, nondistended, NTTP. No rebound or guarding.  Extremities: WWP, POWELL spontaneously    LABS:                        11.2   6.48  )-----------( 257      ( 28 Sep 2022 06:56 )             35.2         139  |  101  |  9   ----------------------------<  85  4.1   |  27  |  0.71    Ca    10.0      28 Sep 2022 06:56  Phos  5.1       Mg     1.70         TPro  7.6  /  Alb  4.2  /  TBili  0.4  /  DBili  x   /  AST  37<H>  /  ALT  40<H>  /  AlkPhos  81      PTT - ( 28 Sep 2022 06:56 )  PTT:33.3 sec  LIVER FUNCTIONS - ( 28 Sep 2022 06:56 )  Alb: 4.2 g/dL / Pro: 7.6 g/dL / ALK PHOS: 81 U/L / ALT: 40 U/L / AST: 37 U/L / GGT: x           Urinalysis Basic - ( 27 Sep 2022 07:00 )    Color: Yellow / Appearance: Clear / S.025 / pH: x  Gluc: x / Ketone: Negative  / Bili: Negative / Urobili: <2 mg/dL   Blood: x / Protein: Trace / Nitrite: Negative   Leuk Esterase: Negative / RBC: 36 /HPF / WBC 7 /HPF   Sq Epi: x / Non Sq Epi: 4 /HPF / Bacteria: Negative          IMAGING:

## 2022-09-29 NOTE — PROGRESS NOTE ADULT - ATTENDING COMMENTS
32F hx of stage 3 ovarian ca s/p LINCOLN/BSO and chemo currently on letrozole, presenting with epigastric pain x5 days concern for gallstone pancreatitis vs. passed stone.     Patient seen and examined, states her abd pain is better today. Endorsing hunger. No vomiting. Discussed likely will need elective poncho as outpatient, she is agreeable and states that GB disease runs in her family.     #Epigastric pain   - Could be due to gallstone pancreatitis given pancreatic fat stranding seen on CT and gallstones seen on Abd US. ?passed gallstone as lipase is not elevated   - No evidence of acute poncho   - Monitor off antibiotics  - Seen by surgery, MRCP recommended and is pending   - IVF hydration   - Will advance diet today   - Pain control w/ morphine     Dispo: Pending MRCP, if unremarkable, likely dc home w/ outpatient Gen Surgery follow-up for elective poncho     Rest of plan as above  Discussed plan with patient
32F hx of stage 3 ovarian ca s/p LINCOLN/BSO and chemo currently on letrozole, presenting with epigastric pain x5 days concern for gallstone pancreatitis vs. passed stone.     Patient seen and examined, states her abd pain continues to get better today. Had some bloating after dinner last night, but no vomiting. Had some nausea, but also endorses nausea at baseline. MRCP results reviewed, showing gallstones but no ductal dilation, normal pancreas. Discussed will likely need elective poncho as outpatient, she is agreeable and states that GB disease runs in her family.     #Epigastric pain, improved    - Could be due to gallstone pancreatitis given pancreatic fat stranding seen on CT and gallstones seen on Abd US. ?passed gallstone as lipase is not elevated   - No evidence of acute poncho   - Monitor off antibiotics  - Seen by surgery, MRCP showing cholelithiasis w/o ductal dilation and normal pancreas  - Tolerating regular diet      Dispo: Patient is medically stable for dc home today. Abd pain improved and MRCP findings as above. Patient to follow-up with w/ outpatient Gen Surgery for elective poncho as suspicion is for biliary colic.      Rest of plan as above  Discussed plan with patient    38 min spent on discharge planning

## 2022-09-29 NOTE — PROGRESS NOTE ADULT - PROBLEM SELECTOR PLAN 1
Epigastric and RUQ pain, severe this am waking from sleep. Imaging showing gallstones with no cholecystitis, possible fat stranding around pancreas however negative lipase makes pancreatitis unlikely. WBC normal.   - Concern for gallstone pancreatitis   - surgery consulted, fu recs  - MRCP   - trend lipase, liver enzymes  - pain control prn   - maintenance fluids
Epigastric and RUQ pain, severe this am waking from sleep. Imaging showing gallstones with no cholecystitis, possible fat stranding around pancreas however negative lipase makes pancreatitis unlikely. WBC normal.   - Concern for gallstone pancreatitis   - surgery consulted, fu recs  - MRCP - with cholelithiasis, no CBD   - trend lipase, liver enzymes  - pain control prn   - maintenance fluids  - ready for dc home with outpt gen. surgery f/u

## 2022-09-29 NOTE — PROGRESS NOTE ADULT - PROBLEM SELECTOR PLAN 3
DVT: lovenox 40u   Diet: clear liquids for now, in general regular diet  Dispo: home
DVT: lovenox 40u   Diet: clear liquids for now, in general regular diet  Dispo: home

## 2022-09-29 NOTE — DISCHARGE NOTE NURSING/CASE MANAGEMENT/SOCIAL WORK - PATIENT PORTAL LINK FT
You can access the FollowMyHealth Patient Portal offered by [facility] by registering at the following website: [email or web address]. By joining [employer]’s FollowMyHealth portal, you will also be able to view your health information using other applications (apps) compatible with our system.

## 2022-09-29 NOTE — DISCHARGE NOTE PROVIDER - NSDCCPTREATMENT_GEN_ALL_CORE_FT
PRINCIPAL PROCEDURE  Procedure: MR MRCP  Findings and Treatment: ACC: [id number] EXAM:  MR MRCP WAW IC                        PROCEDURE DATE:  09/29/2022    INTERPRETATION:  CLINICAL INFORMATION: Epigastric abdominal pain. Concern   for gallstone pancreatitis.  COMPARISON: None.  CONTRAST/COMPLICATIONS:  IV Contrast: Gadavist  9 cc administered   1 cc discarded  Oral Contrast: NONE  Complications: None reported at time of study completion  PROCEDURE:  MRI of the abdomen was performed.  MRCP was performed.  FINDINGS:  LOWER CHEST: Within normal limits.  LIVER: Enlarged, fatty liver.  BILE DUCTS: Normal caliber. No choledocholithiasis.  GALLBLADDER: Cholelithiasis.  SPLEEN: Within normal limits.  PANCREAS: Within normal limits.  ADRENALS: Within normal limits.  KIDNEYS/URETERS: Right renal cyst measuring 1.0 cm.  VISUALIZED PORTIONS:  BOWEL: Within normal limits.  PERITONEUM: No ascites.  VESSELS: Within normal limits.  RETROPERITONEUM/LYMPH NODES: No lymphadenopathy.  ABDOMINAL WALL: Within normal limits.  BONES: Within normal limits.  IMPRESSION:  Cholelithiasis without choledocholithiasis or biliary ductal dilatation.        SECONDARY PROCEDURE  Procedure: Abdomen CT  Findings and Treatment:   INTERPRETATION:  CLINICAL INFORMATION: Evaluate pancreatic abscess versus   mass. Nausea, vomiting. History of ovarian cancer.  COMPARISON: CT abdomen pelvis 5/4/2022.  CONTRAST/COMPLICATIONS:  IV Contrast: Omnipaque 350  70 cc administered   0 cc discarded  Oral Contrast: Omnipaque 300   Fruit 2o  Complications: None reported at time of study completion  PROCEDURE:  CT of the Abdomen and Pelvis was performed.  Arterial and Portal Venous phases were acquired.  Sagittal and coronal reformats were performed.  FINDINGS:  LOWER CHEST: Within normal limits.  LIVER: Steatosis.  BILE DUCTS: Normal caliber.  GALLBLADDER: Within normal limits.  SPLEEN: Within normal limits.  PANCREAS: Normal enhancement. No pancreatic ductal dilatation. Mild   infiltration of the fat adjacent to the uncinate process and at the root   of the small bowel mesentery.  ADRENALS: Within normal limits.  KIDNEYS/URETERS: No hydronephrosis. Nonobstructing bilateral renal   calculi measuring up to 4 mm in the left lower pole.  BLADDER: Within normal limits.  REPRODUCTIVE ORGANS: Hysterectomy.  BOWEL: No bowel obstruction. Appendix is normal.  PERITONEUM: No ascites.  VESSELS: Retroaortic left renal vein.  RETROPERITONEUM/LYMPH NODES: No lymphadenopathy.  ABDOMINAL WALL: Postsurgical changes. Small fat-containing supraumbilical   hernias.  BONES: Within normal limits.  IMPRESSION:  Mild infiltration of the fat adjacent to the pancreatic uncinate process   and at the root of the small bowel mesentery, nonspecific, acute   pancreatitis is not excluded. Correlate with lipase levels.      Procedure: Abdominal ultrasound, limited  Findings and Treatment: INTERPRETATION:  CLINICAL INFORMATION: Fatty infiltration of liver.  COMPARISON: None available.  TECHNIQUE: Sonography of the right upper quadrant.  FINDINGS:  Liver: Within normal limits.  Bile ducts: Normal caliber. Common bile duct measures 4 mm.  Gallbladder: Cholelithiasis.  No focal tenderness or evidence of   cholecystitis.  Pancreas: Visualized portions are within normal limits.  Right kidney: 11.1 cm. No hydronephrosis.  Ascites: None.  IVC: Visualized portions are within normal limits.  IMPRESSION:  *  Fatty infiltration of the liver.  *  Cholelithiasis, without evidence of acute cholecystitis or biliary   ductal dilatation.

## 2022-09-29 NOTE — DISCHARGE NOTE NURSING/CASE MANAGEMENT/SOCIAL WORK - NSDCFUADDAPPT_GEN_ALL_CORE_FT
Please schedule an elective cholecystecomy with Dr. Nguyen.   Please schedule a follow-up GI evaluation as needed

## 2022-09-29 NOTE — PROGRESS NOTE ADULT - SUBJECTIVE AND OBJECTIVE BOX
PROGRESS NOTE:   Authored by Dr. Adrianne Salas MD (PGY-1). Pager [facility] [phone] / [facility]     Patient is a 32y old  Female who presents with a chief complaint of epigastric pain (29 Sep 2022 10:37)      SUBJECTIVE / OVERNIGHT EVENTS:  No acute events overnight.     ADDITIONAL REVIEW OF SYSTEMS:  Patient denies fevers, chills, chest pain, shortness of breath, nausea, abdominal pain, diarrhea, constipation, dysuria, leg swelling, headache, light headedness.    MEDICATIONS  (STANDING):  gabapentin 300 milliGRAM(s) Oral at bedtime  influenza   Vaccine 0.5 milliLiter(s) IntraMuscular once  lactated ringers. 1000 milliLiter(s) (100 mL/Hr) IV Continuous <Continuous>  letrozole 2.5 milliGRAM(s) Oral daily    MEDICATIONS  (PRN):  acetaminophen     Tablet .. 650 milliGRAM(s) Oral every 6 hours PRN Mild Pain (1 - 3), Moderate Pain (4 - 6)  melatonin 3 milliGRAM(s) Oral at bedtime PRN Insomnia  morphine  - Injectable 4 milliGRAM(s) IV Push every 4 hours PRN Severe Pain (7 - 10)  ondansetron Injectable 4 milliGRAM(s) IV Push every 8 hours PRN Nausea and/or Vomiting      CAPILLARY BLOOD GLUCOSE        I&O's Summary      PHYSICAL EXAM:  Vital Signs Last 24 Hrs  T(C): 36.7 (29 Sep 2022 05:30), Max: 36.7 (29 Sep 2022 05:30)  T(F): 98 (29 Sep 2022 05:30), Max: 98 (29 Sep 2022 05:30)  HR: 88 (29 Sep 2022 05:30) (76 - 99)  BP: 114/79 (29 Sep 2022 05:30) (114/79 - 135/89)  BP(mean): --  RR: 18 (29 Sep 2022 05:30) (18 - 18)  SpO2: 96% (29 Sep 2022 05:30) (96% - 99%)    Parameters below as of 29 Sep 2022 05:30  Patient On (Oxygen Delivery Method): room air        CONSTITUTIONAL: NAD, well-developed  RESPIRATORY: Normal respiratory effort; lungs are clear to auscultation bilaterally  CARDIOVASCULAR: Regular rate and rhythm, normal S1 and S2, no murmur/rub/gallop; No lower extremity edema; Peripheral pulses are 2+ bilaterally  ABDOMEN: Nontender to palpation, normoactive bowel sounds, no rebound/guarding; No hepatosplenomegaly  MUSCLOSKELETAL: no clubbing or cyanosis of digits; no joint swelling or tenderness to palpation  PSYCH: A+O to person, place, and time; affect appropriate    LABS:                        11.7   8.54  )-----------( 284      ( 29 Sep 2022 06:58 )             38.1     09-29    135  |  98  |  16  ----------------------------<  108<H>  4.3   |  28  |  0.92    Ca    10.0      29 Sep 2022 06:58  Phos  4.2     09-29  Mg     1.70     09-29    TPro  8.1  /  Alb  4.6  /  TBili  0.2  /  DBili  x   /  AST  33<H>  /  ALT  37<H>  /  AlkPhos  94  09-29    PTT - ( 28 Sep 2022 06:56 )  PTT:33.3 sec          Culture - Urine (collected 27 Sep 2022 07:00)  Source: Clean Catch Clean Catch (Midstream)  Final Report (28 Sep 2022 23:05):    Culture grew 3 or more types of organisms which indicate    collection contamination; consider recollection only if clinically    indicated.        Tele Reviewed:    RADIOLOGY & ADDITIONAL TESTS:  Results Reviewed:   Imaging Personally Reviewed:  Electrocardiogram Personally Reviewed:

## 2022-09-29 NOTE — PROGRESS NOTE ADULT - ASSESSMENT
32F hx ovarian cancer s/p LINCOLN BSO and chemotherapy, presenting with epigastric abdominal pain, imaging consistent with cholelithiases and possible pancreatitis, however lipase, lfts wnl, nondilated CBD. Low suspicion for cholecystitis given normal WBC and no signs of inflammation on imaging.    - No acute surgical intervention  - MRCP pending  - Advance diet as tolerated  - pain control PRN    B team surgery  [id number] 32F hx ovarian cancer s/p LINCOLN BSO and chemotherapy, presenting with epigastric abdominal pain, imaging consistent with cholelithiases and possible pancreatitis, however lipase, lfts wnl, nondilated CBD. Low suspicion for cholecystitis given normal WBC and no signs of inflammation on imaging.    - Patient pain improving, not associated with food intake  - MRCP pending, however with nondilated ducts on US  - Please call back if concerning findings on MRCP or if clinical exam worsens     B team surgery  [id number] 32F hx ovarian cancer s/p LINCOLN BSO and chemotherapy, presenting with epigastric abdominal pain, imaging consistent with cholelithiases and possible pancreatitis, however lipase, lfts wnl, nondilated CBD. Low suspicion for cholecystitis given normal WBC and no signs of inflammation on imaging.    - Patient pain improving, not associated with food intake  - MRCP pending, however with nondilated ducts on US  - Consider pepcid/antacid   - Please call back if concerning findings on MRCP or if clinical exam worsens     B team surgery  [id number]

## 2022-09-29 NOTE — DISCHARGE NOTE PROVIDER - CARE PROVIDERS DIRECT ADDRESSES
[email or web address]

## 2022-09-29 NOTE — DISCHARGE NOTE PROVIDER - HOSPITAL COURSE
32F hx ovarian cancer s/p LINCOLN BSO and chemotherapy, presenting with epigastric abdominal pain, imaging consistent with cholelithiases and possible pancreatitis. However lipase and LFTS wnl, nondilated CBD on ultrasound. Low suspicion for cholecystitis given normal WBC and no signs of inflammation on imaging. Pt's pain improving, reports pain s/p food intake. MRCP revealed _________.    32F hx ovarian cancer s/p LINCOLN BSO and chemotherapy, presenting with epigastric abdominal pain, imaging consistent with cholelithiases and possible pancreatitis. However lipase and LFTS wnl, nondilated CBD on ultrasound. Low suspicion for cholecystitis given normal WBC and no signs of inflammation on imaging. Pt's pain improving, reports pain s/p food intake. MRCP revealed cholelithiasis with no CBD. Pt followed by general surgery and internal medicine team. Pt's pain improved during hospital course, however, does report discomfort  after eating, but tolerable. Pt also reports nausea, will be d/c home with zofran x4 Q8 PRN and recommended for pain control with Tylenol. Otherwise, pt stable for discharge today and recommended to call general surgery to schedule elective cholecystectomy 32F hx ovarian cancer s/p LINCOLN BSO and chemotherapy, presenting with epigastric abdominal pain, imaging consistent with cholelithiases and possible pancreatitis. However lipase and LFTS wnl, nondilated CBD on ultrasound. Low suspicion for cholecystitis given normal WBC and no signs of inflammation on imaging. Pt's pain improving, reports pain s/p food intake. MRCP revealed cholelithiasis with no CBD dilatation. Pt followed by general surgery and internal medicine team. Pt's pain improved during hospital course, however, does report discomfort  after eating, but tolerable. Pt also reports nausea, will be d/c home with zofran x4 Q8 PRN and recommended for pain control with Tylenol. Otherwise, pt stable for discharge home and with outpatient follow-up with general surgery to schedule elective cholecystectomy

## 2022-09-29 NOTE — DISCHARGE NOTE PROVIDER - CARE PROVIDER_API CALL
Stanislav Nguyen)  Surgery; Surgical Critical Care  [address], [address]  [place] [address]  Phone: [phone]  Fax: [phone]  Follow Up Time:

## 2022-09-29 NOTE — DISCHARGE NOTE NURSING/CASE MANAGEMENT/SOCIAL WORK - NSDCPEFALRISK_GEN_ALL_CORE
For information on Fall & Injury Prevention, visit: [email or web address] OR  [email or web address] OR  https://www.cdc.gov/steadi/patient.html

## 2022-09-29 NOTE — DISCHARGE NOTE PROVIDER - NSDCCPCAREPLAN_GEN_ALL_CORE_FT
PRINCIPAL DISCHARGE DIAGNOSIS  Diagnosis: Epigastric pain  Assessment and Plan of Treatment: You presented with epigastric pain concerning for gallstone disease. We first obtained an ultrasound, then a CT abd/pelv as well as an MRCP that showed gallbladder filled with gallstones, but no common bile duct dilation. You possible passed a stone during your previous episode of epigastric pain. You did not have any pancreatitis as your pancreatic enzymes levels were within normal limits. It is very important for you to call general surgery to schedule an elective cholecystecomy as soon as possible, monitor pain with Tylenol and use the Zofran as needed.      SECONDARY DISCHARGE DIAGNOSES  Diagnosis: Ovarian cancer  Assessment and Plan of Treatment: Please continue to follow with your [facility] doctor and continue your home meds.     PRINCIPAL DISCHARGE DIAGNOSIS  Diagnosis: Epigastric pain  Assessment and Plan of Treatment: You presented with epigastric pain concerning for gallstone disease. We first obtained an ultrasound, then a CT abd/pelv as well as an MRCP that showed gallbladder filled with gallstones, but no common bile duct dilation. You possibly passed a stone during your episode of epigastric pain. You had very mild inflammation of the pancreas, likely due to a passed gallstone. It is very important for you to follow-up with General Surgery to schedule an elective cholecystecomy as soon as possible. Please return to the hospital if you experience worsening pain, fevers, nausea.      SECONDARY DISCHARGE DIAGNOSES  Diagnosis: Ovarian cancer  Assessment and Plan of Treatment: Please continue to follow with your [facility] doctor and continue your home medications.

## 2022-09-29 NOTE — DISCHARGE NOTE PROVIDER - NSDCFUADDAPPT_GEN_ALL_CORE_FT
Please schedule an elective cholecystecomy with Dr. Nguyen.   Please schedule a follow-up GI evaluation as needed  Please schedule an elective cholecystectomy with Dr. Nguyen.   Please schedule a follow-up GI evaluation as needed

## 2022-09-29 NOTE — DISCHARGE NOTE PROVIDER - NSDCMRMEDTOKEN_GEN_ALL_CORE_FT
albuterol 90 mcg/inh inhalation aerosol: 2 puff(s) inhaled every 6 hours, As Needed  gabapentin 300 mg oral capsule: 1 cap(s) orally 3 times a day  letrozole 2.5 mg oral tablet: 1 tab(s) orally once a day   albuterol 90 mcg/inh inhalation aerosol: 2 puff(s) inhaled every 6 hours, As Needed  gabapentin 300 mg oral capsule: 1 cap(s) orally 3 times a day  letrozole 2.5 mg oral tablet: 1 tab(s) orally once a day  ondansetron 4 mg oral tablet: 1 tab(s) orally 3 times a day, As Needed MDD:3

## 2022-09-29 NOTE — DISCHARGE NOTE PROVIDER - NSFOLLOWUPCLINICS_GEN_ALL_ED_FT
Gastroenterology at [facility]  Gastroenterology  [address] [place] [id number]  Phone: [phone]  Fax:

## 2022-10-12 ENCOUNTER — APPOINTMENT (OUTPATIENT)
Dept: PEDIATRIC ALLERGY IMMUNOLOGY | Facility: CLINIC | Age: 32
End: 2022-10-12

## 2022-10-12 VITALS
SYSTOLIC BLOOD PRESSURE: 122 MMHG | HEART RATE: 98 BPM | BODY MASS INDEX: 41.86 KG/M2 | DIASTOLIC BLOOD PRESSURE: 88 MMHG | WEIGHT: 221.38 LBS | OXYGEN SATURATION: 96 %

## 2022-10-12 DIAGNOSIS — J31.0 CHRONIC RHINITIS: ICD-10-CM

## 2022-10-12 PROCEDURE — 99213 OFFICE O/P EST LOW 20 MIN: CPT | Mod: 25

## 2022-10-12 PROCEDURE — 95004 PERQ TESTS W/ALRGNC XTRCS: CPT

## 2022-10-17 ENCOUNTER — APPOINTMENT (OUTPATIENT)
Dept: HEMATOLOGY ONCOLOGY | Facility: CLINIC | Age: 32
End: 2022-10-17

## 2022-10-18 ENCOUNTER — APPOINTMENT (OUTPATIENT)
Dept: TRAUMA SURGERY | Facility: HOSPITAL | Age: 32
End: 2022-10-18

## 2022-10-18 VITALS
TEMPERATURE: 97.9 F | SYSTOLIC BLOOD PRESSURE: 127 MMHG | HEIGHT: 61 IN | WEIGHT: 218 LBS | DIASTOLIC BLOOD PRESSURE: 86 MMHG | BODY MASS INDEX: 41.16 KG/M2 | HEART RATE: 92 BPM

## 2022-10-18 PROCEDURE — 99213 OFFICE O/P EST LOW 20 MIN: CPT

## 2022-10-19 NOTE — HISTORY OF PRESENT ILLNESS
[de-identified] : Mrs. Rodrigez is 31 y/o F who has a complicated surgical history who presents with known cholelithiasis and postprandial pain. She has noted these symptoms for many months and has limited her diet due to this discomfort. She denies jaundicing, nausea, vomiting and bowel habit changes. Her oncologic workup has revealed OSBALDO and is on surveillance currently and on no immunologic or cytotoxic chemotherapy.

## 2022-10-19 NOTE — PLAN
[FreeTextEntry1] : Plan for laparoscopic cholecystectomy. I discussed the possibility of alternative pathologies (ulcer, colitis, oncologic side effects) explaining some of her pain however I felt confident that her pain is due to her gallbladder. I discussed the risks biliary injury requiring additional major surgery, biliary leak requiring endoscopy and bleeding, infection and hernia formation. After this discussion Neli felt comfortable proceeding with surgical planning. \par \par I spent 25min reviewing data, images and information. Greater than 50% of my time was spent in face to face discussion regarding wound healing, postoperative diet and activity.\par \par Stanislav Nguyen MD\par Acute Care Surgery\par

## 2022-10-21 PROBLEM — J31.0 NONALLERGIC RHINITIS: Status: ACTIVE | Noted: 2022-10-21

## 2022-10-21 NOTE — REVIEW OF SYSTEMS
[Nl] : Genitourinary [Immunizations are up to date] : Immunizations are up to date [COVID-19 Vaccination Complete] : COVID-19 vaccination complete [Received Influenza Vaccine this Past Year] : Patient has not received the Influenza vaccine this past year

## 2022-10-21 NOTE — CONSULT LETTER
[Dear  ___] : Dear  [unfilled], [Consult Letter:] : I had the pleasure of evaluating your patient, [unfilled]. [Please see my note below.] : Please see my note below. [Consult Closing:] : Thank you very much for allowing me to participate in the care of this patient.  If you have any questions, please do not hesitate to contact me. [Sincerely,] : Sincerely, [FreeTextEntry2] : Juarez Morocho MD [FreeTextEntry3] : Lucia Morin MD\par Attending Physician \par Division of Allergy/Immunology \par [facility] \par \par  of Medicine and Pediatrics\par [facility] \par \par [address]\[place] [address]\par Tel: [phone]\par Fax: [phone]\par Email: [email or web address] \par \par \par

## 2022-10-21 NOTE — PHYSICAL EXAM
[Alert] : alert [Well Nourished] : well nourished [Healthy Appearance] : healthy appearance [No Acute Distress] : no acute distress [Well Developed] : well developed [Normal Pupil & Iris Size/Symmetry] : normal pupil and iris size and symmetry [No Discharge] : no discharge [No Photophobia] : no photophobia [Sclera Not Icteric] : sclera not icteric [Normal TMs] : both tympanic membranes were normal [Normal Nasal Mucosa] : the nasal mucosa was normal [Normal Lips/Tongue] : the lips and tongue were normal [Normal Outer Ear/Nose] : the ears and nose were normal in appearance [Normal Tonsils] : normal tonsils [No Thrush] : no thrush [Pale mucosa] : pale mucosa [Supple] : the neck was supple [Normal Rate and Effort] : normal respiratory rhythm and effort [No Crackles] : no crackles [No Retractions] : no retractions [Bilateral Audible Breath Sounds] : bilateral audible breath sounds [Normal Rate] : heart rate was normal  [Normal S1, S2] : normal S1 and S2 [No murmur] : no murmur [Regular Rhythm] : with a regular rhythm [Soft] : abdomen soft [Not Tender] : non-tender [Not Distended] : not distended [No HSM] : no hepato-splenomegaly [Normal Cervical Lymph Nodes] : cervical [No Rash] : no rash [Skin Intact] : skin intact  [No Skin Lesions] : no skin lesions [No clubbing] : no clubbing [No Edema] : no edema [No Cyanosis] : no cyanosis [Normal Mood] : mood was normal [Normal Affect] : affect was normal [Alert, Awake, Oriented as Age-Appropriate] : alert, awake, oriented as age appropriate

## 2022-10-21 NOTE — SOCIAL HISTORY
[Apartment] : [unfilled] lives in an apartment  [Dog] : dog [Smokers in Household] : there are smokers in the home [de-identified] : lives with her mom and stays with BF on weekends [de-identified] : BFs mom has 2 dogs  [de-identified] : Mother smokes inside

## 2022-10-21 NOTE — HISTORY OF PRESENT ILLNESS
[de-identified] : Neli is a 32 year old  with a hx of ovarian carcinoma s/p resection + chemotherapy, new onset gallstones, and hx of food allergies who presents for initial allergy evaluation.\par \par Pt here to reassess food allergies after chemo.\par Citrus - pineapple, kiwi, strawberry - tongue gets itchy. Kiwi - if touches her skin she gets irritated skin. Takes Benadryl which helps. Has been avoiding within the last year. Wouldliek to try to reintroduce if possible. \par Peanut and other nuts - headaches if she eats a lot. Last week she had peanut butter with an oreo and felt ok.\par Recently sunflower kernels and hazelnuts as well as cocktail peanuts give her a headache.\par Before chemo, onions would give her a headache - continues to avoid.\par \par Tolerates milk,. eggs, wheat, soy, fish and shellfish.\par \par Allergic rhinitis: Symptoms include nasal congestion, rhinorrhea, sneezing, post-nasal drip, ocular pruritus, nasal pruritus, watery eyes, snoring, and mouth breathing.\par Symptoms are present all year long - worst in the spring.\par Medications include zyrtec. Sometimes nasal spray.\par \par Hx of asthma in the past. Developed once after a bad bout of bronchitis.\par Has not used albuterol in > 1 year.\par No wheezing. Some light cough if she is walking outside in the night air. \par Sometimes wakes up in the middle of the night with a throat-clearing cough. Happens more toward the very end of the night.\par \par Insect bites - gets large local reactions.\par \par Med allergies -\par Meclizine - heart races, feels cloudy vision, feels hot and gets anxious\par Menopause meds -avoids due to the reaction - heart races, feels cloudy vision, feels hot and gets anxious

## 2022-10-24 ENCOUNTER — NON-APPOINTMENT (OUTPATIENT)
Age: 32
End: 2022-10-24

## 2022-10-25 ENCOUNTER — RESULT REVIEW (OUTPATIENT)
Age: 32
End: 2022-10-25

## 2022-10-27 ENCOUNTER — OUTPATIENT (OUTPATIENT)
Dept: OUTPATIENT SERVICES | Facility: HOSPITAL | Age: 32
LOS: 1 days | End: 2022-10-27
Payer: COMMERCIAL

## 2022-10-27 DIAGNOSIS — Z98.890 OTHER SPECIFIED POSTPROCEDURAL STATES: Chronic | ICD-10-CM

## 2022-10-27 PROCEDURE — 73560 X-RAY EXAM OF KNEE 1 OR 2: CPT | Mod: 26,LT

## 2022-10-27 PROCEDURE — 73560 X-RAY EXAM OF KNEE 1 OR 2: CPT

## 2022-10-31 ENCOUNTER — NON-APPOINTMENT (OUTPATIENT)
Age: 32
End: 2022-10-31

## 2022-11-01 ENCOUNTER — OUTPATIENT (OUTPATIENT)
Dept: OUTPATIENT SERVICES | Facility: HOSPITAL | Age: 32
LOS: 1 days | End: 2022-11-01

## 2022-11-01 VITALS
WEIGHT: 218.04 LBS | HEART RATE: 99 BPM | OXYGEN SATURATION: 99 % | DIASTOLIC BLOOD PRESSURE: 73 MMHG | TEMPERATURE: 98 F | HEIGHT: 62 IN | RESPIRATION RATE: 18 BRPM | SYSTOLIC BLOOD PRESSURE: 106 MMHG

## 2022-11-01 DIAGNOSIS — K80.50 CALCULUS OF BILE DUCT WITHOUT CHOLANGITIS OR CHOLECYSTITIS WITHOUT OBSTRUCTION: ICD-10-CM

## 2022-11-01 DIAGNOSIS — Z87.19 PERSONAL HISTORY OF OTHER DISEASES OF THE DIGESTIVE SYSTEM: ICD-10-CM

## 2022-11-01 DIAGNOSIS — Z98.890 OTHER SPECIFIED POSTPROCEDURAL STATES: Chronic | ICD-10-CM

## 2022-11-01 DIAGNOSIS — C56.9 MALIGNANT NEOPLASM OF UNSPECIFIED OVARY: Chronic | ICD-10-CM

## 2022-11-01 LAB
ALBUMIN SERPL ELPH-MCNC: 4.6 G/DL — SIGNIFICANT CHANGE UP (ref 3.3–5)
ALP SERPL-CCNC: 107 U/L — SIGNIFICANT CHANGE UP (ref 40–120)
ALT FLD-CCNC: 35 U/L — HIGH (ref 4–33)
ANION GAP SERPL CALC-SCNC: 12 MMOL/L — SIGNIFICANT CHANGE UP (ref 7–14)
AST SERPL-CCNC: 31 U/L — SIGNIFICANT CHANGE UP (ref 4–32)
BILIRUB SERPL-MCNC: 0.3 MG/DL — SIGNIFICANT CHANGE UP (ref 0.2–1.2)
BUN SERPL-MCNC: 8 MG/DL — SIGNIFICANT CHANGE UP (ref 7–23)
CALCIUM SERPL-MCNC: 10 MG/DL — SIGNIFICANT CHANGE UP (ref 8.4–10.5)
CHLORIDE SERPL-SCNC: 102 MMOL/L — SIGNIFICANT CHANGE UP (ref 98–107)
CO2 SERPL-SCNC: 25 MMOL/L — SIGNIFICANT CHANGE UP (ref 22–31)
CREAT SERPL-MCNC: 0.64 MG/DL — SIGNIFICANT CHANGE UP (ref 0.5–1.3)
EGFR: 121 ML/MIN/1.73M2 — SIGNIFICANT CHANGE UP
GLUCOSE SERPL-MCNC: 87 MG/DL — SIGNIFICANT CHANGE UP (ref 70–99)
HCT VFR BLD CALC: 36.8 % — SIGNIFICANT CHANGE UP (ref 34.5–45)
HGB BLD-MCNC: 11.9 G/DL — SIGNIFICANT CHANGE UP (ref 11.5–15.5)
MCHC RBC-ENTMCNC: 28.1 PG — SIGNIFICANT CHANGE UP (ref 27–34)
MCHC RBC-ENTMCNC: 32.3 GM/DL — SIGNIFICANT CHANGE UP (ref 32–36)
MCV RBC AUTO: 86.8 FL — SIGNIFICANT CHANGE UP (ref 80–100)
NRBC # BLD: 0 /100 WBCS — SIGNIFICANT CHANGE UP (ref 0–0)
NRBC # FLD: 0 K/UL — SIGNIFICANT CHANGE UP (ref 0–0)
PLATELET # BLD AUTO: 279 K/UL — SIGNIFICANT CHANGE UP (ref 150–400)
POTASSIUM SERPL-MCNC: 3.9 MMOL/L — SIGNIFICANT CHANGE UP (ref 3.5–5.3)
POTASSIUM SERPL-SCNC: 3.9 MMOL/L — SIGNIFICANT CHANGE UP (ref 3.5–5.3)
PROT SERPL-MCNC: 8.5 G/DL — HIGH (ref 6–8.3)
RBC # BLD: 4.24 M/UL — SIGNIFICANT CHANGE UP (ref 3.8–5.2)
RBC # FLD: 13.2 % — SIGNIFICANT CHANGE UP (ref 10.3–14.5)
SODIUM SERPL-SCNC: 139 MMOL/L — SIGNIFICANT CHANGE UP (ref 135–145)
WBC # BLD: 10.6 K/UL — HIGH (ref 3.8–10.5)
WBC # FLD AUTO: 10.6 K/UL — HIGH (ref 3.8–10.5)

## 2022-11-01 RX ORDER — LETROZOLE 2.5 MG/1
1 TABLET, FILM COATED ORAL
Qty: 0 | Refills: 0 | DISCHARGE

## 2022-11-01 RX ORDER — SODIUM CHLORIDE 9 MG/ML
1000 INJECTION, SOLUTION INTRAVENOUS
Refills: 0 | Status: DISCONTINUED | OUTPATIENT
Start: 2022-11-08 | End: 2022-11-22

## 2022-11-01 RX ORDER — ONDANSETRON 8 MG/1
1 TABLET, FILM COATED ORAL
Qty: 0 | Refills: 0 | DISCHARGE

## 2022-11-01 RX ORDER — GABAPENTIN 400 MG/1
1 CAPSULE ORAL
Qty: 0 | Refills: 0 | DISCHARGE

## 2022-11-01 RX ORDER — FAMOTIDINE 10 MG/ML
1 INJECTION INTRAVENOUS
Qty: 0 | Refills: 0 | DISCHARGE

## 2022-11-01 RX ORDER — ALBUTEROL 90 UG/1
2 AEROSOL, METERED ORAL
Qty: 0 | Refills: 0 | DISCHARGE

## 2022-11-01 NOTE — H&P PST ADULT - PROBLEM SELECTOR PLAN 1
Scheduled for lap Shi. possible open on 11/08/22  Pre-op instructions provided. Pt given verbal and written instructions with teach back on chlorhexidine shampoo and Pepcid. Pt verbalized understanding with return demonstration.   Pending labs  Covid testing scheduled prior to surgery as per patient.   Instructed to take meds DOS with sips of water

## 2022-11-01 NOTE — H&P PST ADULT - NSICDXPASTMEDICALHX_GEN_ALL_CORE_FT
PAST MEDICAL HISTORY:  Asthma mild intermittent    Kidney stone     Kidney stones     Migraine     Ovarian carcinoma     Vertigo

## 2022-11-01 NOTE — H&P PST ADULT - NSICDXPASTSURGICALHX_GEN_ALL_CORE_FT
PAST SURGICAL HISTORY:  History of surgery Pt is s/p Robotic assisted laparoscopic left salpingo-oophorectomy, right ovarian cystectomy, pelvic washings and tumor debulking on 6/15/21. On 7/6/21 she underwent Ex Lap, LINCOLN-RSO, omentectomy and CUSA ablation and optimal tumor debulking - 2021 + chemotherapy    Ovarian ca Insertion of Chemoport - 2021

## 2022-11-01 NOTE — H&P PST ADULT - HISTORY OF PRESENT ILLNESS
32 year with hx Ovarian Ca, s/p LINCOLN/ BSO with Removal of Omentum and Chemotherapy, presents for preop evaluations for Lap Shi, Possible Open tentatively on 11/08/11  Pt has been c/o RUQ pain since 9/2022; Had Ct scan & MRI and Gallstones seen.

## 2022-11-01 NOTE — H&P PST ADULT - NSICDXFAMILYHX_GEN_ALL_CORE_FT
FAMILY HISTORY:  Father  Still living? No  FH: HTN (hypertension), Age at diagnosis: Age Unknown  FH: lung cancer, Age at diagnosis: Age Unknown    Mother  Still living? Yes, Estimated age: Age Unknown  FH: HTN (hypertension), Age at diagnosis: Age Unknown    Sibling  Still living? Unknown  FH: sickle cell anemia, Age at diagnosis: Age Unknown

## 2022-11-01 NOTE — PATIENT PROFILE ADULT - FUNCTIONAL SCREEN CURRENT LEVEL: COMMUNICATION, MLM
General: Sitting in stretcher NAD   HEENT: NCAT MMM EOMI neck supple   Neurological: A&O x3, no focal deficits, strength 5/5 throughout  Cardiovascular: RRR, normal s1 s2, no M/R/G  Respiratory: Non-labored breathing,  CTA b/l, no W/R/R  Gastrointestinal: +BS x4 quadrant, soft, non-tender to palpation, non-distended  Extremities: WWP, no pitting edema, no calf tenderness  Vascular: 2+radial pulses b/l, 2+DP pulses b/l  Incision: MDI, CD&I, no drainage, no erythema, no sternal click General: Sitting in stretcher NAD   HEENT: NCAT MMM EOMI neck supple   Neurological: A&O x3, no focal deficits, strength 5/5 throughout  Cardiovascular: RRR, normal s1 s2, no M/R/G  Respiratory: Short of breath after ambulating, otherwise CTA b/l, no W/R/R  Gastrointestinal: soft, non-tender to palpation, non-distended  Extremities: WWP, 2+ pitting edema, no calf tenderness  Vascular: peripheral pulses palpable   Incision: MSI healed with quarter sized area of fluctuance and erythema, TTP on the upper pole of sternum. Remainder of incision well healed. B/l groin sites well healed 0 = understands/communicates without difficulty

## 2022-11-02 ENCOUNTER — APPOINTMENT (OUTPATIENT)
Dept: DERMATOLOGY | Facility: CLINIC | Age: 32
End: 2022-11-02

## 2022-11-02 DIAGNOSIS — L70.0 ACNE VULGARIS: ICD-10-CM

## 2022-11-02 DIAGNOSIS — L60.4 BEAU'S LINES: ICD-10-CM

## 2022-11-02 DIAGNOSIS — L60.8 OTHER NAIL DISORDERS: ICD-10-CM

## 2022-11-02 PROCEDURE — 99214 OFFICE O/P EST MOD 30 MIN: CPT

## 2022-11-02 RX ORDER — CLINDAMYCIN PHOSPHATE 10 MG/ML
1 LOTION TOPICAL
Qty: 1 | Refills: 11 | Status: ACTIVE | COMMUNITY
Start: 2022-11-02 | End: 1900-01-01

## 2022-11-02 RX ORDER — TRETINOIN 0.25 MG/G
0.03 CREAM TOPICAL
Qty: 1 | Refills: 11 | Status: ACTIVE | COMMUNITY
Start: 2022-11-02 | End: 1900-01-01

## 2022-11-02 NOTE — PHYSICAL EXAM
[Alert] : alert [Oriented x 3] : ~L oriented x 3 [Well Nourished] : well nourished [Conjunctiva Non-injected] : conjunctiva non-injected [No Visual Lymphadenopathy] : no visual  lymphadenopathy [No Clubbing] : no clubbing [No Edema] : no edema [No Bromhidrosis] : no bromhidrosis [No Chromhidrosis] : no chromhidrosis [FreeTextEntry3] : open comedones in umbilical scar\par inflammatory papules neck\par no hemorrhagic areas finger or toenails\par beaus lines toenails

## 2022-11-02 NOTE — HISTORY OF PRESENT ILLNESS
[FreeTextEntry1] : FU nails, breakouts [de-identified] : estab hx ovarian ca s/p hysterectomy and oopherectomy and carbo/pac here for nail FU\par hemorrhage cleared but concerned about line in nail\par on letrozole\par \par \par acne on chin/neck, using otc wash\par \par upcoming gallbladder removal scheduled, hopefully laparoscopic

## 2022-11-04 ENCOUNTER — OUTPATIENT (OUTPATIENT)
Dept: OUTPATIENT SERVICES | Facility: HOSPITAL | Age: 32
LOS: 1 days | Discharge: HOME | End: 2022-11-04

## 2022-11-04 DIAGNOSIS — Z98.890 OTHER SPECIFIED POSTPROCEDURAL STATES: Chronic | ICD-10-CM

## 2022-11-04 DIAGNOSIS — C56.9 MALIGNANT NEOPLASM OF UNSPECIFIED OVARY: Chronic | ICD-10-CM

## 2022-11-04 DIAGNOSIS — C56.9 MALIGNANT NEOPLASM OF UNSPECIFIED OVARY: ICD-10-CM

## 2022-11-04 PROBLEM — N20.0 CALCULUS OF KIDNEY: Chronic | Status: ACTIVE | Noted: 2022-11-01

## 2022-11-05 LAB — SARS-COV-2 N GENE NPH QL NAA+PROBE: NOT DETECTED

## 2022-11-07 ENCOUNTER — TRANSCRIPTION ENCOUNTER (OUTPATIENT)
Age: 32
End: 2022-11-07

## 2022-11-08 ENCOUNTER — APPOINTMENT (OUTPATIENT)
Dept: TRAUMA SURGERY | Facility: HOSPITAL | Age: 32
End: 2022-11-08

## 2022-11-08 ENCOUNTER — TRANSCRIPTION ENCOUNTER (OUTPATIENT)
Age: 32
End: 2022-11-08

## 2022-11-08 ENCOUNTER — OUTPATIENT (OUTPATIENT)
Dept: OUTPATIENT SERVICES | Facility: HOSPITAL | Age: 32
LOS: 1 days | Discharge: HOME | End: 2022-11-08

## 2022-11-08 ENCOUNTER — RESULT REVIEW (OUTPATIENT)
Age: 32
End: 2022-11-08

## 2022-11-08 VITALS
HEART RATE: 87 BPM | HEIGHT: 62 IN | SYSTOLIC BLOOD PRESSURE: 134 MMHG | TEMPERATURE: 98 F | DIASTOLIC BLOOD PRESSURE: 78 MMHG | WEIGHT: 218.04 LBS | RESPIRATION RATE: 17 BRPM | OXYGEN SATURATION: 99 %

## 2022-11-08 VITALS
HEART RATE: 99 BPM | DIASTOLIC BLOOD PRESSURE: 61 MMHG | RESPIRATION RATE: 18 BRPM | OXYGEN SATURATION: 99 % | SYSTOLIC BLOOD PRESSURE: 108 MMHG

## 2022-11-08 DIAGNOSIS — K80.50 CALCULUS OF BILE DUCT WITHOUT CHOLANGITIS OR CHOLECYSTITIS WITHOUT OBSTRUCTION: ICD-10-CM

## 2022-11-08 DIAGNOSIS — Z98.890 OTHER SPECIFIED POSTPROCEDURAL STATES: Chronic | ICD-10-CM

## 2022-11-08 DIAGNOSIS — C56.9 MALIGNANT NEOPLASM OF UNSPECIFIED OVARY: Chronic | ICD-10-CM

## 2022-11-08 PROCEDURE — 47562 LAPAROSCOPIC CHOLECYSTECTOMY: CPT | Mod: GC

## 2022-11-08 DEVICE — LIGATING CLIPS WECK HEMOLOK POLYMER MEDIUM-LARGE (GREEN) 6: Type: IMPLANTABLE DEVICE | Status: FUNCTIONAL

## 2022-11-08 RX ORDER — METOCLOPRAMIDE HCL 10 MG
10 TABLET ORAL ONCE
Refills: 0 | Status: DISCONTINUED | OUTPATIENT
Start: 2022-11-08 | End: 2022-11-22

## 2022-11-08 RX ORDER — ONDANSETRON 8 MG/1
4 TABLET, FILM COATED ORAL ONCE
Refills: 0 | Status: COMPLETED | OUTPATIENT
Start: 2022-11-08 | End: 2022-11-08

## 2022-11-08 RX ORDER — HYDROMORPHONE HYDROCHLORIDE 2 MG/ML
0.5 INJECTION INTRAMUSCULAR; INTRAVENOUS; SUBCUTANEOUS
Refills: 0 | Status: DISCONTINUED | OUTPATIENT
Start: 2022-11-08 | End: 2022-11-08

## 2022-11-08 RX ORDER — OXYCODONE HYDROCHLORIDE 5 MG/1
1 TABLET ORAL
Qty: 12 | Refills: 0
Start: 2022-11-08

## 2022-11-08 RX ORDER — DIPHENHYDRAMINE HCL 50 MG
10 CAPSULE ORAL ONCE
Refills: 0 | Status: DISCONTINUED | OUTPATIENT
Start: 2022-11-08 | End: 2022-11-22

## 2022-11-08 RX ORDER — DIPHENHYDRAMINE HCL 50 MG
10 CAPSULE ORAL ONCE
Refills: 0 | Status: COMPLETED | OUTPATIENT
Start: 2022-11-08 | End: 2022-11-08

## 2022-11-08 RX ORDER — SODIUM CHLORIDE 9 MG/ML
1000 INJECTION, SOLUTION INTRAVENOUS ONCE
Refills: 0 | Status: DISCONTINUED | OUTPATIENT
Start: 2022-11-08 | End: 2022-11-22

## 2022-11-08 RX ADMIN — Medication 10 MILLIGRAM(S): at 18:39

## 2022-11-08 RX ADMIN — ONDANSETRON 4 MILLIGRAM(S): 8 TABLET, FILM COATED ORAL at 17:29

## 2022-11-08 NOTE — ASU DISCHARGE PLAN (ADULT/PEDIATRIC) - CARE PROVIDER_API CALL
Stanislav Nguyen)  Surgery; Surgical Critical Care  [address], [address]  [place] [address]  Phone: [phone]  Fax: [phone]  Follow Up Time:

## 2022-11-08 NOTE — ASU DISCHARGE PLAN (ADULT/PEDIATRIC) - NS MD DC FALL RISK RISK
For information on Fall & Injury Prevention, visit: [email or web address] OR  [email or web address] OR  https://www.cdc.gov/steadi/patient.html

## 2022-11-08 NOTE — ASU DISCHARGE PLAN (ADULT/PEDIATRIC) - ASU DC SPECIAL INSTRUCTIONSFT
BATHING: Please do not submerge wound underwater. You may shower and/or sponge bathe.  ACTIVITY: No heavy lifting anything more than 10-15lbs or straining. Otherwise, you may return to your usual level of physical activity. If you are taking narcotic pain medication (such as Percocet), do NOT drive a car, operate machinery or make important decisions.  PAIN CONTROL: For pain you may take Tylenol (extra strength if needed) every 6 hours, alternating with Motrin (if needed) every 6 hours. You may take Oxycodone if you have severe pain. Please call the office if your pain is not controlled or if the pain is getting worse.  DIET: Regular  MEDICATIONS: You may resume your regular medications.   NOTIFY YOUR SURGEON IF: You have any bleeding that does not stop, any pus draining from your wound, any fever (over 100.4 F) or chills, persistent nausea/vomiting with inability to tolerate food or liquids, persistent diarrhea, or if your pain is not controlled on your discharge pain medications.  FOLLOW-UP:  1. Please call to make a follow-up appointment after discharge   2. Please follow up with your primary care physician in one week regarding your hospitalization.

## 2022-11-10 RX ORDER — OXYCODONE HYDROCHLORIDE 5 MG/1
1 TABLET ORAL
Qty: 12 | Refills: 0
Start: 2022-11-10

## 2022-11-14 ENCOUNTER — APPOINTMENT (OUTPATIENT)
Dept: HEMATOLOGY ONCOLOGY | Facility: CLINIC | Age: 32
End: 2022-11-14

## 2022-11-18 ENCOUNTER — RESULT REVIEW (OUTPATIENT)
Age: 32
End: 2022-11-18

## 2022-11-18 ENCOUNTER — APPOINTMENT (OUTPATIENT)
Dept: TRAUMA SURGERY | Facility: HOSPITAL | Age: 32
End: 2022-11-18

## 2022-11-18 ENCOUNTER — APPOINTMENT (OUTPATIENT)
Dept: INFUSION THERAPY | Facility: HOSPITAL | Age: 32
End: 2022-11-18

## 2022-11-18 ENCOUNTER — APPOINTMENT (OUTPATIENT)
Dept: HEMATOLOGY ONCOLOGY | Facility: CLINIC | Age: 32
End: 2022-11-18
Payer: MEDICAID

## 2022-11-18 VITALS
WEIGHT: 219.58 LBS | TEMPERATURE: 97.2 F | DIASTOLIC BLOOD PRESSURE: 80 MMHG | SYSTOLIC BLOOD PRESSURE: 114 MMHG | RESPIRATION RATE: 16 BRPM | OXYGEN SATURATION: 96 % | HEART RATE: 96 BPM | BODY MASS INDEX: 41.49 KG/M2

## 2022-11-18 VITALS
TEMPERATURE: 97.5 F | SYSTOLIC BLOOD PRESSURE: 117 MMHG | WEIGHT: 220 LBS | HEART RATE: 97 BPM | BODY MASS INDEX: 41.54 KG/M2 | DIASTOLIC BLOOD PRESSURE: 82 MMHG | HEIGHT: 61 IN

## 2022-11-18 LAB
HCT VFR BLD CALC: 36.4 % — SIGNIFICANT CHANGE UP (ref 34.5–45)
HGB BLD-MCNC: 11.7 G/DL — SIGNIFICANT CHANGE UP (ref 11.5–15.5)
MCHC RBC-ENTMCNC: 27.7 PG — SIGNIFICANT CHANGE UP (ref 27–34)
MCHC RBC-ENTMCNC: 32.1 G/DL — SIGNIFICANT CHANGE UP (ref 32–36)
MCV RBC AUTO: 86.3 FL — SIGNIFICANT CHANGE UP (ref 80–100)
PLATELET # BLD AUTO: 262 K/UL — SIGNIFICANT CHANGE UP (ref 150–400)
RBC # BLD: 4.22 M/UL — SIGNIFICANT CHANGE UP (ref 3.8–5.2)
RBC # FLD: 13.2 % — SIGNIFICANT CHANGE UP (ref 10.3–14.5)
SURGICAL PATHOLOGY STUDY: SIGNIFICANT CHANGE UP
WBC # BLD: 12.34 K/UL — HIGH (ref 3.8–10.5)
WBC # FLD AUTO: 12.34 K/UL — HIGH (ref 3.8–10.5)

## 2022-11-18 PROCEDURE — 99024 POSTOP FOLLOW-UP VISIT: CPT

## 2022-11-18 PROCEDURE — 99214 OFFICE O/P EST MOD 30 MIN: CPT

## 2022-11-18 NOTE — PLAN
[FreeTextEntry1] : Neli underwent a laparoscopic cholecystectomy and has recovered well. The incisions are well healed and good PO nutrition is being tolerated. The pathology revealed no evidence of malignancy. \par \par Resume normal activity with gradual resumption of strenuous activity\par Avoid fatty foods. Low fat diet should diarrhea develop\par Follow up with me in clinic if yellowing of skin develops or fever/chills and RUQ pain develops. \par \par Stanislav Nguyen MD\par Acute Care Surgery\par

## 2022-11-18 NOTE — PHYSICAL EXAM
[de-identified] : Well ,comfortable. [de-identified] : Soft, nontender, nondistended. The incisions are well healed without signs of erythema, cellulitis or drainage. No palpable hernia formation.\par \par

## 2022-11-19 LAB
ALBUMIN SERPL ELPH-MCNC: 4.6 G/DL — SIGNIFICANT CHANGE UP (ref 3.3–5)
ALP SERPL-CCNC: 121 U/L — HIGH (ref 40–120)
ALT FLD-CCNC: 39 U/L — SIGNIFICANT CHANGE UP (ref 10–45)
ANION GAP SERPL CALC-SCNC: 13 MMOL/L — SIGNIFICANT CHANGE UP (ref 5–17)
AST SERPL-CCNC: 25 U/L — SIGNIFICANT CHANGE UP (ref 10–40)
BILIRUB SERPL-MCNC: 0.3 MG/DL — SIGNIFICANT CHANGE UP (ref 0.2–1.2)
BUN SERPL-MCNC: 11 MG/DL — SIGNIFICANT CHANGE UP (ref 7–23)
CALCIUM SERPL-MCNC: 9.8 MG/DL — SIGNIFICANT CHANGE UP (ref 8.4–10.5)
CANCER AG125 SERPL-ACNC: 8 U/ML — SIGNIFICANT CHANGE UP
CHLORIDE SERPL-SCNC: 100 MMOL/L — SIGNIFICANT CHANGE UP (ref 96–108)
CO2 SERPL-SCNC: 24 MMOL/L — SIGNIFICANT CHANGE UP (ref 22–31)
CREAT SERPL-MCNC: 0.63 MG/DL — SIGNIFICANT CHANGE UP (ref 0.5–1.3)
EGFR: 122 ML/MIN/1.73M2 — SIGNIFICANT CHANGE UP
GLUCOSE SERPL-MCNC: 65 MG/DL — LOW (ref 70–99)
MAGNESIUM SERPL-MCNC: 1.8 MG/DL — SIGNIFICANT CHANGE UP (ref 1.6–2.6)
POTASSIUM SERPL-MCNC: 4.6 MMOL/L — SIGNIFICANT CHANGE UP (ref 3.5–5.3)
POTASSIUM SERPL-SCNC: 4.6 MMOL/L — SIGNIFICANT CHANGE UP (ref 3.5–5.3)
PROT SERPL-MCNC: 7.8 G/DL — SIGNIFICANT CHANGE UP (ref 6–8.3)
SODIUM SERPL-SCNC: 137 MMOL/L — SIGNIFICANT CHANGE UP (ref 135–145)

## 2022-12-01 ENCOUNTER — APPOINTMENT (OUTPATIENT)
Dept: HEMATOLOGY ONCOLOGY | Facility: CLINIC | Age: 32
End: 2022-12-01

## 2022-12-01 PROCEDURE — 90834 PSYTX W PT 45 MINUTES: CPT | Mod: 95

## 2022-12-05 ENCOUNTER — APPOINTMENT (OUTPATIENT)
Dept: ORTHOPEDIC SURGERY | Facility: CLINIC | Age: 32
End: 2022-12-05

## 2022-12-05 VITALS — HEIGHT: 61 IN | BODY MASS INDEX: 41.35 KG/M2 | WEIGHT: 219 LBS

## 2022-12-05 DIAGNOSIS — M22.02 RECURRENT DISLOCATION OF PATELLA, LEFT KNEE: ICD-10-CM

## 2022-12-05 PROCEDURE — 99204 OFFICE O/P NEW MOD 45 MIN: CPT

## 2022-12-07 NOTE — COUNSELING
Anesthesia Evaluation     Patient summary reviewed and Nursing notes reviewed   no history of anesthetic complications:  NPO Solid Status: > 8 hours  NPO Liquid Status: > 2 hours           Airway   Mallampati: III  TM distance: >3 FB  Neck ROM: full  No difficulty expected  Dental      Pulmonary - negative pulmonary ROS and normal exam    breath sounds clear to auscultation  Cardiovascular - negative cardio ROS and normal exam  Exercise tolerance: good (4-7 METS)    Rhythm: regular  Rate: normal        Neuro/Psych- negative ROS  GI/Hepatic/Renal/Endo - negative ROS     Musculoskeletal (-) negative ROS    Abdominal    Substance History - negative use     OB/GYN negative ob/gyn ROS         Other - negative ROS       ROS/Med Hx Other: PAT Nursing Notes unavailable.                   Anesthesia Plan    ASA 1     general     (Total IV Anesthesia    Patient understands anesthesia not responsible for dental damage.  )  intravenous induction     Anesthetic plan, risks, benefits, and alternatives have been provided, discussed and informed consent has been obtained with: patient.    Plan discussed with CRNA.        CODE STATUS:        [Nutrition/ Exercise/ Weight Management] : nutrition, exercise, weight management [Breast Self Exam] : breast self exam

## 2022-12-14 RX ORDER — ALBUTEROL SULFATE 2.5 MG/3ML
(2.5 MG/3ML) SOLUTION RESPIRATORY (INHALATION)
Qty: 3 | Refills: 3 | Status: ACTIVE | COMMUNITY
Start: 2022-12-14 | End: 1900-01-01

## 2022-12-14 RX ORDER — SOFT LENS DISINFECTANT
SOLUTION, NON-ORAL MISCELLANEOUS
Qty: 1 | Refills: 0 | Status: ACTIVE | COMMUNITY
Start: 2022-12-14 | End: 1900-01-01

## 2022-12-16 ENCOUNTER — RX RENEWAL (OUTPATIENT)
Age: 32
End: 2022-12-16

## 2022-12-21 ENCOUNTER — NON-APPOINTMENT (OUTPATIENT)
Age: 32
End: 2022-12-21

## 2022-12-21 ENCOUNTER — APPOINTMENT (OUTPATIENT)
Dept: GYNECOLOGIC ONCOLOGY | Facility: CLINIC | Age: 32
End: 2022-12-21

## 2022-12-29 NOTE — ASU PATIENT PROFILE, ADULT - TEACHING/LEARNING OTHER LEARNERS
Occupational Therapy   Initial Evaluation     Patient Name: April Alicia  Age:  63 y.o., Sex:  female  Medical Record #: [id number]  Today's Date: 12/29/2022     Precautions  Precautions: Weight Bearing As Tolerated Left Lower Extremity, Immobilizer Left Lower Extremity    Assessment  Patient is 63 y.o. female with a diagnosis of L TKA with spacer insert.   Pt currently limited by decreased functional mobility, activity tolerance, strength, balance, and pain which are affecting pt's ability to complete ADLs/IADLs at baseline. Pt would benefit from OT services in the acute care setting to maximize functional recovery.       Plan     Pt to be seen 4x/wk to address the above deficits.                12/29/22 1054   Prior Living Situation   Prior Services None   Housing / Facility 1 Story House   Steps Into Home 2   Steps In Home 0   Equipment Owned Front-Wheel Walker;Single Point Cane;Wheelchair   Lives with - Patient's Self Care Capacity Significant Other;Adult Children   Prior Level of ADL Function   Self Feeding Independent   Grooming / Hygiene Independent   Bathing Independent   Dressing Independent   Toileting Independent   ADL Assessment   Grooming Minimal Assist   Upper Body Dressing Minimal Assist   Lower Body Dressing Total Assist   Toileting Total Assist   Functional Mobility   Sit to Stand Maximal Assist  (x2)   Bed, Chair, Wheelchair Transfer Unable to Participate   Short Term Goals   Short Term Goal # 1 supervised with UB dressing   Short Term Goal # 2 min A with LB dressing   Short Term Goal # 3 min A with ADL txfs          significant other

## 2022-12-30 NOTE — HISTORY OF PRESENT ILLNESS
[Disease: _____________________] : Disease: [unfilled] [AJCC Stage: ____] : AJCC Stage: [unfilled] [de-identified] : Referred by Dr. Magalys Fraire\par \par Ms. 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  \par \par Pt. states she developed abdominal pain about a year ago. She presented to GYN in [place] and was advised she had a cyst on each ovary. She was prescribed birth control and advised to follow up in 3 months. She followed up in 3 months and was advised that cysts had become larger but there was nothing to do at that time and that should she experience severe pain to follow up with ER because this could signify cyst rupture. Patient decided to stop birth control pills because she was experiencing migraines, which stopped after stopping meds. \par \par Patient states she did go to the  ER in [place] because of severe pain and workup was normal and was discharged. She then traveled to [facility] in February and presented to ED there for severe pain. She was advised she had endometriosis and advised to follow up outpatient. Her insurance did not cover her primary GYN and was then referred to Dr. Christensen. \par \par She was subsequently referred to Dr. Fraire for gyn onc evaluation and underwent RA-laparoscopic LSO and R ovarian cystectomy on 6/15/21, followed by exp lap, LINCOLN-RSO, omentectomy, and optimal tumor debulking surgery (7/6/21). \par \par Final pathology:\par Final Diagnosis\par 1. Uterus, cervix and right ovary and fallopian tube (total hysterectomy and salpingo-oophorectomy):\par - Focal low-grade micropapillary serous carcinoma (best seen on slide 1O) arising in a borderline tumor of the ovary.\par - The overall size of the ovarian tumor is 2.5 x 2.0 x 1.2 cm.\par - Involvement of the ovarian surface by borderline tumor is identified.\par - Small invasive implants involving the ovary (slides 1M-O), uterine serosa (slide 1G), and the fallopian tube.\par - Chronic cervicitis and squamous metaplasia.\par - Secretory endometrium.\par - Uterine serosal adhesion and tubo-ovarian adhesion.\par - Post-surgery changes of the ovary.\par - Benign paratubal cysts.\par \par 2. 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 She recently had surgery, cholecystectomy on 11/8/2022 and is recovering well. She is back to eating a normal diet, reports mild heart burn. Patient is feeling well overall and staying active. Reports regular bowel movements. She is taking the gabapentin for the neuropathy. She is currently on letrozole, she is tolerating well without any significant side effects. She is using the medical cannabis and helps with muscle cramping in b/l hands. Denies fever, chills, mouth sores, CP, palpitations, cough, HOWARD, n/v/d/c, abdominal pain, LE edema, vaginal discharge or bleeding, urinary symptoms, excessive bruising, dizziness, headaches, arthralgias, myalgias.  \par \par \par

## 2022-12-30 NOTE — PHYSICAL EXAM
[Fully active, able to carry on all pre-disease performance without restriction] : Status 0 - Fully active, able to carry on all pre-disease performance without restriction [Normal] : affect appropriate [de-identified] : vertical abdominal incision site c/d/i, healing well. Nontender, no HSM

## 2023-01-11 ENCOUNTER — OUTPATIENT (OUTPATIENT)
Dept: OUTPATIENT SERVICES | Facility: HOSPITAL | Age: 33
LOS: 1 days | Discharge: HOME | End: 2023-01-11

## 2023-01-11 DIAGNOSIS — C56.9 MALIGNANT NEOPLASM OF UNSPECIFIED OVARY: Chronic | ICD-10-CM

## 2023-01-11 DIAGNOSIS — C56.9 MALIGNANT NEOPLASM OF UNSPECIFIED OVARY: ICD-10-CM

## 2023-01-11 DIAGNOSIS — Z98.890 OTHER SPECIFIED POSTPROCEDURAL STATES: Chronic | ICD-10-CM

## 2023-01-17 ENCOUNTER — APPOINTMENT (OUTPATIENT)
Dept: HEMATOLOGY ONCOLOGY | Facility: CLINIC | Age: 33
End: 2023-01-17
Payer: MEDICAID

## 2023-01-17 ENCOUNTER — APPOINTMENT (OUTPATIENT)
Dept: HEMATOLOGY ONCOLOGY | Facility: CLINIC | Age: 33
End: 2023-01-17

## 2023-01-17 PROCEDURE — 90834 PSYTX W PT 45 MINUTES: CPT | Mod: 95

## 2023-01-18 ENCOUNTER — APPOINTMENT (OUTPATIENT)
Dept: INFUSION THERAPY | Facility: HOSPITAL | Age: 33
End: 2023-01-18

## 2023-01-18 ENCOUNTER — RESULT REVIEW (OUTPATIENT)
Age: 33
End: 2023-01-18

## 2023-01-18 ENCOUNTER — APPOINTMENT (OUTPATIENT)
Dept: GYNECOLOGIC ONCOLOGY | Facility: CLINIC | Age: 33
End: 2023-01-18
Payer: MEDICAID

## 2023-01-18 VITALS
HEART RATE: 93 BPM | BODY MASS INDEX: 41.35 KG/M2 | DIASTOLIC BLOOD PRESSURE: 84 MMHG | HEIGHT: 61 IN | SYSTOLIC BLOOD PRESSURE: 117 MMHG | WEIGHT: 219 LBS

## 2023-01-18 LAB
ALBUMIN SERPL ELPH-MCNC: 4.4 G/DL — SIGNIFICANT CHANGE UP (ref 3.3–5)
ALP SERPL-CCNC: 117 U/L — SIGNIFICANT CHANGE UP (ref 40–120)
ALT FLD-CCNC: 46 U/L — HIGH (ref 10–45)
ANION GAP SERPL CALC-SCNC: 12 MMOL/L — SIGNIFICANT CHANGE UP (ref 5–17)
AST SERPL-CCNC: 35 U/L — SIGNIFICANT CHANGE UP (ref 10–40)
BILIRUB SERPL-MCNC: 0.3 MG/DL — SIGNIFICANT CHANGE UP (ref 0.2–1.2)
BUN SERPL-MCNC: 12 MG/DL — SIGNIFICANT CHANGE UP (ref 7–23)
CALCIUM SERPL-MCNC: 9.7 MG/DL — SIGNIFICANT CHANGE UP (ref 8.4–10.5)
CANCER AG125 SERPL-ACNC: 8 U/ML — SIGNIFICANT CHANGE UP
CHLORIDE SERPL-SCNC: 103 MMOL/L — SIGNIFICANT CHANGE UP (ref 96–108)
CO2 SERPL-SCNC: 26 MMOL/L — SIGNIFICANT CHANGE UP (ref 22–31)
CREAT SERPL-MCNC: 0.71 MG/DL — SIGNIFICANT CHANGE UP (ref 0.5–1.3)
EGFR: 116 ML/MIN/1.73M2 — SIGNIFICANT CHANGE UP
GLUCOSE SERPL-MCNC: 81 MG/DL — SIGNIFICANT CHANGE UP (ref 70–99)
HCT VFR BLD CALC: 37 % — SIGNIFICANT CHANGE UP (ref 34.5–45)
HGB BLD-MCNC: 11.9 G/DL — SIGNIFICANT CHANGE UP (ref 11.5–15.5)
MCHC RBC-ENTMCNC: 27.8 PG — SIGNIFICANT CHANGE UP (ref 27–34)
MCHC RBC-ENTMCNC: 32.2 G/DL — SIGNIFICANT CHANGE UP (ref 32–36)
MCV RBC AUTO: 86.4 FL — SIGNIFICANT CHANGE UP (ref 80–100)
PLATELET # BLD AUTO: 260 K/UL — SIGNIFICANT CHANGE UP (ref 150–400)
POTASSIUM SERPL-MCNC: 4.1 MMOL/L — SIGNIFICANT CHANGE UP (ref 3.5–5.3)
POTASSIUM SERPL-SCNC: 4.1 MMOL/L — SIGNIFICANT CHANGE UP (ref 3.5–5.3)
PROT SERPL-MCNC: 8.1 G/DL — SIGNIFICANT CHANGE UP (ref 6–8.3)
RBC # BLD: 4.28 M/UL — SIGNIFICANT CHANGE UP (ref 3.8–5.2)
RBC # FLD: 13.2 % — SIGNIFICANT CHANGE UP (ref 10.3–14.5)
SODIUM SERPL-SCNC: 141 MMOL/L — SIGNIFICANT CHANGE UP (ref 135–145)
WBC # BLD: 8.58 K/UL — SIGNIFICANT CHANGE UP (ref 3.8–10.5)
WBC # FLD AUTO: 8.58 K/UL — SIGNIFICANT CHANGE UP (ref 3.8–10.5)

## 2023-01-18 PROCEDURE — 99214 OFFICE O/P EST MOD 30 MIN: CPT

## 2023-01-18 NOTE — HISTORY OF PRESENT ILLNESS
[FreeTextEntry1] : GYN/Ref: Erica Christensen MD\par PCP: Shilpi Garcia MD\par \par Ms. Pérez, 32 years old, referred by Amparo is s/p ex lap, LINCOLN-RSO, omentectomy and CUSA ablation and optimal tumor debulking for a Stage III low grade ovarian serous carcinoma on 7/6/21.\par \par Prior surgery: 6/15/21 - Robotic LSO, right ovarian cystectomy, pelvic washing and tumor debulking. \par \par GYN Oncology Tumor Board Discussion of 6/23/21: Diagnosis: Stage IIIC low grade serous ovarian carcinoma Plan: completion of surgical staging, chemotherapy with AI maintenance. \par Patient is s/p recent cholecystectomy with Dr. Ruiz. \par Intraop - no new findings, no masses noted\par residual omentum was clear\par  \par Started adjuvant carbo/taxol on 8/11/21 and completed her last C6 cycle inpatient due to reactions.  \par \par [employer] genetic panel: MSH6 [id number]>C (VUS)\par \par Labs- \par 8/17/22 = 7\par 11/18/22 = 8\par \par \par Imaging:  \par 5/4/22 CT abdomen / pelvis:\par -  Interval decrease in size of a small soft tissue nodule, possibly a lymph node, in the epigastric region.\par -  Hysterectomy and oophorectomy.\par -  No CT evidence of recurrent or metastatic disease.\par \par Last GYN Oncology Tumor Board in February, 2022 noted that patient has a persistent nodule along the greater curvature of the stomach and recommends an aromatase inhibitor, observation of nodule and repeat imaging in 3 months. Patient feeling well today, no pain, some neuropathy. She is sad about her father's death but feels she has support. Plans to return to school in August.\par

## 2023-01-18 NOTE — DISCUSSION/SUMMARY
[FreeTextEntry1] : Patient presenting for f/u, feeling well, no new complaints\par feels she is gaining weight, doing exercise and watching diet \par last CT 9/22 at [facility] -  no new findings, had cholecystitis, s/p poncho\par f/u 3months\par return to Dr. Lopez\par

## 2023-01-18 NOTE — REASON FOR VISIT
[FreeTextEntry1] : Follow up cancer surveillance visit for Stage IIIC low grade serous ovarian carcinoma\par

## 2023-01-24 ENCOUNTER — NON-APPOINTMENT (OUTPATIENT)
Age: 33
End: 2023-01-24

## 2023-02-14 ENCOUNTER — RESULT REVIEW (OUTPATIENT)
Age: 33
End: 2023-02-14

## 2023-02-14 ENCOUNTER — APPOINTMENT (OUTPATIENT)
Dept: HEMATOLOGY ONCOLOGY | Facility: CLINIC | Age: 33
End: 2023-02-14
Payer: MEDICAID

## 2023-02-14 DIAGNOSIS — F43.23 ADJUSTMENT DISORDER WITH MIXED ANXIETY AND DEPRESSED MOOD: ICD-10-CM

## 2023-02-14 PROCEDURE — 90834 PSYTX W PT 45 MINUTES: CPT | Mod: 95

## 2023-03-15 ENCOUNTER — APPOINTMENT (OUTPATIENT)
Dept: CT IMAGING | Facility: CLINIC | Age: 33
End: 2023-03-15
Payer: MEDICAID

## 2023-03-15 ENCOUNTER — OUTPATIENT (OUTPATIENT)
Dept: OUTPATIENT SERVICES | Facility: HOSPITAL | Age: 33
LOS: 1 days | End: 2023-03-15

## 2023-03-15 ENCOUNTER — APPOINTMENT (OUTPATIENT)
Dept: DERMATOLOGY | Facility: CLINIC | Age: 33
End: 2023-03-15
Payer: MEDICAID

## 2023-03-15 DIAGNOSIS — L30.0 NUMMULAR DERMATITIS: ICD-10-CM

## 2023-03-15 DIAGNOSIS — L75.0 BROMHIDROSIS: ICD-10-CM

## 2023-03-15 DIAGNOSIS — Z98.890 OTHER SPECIFIED POSTPROCEDURAL STATES: Chronic | ICD-10-CM

## 2023-03-15 DIAGNOSIS — C56.9 MALIGNANT NEOPLASM OF UNSPECIFIED OVARY: Chronic | ICD-10-CM

## 2023-03-15 PROCEDURE — 74177 CT ABD & PELVIS W/CONTRAST: CPT | Mod: 26

## 2023-03-15 PROCEDURE — 99214 OFFICE O/P EST MOD 30 MIN: CPT

## 2023-03-15 RX ORDER — MOMETASONE FUROATE 1 MG/G
0.1 CREAM TOPICAL
Qty: 1 | Refills: 0 | Status: ACTIVE | COMMUNITY
Start: 2023-03-15 | End: 1900-01-01

## 2023-03-15 NOTE — HISTORY OF PRESENT ILLNESS
[FreeTextEntry1] : FU nails, acne [de-identified] : estab hx ovarian ca s/p hysterectomy and oopherectomy and carbo/pac \par on letrozole with development of acne\par given bp, clinda, tret in nov - using q2 nights some dryness\par using bp in pm\par increased breakouts from stress\par \par \par

## 2023-03-15 NOTE — PHYSICAL EXAM
[Alert] : alert [Oriented x 3] : ~L oriented x 3 [Well Nourished] : well nourished [Conjunctiva Non-injected] : conjunctiva non-injected [No Visual Lymphadenopathy] : no visual  lymphadenopathy [No Clubbing] : no clubbing [No Edema] : no edema [No Bromhidrosis] : no bromhidrosis [No Chromhidrosis] : no chromhidrosis [FreeTextEntry3] : few resolving small cystic papules lower face \par nummular scaly thin plaque L chest

## 2023-03-17 ENCOUNTER — OUTPATIENT (OUTPATIENT)
Dept: OUTPATIENT SERVICES | Facility: HOSPITAL | Age: 33
LOS: 1 days | Discharge: HOME | End: 2023-03-17

## 2023-03-17 DIAGNOSIS — C56.9 MALIGNANT NEOPLASM OF UNSPECIFIED OVARY: Chronic | ICD-10-CM

## 2023-03-17 DIAGNOSIS — C56.9 MALIGNANT NEOPLASM OF UNSPECIFIED OVARY: ICD-10-CM

## 2023-03-17 DIAGNOSIS — Z98.890 OTHER SPECIFIED POSTPROCEDURAL STATES: Chronic | ICD-10-CM

## 2023-03-22 ENCOUNTER — APPOINTMENT (OUTPATIENT)
Dept: INFUSION THERAPY | Facility: HOSPITAL | Age: 33
End: 2023-03-22

## 2023-03-22 ENCOUNTER — APPOINTMENT (OUTPATIENT)
Dept: HEMATOLOGY ONCOLOGY | Facility: CLINIC | Age: 33
End: 2023-03-22

## 2023-03-30 NOTE — BRIEF OPERATIVE NOTE - OPERATION/FINDINGS
Patient called with c/o malodorous urine.  She already was in contact with VIJI Garzon.  She said that she has MS for 22 years, at least 60 UTIs in her life, and 6 UTIs in the last 6.5 months.  She said when she was on PCN she did the best with emptying.  She discussed with Sallie Otero PA-C about the possibility of prophylactic PCN.  Sallie would consider but recommended she discuss this with Dr. Ring. We reviewed notes and the most recent urine test results.  She is requesting immediate medication.  She is having an SP Tube placed per Dr. Ring next week.  After a long discussion with patient, I discussed this with Dr. Hall who is the physician covering the clinic while Dr. Chadwick and Sallie Otero PA-C are out of town.  Dr. Hall reviewed her records and will discuss with Dr. Ring.  We will let patient know what the decision is, likely tomorrow.  Patient updated.     Exam under anesthesia revealed small midposition uterus. R ovary enlarged. RV septum smooth.  Abdominal survey revealed atraumatic entry, clear liver edge.  Miliary implants were visualized throughout the peritoneal surface including subdiaphragmatic, posterior cul desac, and anterior abdominal wall.  Implants sent from omentum and pelvis to frozen.   Implant on R ovary sent for permanent  Omentum appeared thickened with miliary disease apparent  L ovary with large anterior mass with papillary excrescences  R ovary with multiple large cysts. Upon removal of cysts, similar tumor visualized within the R ovarian cyst.  Miliary disease visualized over uterine serosa.  Michel placed in surgical beds bilaterally  Intercede placed over surgical beds and anterior uterus    Frozen=mesothelial cells with atypia Exam under anesthesia revealed small midposition uterus. R ovary enlarged. RV septum smooth.  Abdominal survey revealed atraumatic entry, clear liver edge.  Miliary implants were visualized throughout the peritoneal surface including subdiaphragmatic, posterior cul desac, and anterior abdominal wall.  Implants sent from uterus and pelvis peritoneum to frozen.   Implant on R ovary sent for permanent  Omentum appeared thickened with miliary disease apparent  L ovary with large anterior mass with papillary excrescences  R ovary with multiple large cysts. Upon removal of cysts, similar tumor visualized within the R ovarian cyst.  Miliary disease visualized over uterine serosa.  Michel placed in surgical beds bilaterally  Intercede placed over surgical beds and anterior uterus    Frozen=mesothelial cells with atypia

## 2023-04-05 ENCOUNTER — APPOINTMENT (OUTPATIENT)
Dept: INTERNAL MEDICINE | Facility: CLINIC | Age: 33
End: 2023-04-05

## 2023-04-18 ENCOUNTER — NON-APPOINTMENT (OUTPATIENT)
Age: 33
End: 2023-04-18

## 2023-04-27 ENCOUNTER — RESULT REVIEW (OUTPATIENT)
Age: 33
End: 2023-04-27

## 2023-04-27 ENCOUNTER — APPOINTMENT (OUTPATIENT)
Dept: INFUSION THERAPY | Facility: HOSPITAL | Age: 33
End: 2023-04-27

## 2023-04-27 ENCOUNTER — APPOINTMENT (OUTPATIENT)
Dept: HEMATOLOGY ONCOLOGY | Facility: CLINIC | Age: 33
End: 2023-04-27
Payer: MEDICAID

## 2023-04-27 VITALS
DIASTOLIC BLOOD PRESSURE: 85 MMHG | WEIGHT: 217.13 LBS | SYSTOLIC BLOOD PRESSURE: 122 MMHG | OXYGEN SATURATION: 99 % | BODY MASS INDEX: 40.99 KG/M2 | HEART RATE: 98 BPM | RESPIRATION RATE: 17 BRPM | HEIGHT: 60.98 IN | TEMPERATURE: 97.2 F

## 2023-04-27 LAB
HCT VFR BLD CALC: 37.5 % — SIGNIFICANT CHANGE UP (ref 34.5–45)
HGB BLD-MCNC: 12.1 G/DL — SIGNIFICANT CHANGE UP (ref 11.5–15.5)
MCHC RBC-ENTMCNC: 27.9 PG — SIGNIFICANT CHANGE UP (ref 27–34)
MCHC RBC-ENTMCNC: 32.3 G/DL — SIGNIFICANT CHANGE UP (ref 32–36)
MCV RBC AUTO: 86.6 FL — SIGNIFICANT CHANGE UP (ref 80–100)
PLATELET # BLD AUTO: 270 K/UL — SIGNIFICANT CHANGE UP (ref 150–400)
RBC # BLD: 4.33 M/UL — SIGNIFICANT CHANGE UP (ref 3.8–5.2)
RBC # FLD: 13.2 % — SIGNIFICANT CHANGE UP (ref 10.3–14.5)
WBC # BLD: 9.76 K/UL — SIGNIFICANT CHANGE UP (ref 3.8–10.5)
WBC # FLD AUTO: 9.76 K/UL — SIGNIFICANT CHANGE UP (ref 3.8–10.5)

## 2023-04-27 PROCEDURE — 99214 OFFICE O/P EST MOD 30 MIN: CPT

## 2023-04-28 LAB
ALBUMIN SERPL ELPH-MCNC: 4.7 G/DL — SIGNIFICANT CHANGE UP (ref 3.3–5)
ALP SERPL-CCNC: 116 U/L — SIGNIFICANT CHANGE UP (ref 40–120)
ALT FLD-CCNC: 44 U/L — SIGNIFICANT CHANGE UP (ref 10–45)
ANION GAP SERPL CALC-SCNC: 16 MMOL/L — SIGNIFICANT CHANGE UP (ref 5–17)
AST SERPL-CCNC: 44 U/L — HIGH (ref 10–40)
BILIRUB SERPL-MCNC: 0.3 MG/DL — SIGNIFICANT CHANGE UP (ref 0.2–1.2)
BUN SERPL-MCNC: 10 MG/DL — SIGNIFICANT CHANGE UP (ref 7–23)
CALCIUM SERPL-MCNC: 10.2 MG/DL — SIGNIFICANT CHANGE UP (ref 8.4–10.5)
CHLORIDE SERPL-SCNC: 100 MMOL/L — SIGNIFICANT CHANGE UP (ref 96–108)
CO2 SERPL-SCNC: 24 MMOL/L — SIGNIFICANT CHANGE UP (ref 22–31)
CREAT SERPL-MCNC: 0.74 MG/DL — SIGNIFICANT CHANGE UP (ref 0.5–1.3)
EGFR: 110 ML/MIN/1.73M2 — SIGNIFICANT CHANGE UP
GLUCOSE SERPL-MCNC: 66 MG/DL — LOW (ref 70–99)
MAGNESIUM SERPL-MCNC: 1.6 MG/DL — SIGNIFICANT CHANGE UP (ref 1.6–2.6)
POTASSIUM SERPL-MCNC: 4.6 MMOL/L — SIGNIFICANT CHANGE UP (ref 3.5–5.3)
POTASSIUM SERPL-SCNC: 4.6 MMOL/L — SIGNIFICANT CHANGE UP (ref 3.5–5.3)
PROT SERPL-MCNC: 8.1 G/DL — SIGNIFICANT CHANGE UP (ref 6–8.3)
SODIUM SERPL-SCNC: 141 MMOL/L — SIGNIFICANT CHANGE UP (ref 135–145)

## 2023-05-10 ENCOUNTER — APPOINTMENT (OUTPATIENT)
Dept: GYNECOLOGIC ONCOLOGY | Facility: CLINIC | Age: 33
End: 2023-05-10

## 2023-05-21 NOTE — PHYSICAL EXAM
[Fully active, able to carry on all pre-disease performance without restriction] : Status 0 - Fully active, able to carry on all pre-disease performance without restriction [Normal] : affect appropriate [de-identified] : vertical abdominal incision site c/d/i, healing well. Nontender, no HSM

## 2023-05-21 NOTE — HISTORY OF PRESENT ILLNESS
[Disease: _____________________] : Disease: [unfilled] [AJCC Stage: ____] : AJCC Stage: [unfilled] [de-identified] : Referred by Dr. Magalys Fraire\par \par Ms. 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  \par \par Pt. states she developed abdominal pain about a year ago. She presented to GYN in [place] and was advised she had a cyst on each ovary. She was prescribed birth control and advised to follow up in 3 months. She followed up in 3 months and was advised that cysts had become larger but there was nothing to do at that time and that should she experience severe pain to follow up with ER because this could signify cyst rupture. Patient decided to stop birth control pills because she was experiencing migraines, which stopped after stopping meds. \par \par Patient states she did go to the  ER in [place] because of severe pain and workup was normal and was discharged. She then traveled to [facility] in February and presented to ED there for severe pain. She was advised she had endometriosis and advised to follow up outpatient. Her insurance did not cover her primary GYN and was then referred to Dr. Christensen. \par \par She was subsequently referred to Dr. Fraire for gyn onc evaluation and underwent RA-laparoscopic LSO and R ovarian cystectomy on 6/15/21, followed by exp lap, LINCOLN-RSO, omentectomy, and optimal tumor debulking surgery (7/6/21). \par \par Final pathology:\par Final Diagnosis\par 1. Uterus, cervix and right ovary and fallopian tube (total hysterectomy and salpingo-oophorectomy):\par - Focal low-grade micropapillary serous carcinoma (best seen on slide 1O) arising in a borderline tumor of the ovary.\par - The overall size of the ovarian tumor is 2.5 x 2.0 x 1.2 cm.\par - Involvement of the ovarian surface by borderline tumor is identified.\par - Small invasive implants involving the ovary (slides 1M-O), uterine serosa (slide 1G), and the fallopian tube.\par - Chronic cervicitis and squamous metaplasia.\par - Secretory endometrium.\par - Uterine serosal adhesion and tubo-ovarian adhesion.\par - Post-surgery changes of the ovary.\par - Benign paratubal cysts.\par \par 2. Infragastric omentum (omentectomy):\par - Multiple noninvasive implants.\par - One (1) omental lymph node, negative for tumor.\par - Focal endosalpingiosis.\par \par GYN Oncology Tumor Board Consensus on 7/12/21: Clinical Trial, BRCA testing\par Diagnosis: Stage IIIB low grade serous ovarian carcinoma.\par \par 8/11/2021: Started adjuvant carbo/taxol  [de-identified] : [employer] genetic panel: MSH6 [id number] (VUS) [de-identified] : Patient presents for routine surveillance visit for LGSOC on maintenance Letrozole. Since last visit, CT scans performed that showed OSBALDO. Her grandfather recently passed away. She has been very stressed lately. Physically, she is feeling well and staying active. Her neuropathy has improved significantly and she has been off the gabapentin for 2 months. Reports regular bowel movements.. She is currently on letrozole, she is tolerating well without any significant side effects. She reports lower extremity edema. She is using the medical cannabis and helps with muscle cramping in b/l hands. Denies fever, chills, mouth sores, CP, palpitations, cough, HOAWRD, n/v/d/c, abdominal pain, vaginal discharge or bleeding, urinary symptoms, excessive bruising, dizziness, headaches, arthralgias, myalgias.  \par \par \par  [FreeTextEntry1] : surgery (7/6/21)  --> Adjuvant Carbo/Taxol started 8/11/2021 s/p cycle 6/6

## 2023-07-12 ENCOUNTER — APPOINTMENT (OUTPATIENT)
Dept: DERMATOLOGY | Facility: CLINIC | Age: 33
End: 2023-07-12
Payer: MEDICAID

## 2023-07-12 DIAGNOSIS — L85.8 OTHER SPECIFIED EPIDERMAL THICKENING: ICD-10-CM

## 2023-07-12 DIAGNOSIS — L27.0 GENERALIZED SKIN ERUPTION DUE TO DRUGS AND MEDICAMENTS TAKEN INTERNALLY: ICD-10-CM

## 2023-07-12 DIAGNOSIS — L70.8 OTHER ACNE: ICD-10-CM

## 2023-07-12 PROCEDURE — 99214 OFFICE O/P EST MOD 30 MIN: CPT

## 2023-07-12 RX ORDER — METRONIDAZOLE 7.5 MG/G
0.75 CREAM TOPICAL
Qty: 1 | Refills: 3 | Status: ACTIVE | COMMUNITY
Start: 2023-07-12 | End: 1900-01-01

## 2023-07-13 ENCOUNTER — APPOINTMENT (OUTPATIENT)
Dept: GYNECOLOGIC ONCOLOGY | Facility: CLINIC | Age: 33
End: 2023-07-13
Payer: MEDICAID

## 2023-07-13 VITALS
DIASTOLIC BLOOD PRESSURE: 83 MMHG | BODY MASS INDEX: 39.27 KG/M2 | WEIGHT: 208 LBS | HEART RATE: 80 BPM | SYSTOLIC BLOOD PRESSURE: 127 MMHG | HEIGHT: 60.98 IN

## 2023-07-13 PROBLEM — L27.0 DERMATITIS DUE TO DRUGS OR MEDICINES: Noted: 2021-11-19

## 2023-07-13 PROBLEM — L70.8 INFLAMMATORY ACNE: Status: ACTIVE | Noted: 2022-11-02

## 2023-07-13 PROBLEM — L85.8 KERATOSIS PILARIS: Status: ACTIVE | Noted: 2023-07-12

## 2023-07-13 PROCEDURE — 99214 OFFICE O/P EST MOD 30 MIN: CPT

## 2023-07-13 NOTE — HISTORY OF PRESENT ILLNESS
[FreeTextEntry1] : NELSON acne  [de-identified] : LV: 03/2023\par estab patient hx ovarian ca s/p hysterectomy and oopherectomy and carbo/pacon now on letrozole \par here for acne FU\par now using bp in am instead of bid, tret still 0.025 q2 otherwise dry, using cerave cream\par skin looks rough but acne ok\par \par

## 2023-07-13 NOTE — PHYSICAL EXAM
[Alert] : alert [Oriented x 3] : ~L oriented x 3 [Well Nourished] : well nourished [Conjunctiva Non-injected] : conjunctiva non-injected [No Visual Lymphadenopathy] : no visual  lymphadenopathy [No Clubbing] : no clubbing [No Edema] : no edema [No Bromhidrosis] : no bromhidrosis [No Chromhidrosis] : no chromhidrosis [FreeTextEntry3] : light brown skin\par one small pustule on the R cheek\par very fine white skin colored papules bilateral cheeks\par

## 2023-07-18 ENCOUNTER — OUTPATIENT (OUTPATIENT)
Dept: OUTPATIENT SERVICES | Facility: HOSPITAL | Age: 33
LOS: 1 days | Discharge: HOME | End: 2023-07-18

## 2023-07-18 DIAGNOSIS — C56.9 MALIGNANT NEOPLASM OF UNSPECIFIED OVARY: ICD-10-CM

## 2023-07-18 DIAGNOSIS — C56.9 MALIGNANT NEOPLASM OF UNSPECIFIED OVARY: Chronic | ICD-10-CM

## 2023-07-18 DIAGNOSIS — Z98.890 OTHER SPECIFIED POSTPROCEDURAL STATES: Chronic | ICD-10-CM

## 2023-07-26 ENCOUNTER — RESULT REVIEW (OUTPATIENT)
Age: 33
End: 2023-07-26

## 2023-07-26 ENCOUNTER — APPOINTMENT (OUTPATIENT)
Dept: INFUSION THERAPY | Facility: HOSPITAL | Age: 33
End: 2023-07-26

## 2023-07-26 ENCOUNTER — APPOINTMENT (OUTPATIENT)
Dept: HEMATOLOGY ONCOLOGY | Facility: CLINIC | Age: 33
End: 2023-07-26
Payer: MEDICAID

## 2023-07-26 VITALS
RESPIRATION RATE: 18 BRPM | HEART RATE: 79 BPM | WEIGHT: 211.2 LBS | DIASTOLIC BLOOD PRESSURE: 84 MMHG | TEMPERATURE: 97.2 F | OXYGEN SATURATION: 98 % | SYSTOLIC BLOOD PRESSURE: 120 MMHG | HEIGHT: 60.94 IN | BODY MASS INDEX: 39.88 KG/M2

## 2023-07-26 LAB
ALBUMIN SERPL ELPH-MCNC: 4.7 G/DL — SIGNIFICANT CHANGE UP (ref 3.3–5)
ALP SERPL-CCNC: 85 U/L — SIGNIFICANT CHANGE UP (ref 40–120)
ALT FLD-CCNC: 30 U/L — SIGNIFICANT CHANGE UP (ref 10–45)
ANION GAP SERPL CALC-SCNC: 13 MMOL/L — SIGNIFICANT CHANGE UP (ref 5–17)
AST SERPL-CCNC: 35 U/L — SIGNIFICANT CHANGE UP (ref 10–40)
BILIRUB SERPL-MCNC: 0.4 MG/DL — SIGNIFICANT CHANGE UP (ref 0.2–1.2)
BUN SERPL-MCNC: 15 MG/DL — SIGNIFICANT CHANGE UP (ref 7–23)
CALCIUM SERPL-MCNC: 9.9 MG/DL — SIGNIFICANT CHANGE UP (ref 8.4–10.5)
CANCER AG125 SERPL-ACNC: 7 U/ML — SIGNIFICANT CHANGE UP
CHLORIDE SERPL-SCNC: 102 MMOL/L — SIGNIFICANT CHANGE UP (ref 96–108)
CO2 SERPL-SCNC: 24 MMOL/L — SIGNIFICANT CHANGE UP (ref 22–31)
CREAT SERPL-MCNC: 0.71 MG/DL — SIGNIFICANT CHANGE UP (ref 0.5–1.3)
EGFR: 116 ML/MIN/1.73M2 — SIGNIFICANT CHANGE UP
GLUCOSE SERPL-MCNC: 85 MG/DL — SIGNIFICANT CHANGE UP (ref 70–99)
HCT VFR BLD CALC: 36.4 % — SIGNIFICANT CHANGE UP (ref 34.5–45)
HGB BLD-MCNC: 11.8 G/DL — SIGNIFICANT CHANGE UP (ref 11.5–15.5)
MCHC RBC-ENTMCNC: 27.8 PG — SIGNIFICANT CHANGE UP (ref 27–34)
MCHC RBC-ENTMCNC: 32.4 G/DL — SIGNIFICANT CHANGE UP (ref 32–36)
MCV RBC AUTO: 85.8 FL — SIGNIFICANT CHANGE UP (ref 80–100)
PLATELET # BLD AUTO: 227 K/UL — SIGNIFICANT CHANGE UP (ref 150–400)
POTASSIUM SERPL-MCNC: 3.9 MMOL/L — SIGNIFICANT CHANGE UP (ref 3.5–5.3)
POTASSIUM SERPL-SCNC: 3.9 MMOL/L — SIGNIFICANT CHANGE UP (ref 3.5–5.3)
PROT SERPL-MCNC: 8.4 G/DL — HIGH (ref 6–8.3)
RBC # BLD: 4.24 M/UL — SIGNIFICANT CHANGE UP (ref 3.8–5.2)
RBC # FLD: 12.8 % — SIGNIFICANT CHANGE UP (ref 10.3–14.5)
SODIUM SERPL-SCNC: 139 MMOL/L — SIGNIFICANT CHANGE UP (ref 135–145)
WBC # BLD: 7.84 K/UL — SIGNIFICANT CHANGE UP (ref 3.8–10.5)
WBC # FLD AUTO: 7.84 K/UL — SIGNIFICANT CHANGE UP (ref 3.8–10.5)

## 2023-07-26 PROCEDURE — 99213 OFFICE O/P EST LOW 20 MIN: CPT

## 2023-07-26 RX ORDER — LIDOCAINE AND PRILOCAINE 25; 25 MG/G; MG/G
2.5-2.5 CREAM TOPICAL
Qty: 1 | Refills: 1 | Status: ACTIVE | COMMUNITY
Start: 2023-07-26 | End: 1900-01-01

## 2023-07-26 NOTE — HISTORY OF PRESENT ILLNESS
[Disease: _____________________] : Disease: [unfilled] [AJCC Stage: ____] : AJCC Stage: [unfilled] [de-identified] : Referred by Dr. Magalys Fraire\par \par Ms. 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  \par \par Pt. states she developed abdominal pain about a year ago. She presented to GYN in [place] and was advised she had a cyst on each ovary. She was prescribed birth control and advised to follow up in 3 months. She followed up in 3 months and was advised that cysts had become larger but there was nothing to do at that time and that should she experience severe pain to follow up with ER because this could signify cyst rupture. Patient decided to stop birth control pills because she was experiencing migraines, which stopped after stopping meds. \par \par Patient states she did go to the  ER in [place] because of severe pain and workup was normal and was discharged. She then traveled to [facility] in February and presented to ED there for severe pain. She was advised she had endometriosis and advised to follow up outpatient. Her insurance did not cover her primary GYN and was then referred to Dr. Christensen. \par \par She was subsequently referred to Dr. Fraire for gyn onc evaluation and underwent RA-laparoscopic LSO and R ovarian cystectomy on 6/15/21, followed by exp lap, LINCOLN-RSO, omentectomy, and optimal tumor debulking surgery (7/6/21). \par \par Final pathology:\par Final Diagnosis\par 1. Uterus, cervix and right ovary and fallopian tube (total hysterectomy and salpingo-oophorectomy):\par - Focal low-grade micropapillary serous carcinoma (best seen on slide 1O) arising in a borderline tumor of the ovary.\par - The overall size of the ovarian tumor is 2.5 x 2.0 x 1.2 cm.\par - Involvement of the ovarian surface by borderline tumor is identified.\par - Small invasive implants involving the ovary (slides 1M-O), uterine serosa (slide 1G), and the fallopian tube.\par - Chronic cervicitis and squamous metaplasia.\par - Secretory endometrium.\par - Uterine serosal adhesion and tubo-ovarian adhesion.\par - Post-surgery changes of the ovary.\par - Benign paratubal cysts.\par \par 2. 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 In the interim, she was seen by Dr Fraire, exam normal. She is awaiting a GI appt for intermittent stomach pains and diarrhea/constipation. 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 Reports good appetite, eating and drinking well. Her neuropathy symptoms in her hands have completely resolved, the neuropathy in her feet persists. She is no longer taking the gabapentin because she does not want to take a lot of medications. The neuropathy in her feet has migrated to the back of her heels, using numbing spray to help with symptoms. She continues to use the medical cannabis for muscle cramping in b/l hands. Denies fever, chills, mouth sores, CP, palpitations, cough, HOWARD, n/v, LE edema, vaginal discharge or bleeding, urinary symptoms, excessive bruising, dizziness, headaches, arthralgias, myalgias.

## 2023-07-26 NOTE — REVIEW OF SYSTEMS
[Negative] : Allergic/Immunologic [Abdominal Pain] : abdominal pain [Constipation] : constipation [Recent Change In Weight] : ~T no recent weight change [SOB on Exertion] : no shortness of breath during exertion [Vomiting] : no vomiting [Difficulty Walking] : no difficulty walking [FreeTextEntry7] : diarrhea [FreeTextEntry9] : b/l heel pain/neuropathy

## 2023-09-22 ENCOUNTER — RESULT REVIEW (OUTPATIENT)
Age: 33
End: 2023-09-22

## 2023-10-05 ENCOUNTER — OUTPATIENT (OUTPATIENT)
Dept: OUTPATIENT SERVICES | Facility: HOSPITAL | Age: 33
LOS: 1 days | End: 2023-10-05

## 2023-10-05 ENCOUNTER — APPOINTMENT (OUTPATIENT)
Dept: CT IMAGING | Facility: CLINIC | Age: 33
End: 2023-10-05
Payer: MEDICAID

## 2023-10-05 DIAGNOSIS — C56.9 MALIGNANT NEOPLASM OF UNSPECIFIED OVARY: Chronic | ICD-10-CM

## 2023-10-05 DIAGNOSIS — Z98.890 OTHER SPECIFIED POSTPROCEDURAL STATES: Chronic | ICD-10-CM

## 2023-10-05 PROCEDURE — 74177 CT ABD & PELVIS W/CONTRAST: CPT | Mod: 26

## 2023-10-18 ENCOUNTER — APPOINTMENT (OUTPATIENT)
Dept: GYNECOLOGIC ONCOLOGY | Facility: CLINIC | Age: 33
End: 2023-10-18
Payer: MEDICAID

## 2023-10-18 VITALS
DIASTOLIC BLOOD PRESSURE: 77 MMHG | HEART RATE: 108 BPM | WEIGHT: 210.5 LBS | HEIGHT: 60.94 IN | SYSTOLIC BLOOD PRESSURE: 113 MMHG | BODY MASS INDEX: 39.74 KG/M2

## 2023-10-18 PROCEDURE — 99214 OFFICE O/P EST MOD 30 MIN: CPT

## 2023-10-24 ENCOUNTER — NON-APPOINTMENT (OUTPATIENT)
Age: 33
End: 2023-10-24

## 2023-10-25 ENCOUNTER — NON-APPOINTMENT (OUTPATIENT)
Age: 33
End: 2023-10-25

## 2023-11-01 ENCOUNTER — OUTPATIENT (OUTPATIENT)
Dept: OUTPATIENT SERVICES | Facility: HOSPITAL | Age: 33
LOS: 1 days | Discharge: HOME | End: 2023-11-01

## 2023-11-01 DIAGNOSIS — C56.9 MALIGNANT NEOPLASM OF UNSPECIFIED OVARY: ICD-10-CM

## 2023-11-01 DIAGNOSIS — Z98.890 OTHER SPECIFIED POSTPROCEDURAL STATES: Chronic | ICD-10-CM

## 2023-11-08 ENCOUNTER — APPOINTMENT (OUTPATIENT)
Dept: INFUSION THERAPY | Facility: HOSPITAL | Age: 33
End: 2023-11-08

## 2023-11-08 ENCOUNTER — RESULT REVIEW (OUTPATIENT)
Age: 33
End: 2023-11-08

## 2023-11-08 ENCOUNTER — APPOINTMENT (OUTPATIENT)
Dept: HEMATOLOGY ONCOLOGY | Facility: CLINIC | Age: 33
End: 2023-11-08
Payer: MEDICAID

## 2023-11-08 ENCOUNTER — APPOINTMENT (OUTPATIENT)
Dept: UROLOGY | Facility: CLINIC | Age: 33
End: 2023-11-08
Payer: MEDICAID

## 2023-11-08 VITALS
TEMPERATURE: 97.6 F | RESPIRATION RATE: 17 BRPM | HEART RATE: 75 BPM | SYSTOLIC BLOOD PRESSURE: 119 MMHG | DIASTOLIC BLOOD PRESSURE: 81 MMHG | HEIGHT: 61 IN

## 2023-11-08 VITALS — WEIGHT: 210 LBS | BODY MASS INDEX: 39.68 KG/M2

## 2023-11-08 VITALS
RESPIRATION RATE: 18 BRPM | SYSTOLIC BLOOD PRESSURE: 110 MMHG | HEART RATE: 97 BPM | HEIGHT: 60.98 IN | TEMPERATURE: 97.2 F | OXYGEN SATURATION: 98 % | DIASTOLIC BLOOD PRESSURE: 75 MMHG | BODY MASS INDEX: 39.96 KG/M2 | WEIGHT: 211.64 LBS

## 2023-11-08 DIAGNOSIS — Z71.3 DIETARY COUNSELING AND SURVEILLANCE: ICD-10-CM

## 2023-11-08 LAB
ALBUMIN SERPL ELPH-MCNC: 4.5 G/DL — SIGNIFICANT CHANGE UP (ref 3.3–5)
ALBUMIN SERPL ELPH-MCNC: 4.5 G/DL — SIGNIFICANT CHANGE UP (ref 3.3–5)
ALP SERPL-CCNC: 93 U/L — SIGNIFICANT CHANGE UP (ref 40–120)
ALP SERPL-CCNC: 93 U/L — SIGNIFICANT CHANGE UP (ref 40–120)
ALT FLD-CCNC: 30 U/L — SIGNIFICANT CHANGE UP (ref 4–33)
ALT FLD-CCNC: 30 U/L — SIGNIFICANT CHANGE UP (ref 4–33)
ANION GAP SERPL CALC-SCNC: 15 MMOL/L — HIGH (ref 7–14)
ANION GAP SERPL CALC-SCNC: 15 MMOL/L — HIGH (ref 7–14)
AST SERPL-CCNC: 29 U/L — SIGNIFICANT CHANGE UP (ref 4–32)
AST SERPL-CCNC: 29 U/L — SIGNIFICANT CHANGE UP (ref 4–32)
BILIRUB SERPL-MCNC: 0.3 MG/DL — SIGNIFICANT CHANGE UP (ref 0.2–1.2)
BILIRUB SERPL-MCNC: 0.3 MG/DL — SIGNIFICANT CHANGE UP (ref 0.2–1.2)
BUN SERPL-MCNC: 19 MG/DL — SIGNIFICANT CHANGE UP (ref 7–23)
BUN SERPL-MCNC: 19 MG/DL — SIGNIFICANT CHANGE UP (ref 7–23)
CALCIUM SERPL-MCNC: 10.1 MG/DL — SIGNIFICANT CHANGE UP (ref 8.4–10.5)
CALCIUM SERPL-MCNC: 10.1 MG/DL — SIGNIFICANT CHANGE UP (ref 8.4–10.5)
CHLORIDE SERPL-SCNC: 102 MMOL/L — SIGNIFICANT CHANGE UP (ref 98–107)
CHLORIDE SERPL-SCNC: 102 MMOL/L — SIGNIFICANT CHANGE UP (ref 98–107)
CO2 SERPL-SCNC: 22 MMOL/L — SIGNIFICANT CHANGE UP (ref 22–31)
CO2 SERPL-SCNC: 22 MMOL/L — SIGNIFICANT CHANGE UP (ref 22–31)
CREAT SERPL-MCNC: 0.69 MG/DL — SIGNIFICANT CHANGE UP (ref 0.5–1.3)
CREAT SERPL-MCNC: 0.69 MG/DL — SIGNIFICANT CHANGE UP (ref 0.5–1.3)
EGFR: 118 ML/MIN/1.73M2 — SIGNIFICANT CHANGE UP
EGFR: 118 ML/MIN/1.73M2 — SIGNIFICANT CHANGE UP
GLUCOSE SERPL-MCNC: 76 MG/DL — SIGNIFICANT CHANGE UP (ref 70–99)
GLUCOSE SERPL-MCNC: 76 MG/DL — SIGNIFICANT CHANGE UP (ref 70–99)
POTASSIUM SERPL-MCNC: 4.2 MMOL/L — SIGNIFICANT CHANGE UP (ref 3.5–5.3)
POTASSIUM SERPL-MCNC: 4.2 MMOL/L — SIGNIFICANT CHANGE UP (ref 3.5–5.3)
POTASSIUM SERPL-SCNC: 4.2 MMOL/L — SIGNIFICANT CHANGE UP (ref 3.5–5.3)
POTASSIUM SERPL-SCNC: 4.2 MMOL/L — SIGNIFICANT CHANGE UP (ref 3.5–5.3)
PROT SERPL-MCNC: 8.1 G/DL — SIGNIFICANT CHANGE UP (ref 6–8.3)
PROT SERPL-MCNC: 8.1 G/DL — SIGNIFICANT CHANGE UP (ref 6–8.3)
SODIUM SERPL-SCNC: 139 MMOL/L — SIGNIFICANT CHANGE UP (ref 135–145)
SODIUM SERPL-SCNC: 139 MMOL/L — SIGNIFICANT CHANGE UP (ref 135–145)

## 2023-11-08 PROCEDURE — 99214 OFFICE O/P EST MOD 30 MIN: CPT

## 2023-11-08 PROCEDURE — 99205 OFFICE O/P NEW HI 60 MIN: CPT

## 2023-11-08 RX ORDER — KETOCONAZOLE 20 MG/G
2 CREAM TOPICAL
Qty: 30 | Refills: 11 | Status: COMPLETED | COMMUNITY
Start: 2022-05-26 | End: 2023-11-08

## 2023-11-08 RX ORDER — DULOXETINE HYDROCHLORIDE 30 MG/1
30 CAPSULE, DELAYED RELEASE PELLETS ORAL
Qty: 30 | Refills: 0 | Status: COMPLETED | COMMUNITY
Start: 2022-08-17 | End: 2023-11-08

## 2023-11-08 RX ORDER — PREGABALIN 25 MG/1
25 CAPSULE ORAL
Qty: 60 | Refills: 0 | Status: COMPLETED | COMMUNITY
Start: 2022-07-12 | End: 2023-11-08

## 2023-11-08 RX ORDER — FAMOTIDINE 20 MG/1
20 TABLET, FILM COATED ORAL
Qty: 30 | Refills: 2 | Status: ACTIVE | COMMUNITY
Start: 2022-11-18 | End: 1900-01-01

## 2023-11-08 RX ORDER — PANTOPRAZOLE 40 MG/1
40 TABLET, DELAYED RELEASE ORAL DAILY
Qty: 30 | Refills: 2 | Status: ACTIVE | COMMUNITY
Start: 2022-11-18 | End: 1900-01-01

## 2023-11-08 RX ORDER — LETROZOLE TABLETS 2.5 MG/1
2.5 TABLET, FILM COATED ORAL DAILY
Qty: 30 | Refills: 3 | Status: ACTIVE | COMMUNITY
Start: 2022-02-15 | End: 1900-01-01

## 2023-11-08 RX ORDER — NAPROXEN 500 MG/1
500 TABLET ORAL TWICE DAILY
Qty: 30 | Refills: 0 | Status: COMPLETED | COMMUNITY
Start: 2022-05-27 | End: 2023-11-08

## 2023-11-08 RX ORDER — BENZOYL PEROXIDE 100 MG/ML
10 LIQUID TOPICAL DAILY
Qty: 1 | Refills: 11 | Status: COMPLETED | COMMUNITY
Start: 2022-11-02 | End: 2023-11-08

## 2023-11-08 RX ORDER — GABAPENTIN 300 MG/1
300 CAPSULE ORAL
Qty: 30 | Refills: 0 | Status: COMPLETED | COMMUNITY
End: 2023-11-08

## 2023-11-09 LAB
BASOPHILS # BLD AUTO: 0.05 K/UL — SIGNIFICANT CHANGE UP (ref 0–0.2)
BASOPHILS # BLD AUTO: 0.05 K/UL — SIGNIFICANT CHANGE UP (ref 0–0.2)
BASOPHILS NFR BLD AUTO: 0.5 % — SIGNIFICANT CHANGE UP (ref 0–2)
BASOPHILS NFR BLD AUTO: 0.5 % — SIGNIFICANT CHANGE UP (ref 0–2)
CANCER AG125 SERPL-ACNC: 7 U/ML — SIGNIFICANT CHANGE UP
CANCER AG125 SERPL-ACNC: 7 U/ML — SIGNIFICANT CHANGE UP
EOSINOPHIL # BLD AUTO: 0.11 K/UL — SIGNIFICANT CHANGE UP (ref 0–0.5)
EOSINOPHIL # BLD AUTO: 0.11 K/UL — SIGNIFICANT CHANGE UP (ref 0–0.5)
EOSINOPHIL NFR BLD AUTO: 1.1 % — SIGNIFICANT CHANGE UP (ref 0–6)
EOSINOPHIL NFR BLD AUTO: 1.1 % — SIGNIFICANT CHANGE UP (ref 0–6)
HCT VFR BLD CALC: 38.9 % — SIGNIFICANT CHANGE UP (ref 34.5–45)
HCT VFR BLD CALC: 38.9 % — SIGNIFICANT CHANGE UP (ref 34.5–45)
HGB BLD-MCNC: 12.1 G/DL — SIGNIFICANT CHANGE UP (ref 11.5–15.5)
HGB BLD-MCNC: 12.1 G/DL — SIGNIFICANT CHANGE UP (ref 11.5–15.5)
IANC: 5.25 K/UL — SIGNIFICANT CHANGE UP (ref 1.8–7.4)
IANC: 5.25 K/UL — SIGNIFICANT CHANGE UP (ref 1.8–7.4)
IMM GRANULOCYTES NFR BLD AUTO: 0.3 % — SIGNIFICANT CHANGE UP (ref 0–0.9)
IMM GRANULOCYTES NFR BLD AUTO: 0.3 % — SIGNIFICANT CHANGE UP (ref 0–0.9)
LYMPHOCYTES # BLD AUTO: 4.1 K/UL — HIGH (ref 1–3.3)
LYMPHOCYTES # BLD AUTO: 4.1 K/UL — HIGH (ref 1–3.3)
LYMPHOCYTES # BLD AUTO: 40.4 % — SIGNIFICANT CHANGE UP (ref 13–44)
LYMPHOCYTES # BLD AUTO: 40.4 % — SIGNIFICANT CHANGE UP (ref 13–44)
MCHC RBC-ENTMCNC: 27.8 PG — SIGNIFICANT CHANGE UP (ref 27–34)
MCHC RBC-ENTMCNC: 27.8 PG — SIGNIFICANT CHANGE UP (ref 27–34)
MCHC RBC-ENTMCNC: 31.1 GM/DL — LOW (ref 32–36)
MCHC RBC-ENTMCNC: 31.1 GM/DL — LOW (ref 32–36)
MCV RBC AUTO: 89.4 FL — SIGNIFICANT CHANGE UP (ref 80–100)
MCV RBC AUTO: 89.4 FL — SIGNIFICANT CHANGE UP (ref 80–100)
MONOCYTES # BLD AUTO: 0.6 K/UL — SIGNIFICANT CHANGE UP (ref 0–0.9)
MONOCYTES # BLD AUTO: 0.6 K/UL — SIGNIFICANT CHANGE UP (ref 0–0.9)
MONOCYTES NFR BLD AUTO: 5.9 % — SIGNIFICANT CHANGE UP (ref 2–14)
MONOCYTES NFR BLD AUTO: 5.9 % — SIGNIFICANT CHANGE UP (ref 2–14)
NEUTROPHILS # BLD AUTO: 5.25 K/UL — SIGNIFICANT CHANGE UP (ref 1.8–7.4)
NEUTROPHILS # BLD AUTO: 5.25 K/UL — SIGNIFICANT CHANGE UP (ref 1.8–7.4)
NEUTROPHILS NFR BLD AUTO: 51.8 % — SIGNIFICANT CHANGE UP (ref 43–77)
NEUTROPHILS NFR BLD AUTO: 51.8 % — SIGNIFICANT CHANGE UP (ref 43–77)
NRBC # BLD: 0 /100 WBCS — SIGNIFICANT CHANGE UP (ref 0–0)
NRBC # BLD: 0 /100 WBCS — SIGNIFICANT CHANGE UP (ref 0–0)
NRBC # FLD: 0 K/UL — SIGNIFICANT CHANGE UP (ref 0–0)
NRBC # FLD: 0 K/UL — SIGNIFICANT CHANGE UP (ref 0–0)
PLATELET # BLD AUTO: 267 K/UL — SIGNIFICANT CHANGE UP (ref 150–400)
PLATELET # BLD AUTO: 267 K/UL — SIGNIFICANT CHANGE UP (ref 150–400)
RBC # BLD: 4.35 M/UL — SIGNIFICANT CHANGE UP (ref 3.8–5.2)
RBC # BLD: 4.35 M/UL — SIGNIFICANT CHANGE UP (ref 3.8–5.2)
RBC # FLD: 13.3 % — SIGNIFICANT CHANGE UP (ref 10.3–14.5)
RBC # FLD: 13.3 % — SIGNIFICANT CHANGE UP (ref 10.3–14.5)
WBC # BLD: 10.14 K/UL — SIGNIFICANT CHANGE UP (ref 3.8–10.5)
WBC # BLD: 10.14 K/UL — SIGNIFICANT CHANGE UP (ref 3.8–10.5)
WBC # FLD AUTO: 10.14 K/UL — SIGNIFICANT CHANGE UP (ref 3.8–10.5)
WBC # FLD AUTO: 10.14 K/UL — SIGNIFICANT CHANGE UP (ref 3.8–10.5)

## 2023-11-10 LAB — BACTERIA UR CULT: NORMAL

## 2023-11-14 LAB
KIDNEY STONE SOURCE: NORMAL
NIDUS STONE QN: NORMAL
RESULT COMMENT: NORMAL

## 2023-11-20 ENCOUNTER — APPOINTMENT (OUTPATIENT)
Dept: GASTROENTEROLOGY | Facility: CLINIC | Age: 33
End: 2023-11-20
Payer: MEDICAID

## 2023-11-20 VITALS
RESPIRATION RATE: 15 BRPM | OXYGEN SATURATION: 98 % | WEIGHT: 209 LBS | DIASTOLIC BLOOD PRESSURE: 65 MMHG | BODY MASS INDEX: 39.46 KG/M2 | HEIGHT: 61 IN | SYSTOLIC BLOOD PRESSURE: 110 MMHG | TEMPERATURE: 98.2 F | HEART RATE: 98 BPM

## 2023-11-20 DIAGNOSIS — R14.0 ABDOMINAL DISTENSION (GASEOUS): ICD-10-CM

## 2023-11-20 DIAGNOSIS — R19.7 DIARRHEA, UNSPECIFIED: ICD-10-CM

## 2023-11-20 DIAGNOSIS — K59.00 CONSTIPATION, UNSPECIFIED: ICD-10-CM

## 2023-11-20 DIAGNOSIS — K62.5 HEMORRHAGE OF ANUS AND RECTUM: ICD-10-CM

## 2023-11-20 DIAGNOSIS — R10.9 UNSPECIFIED ABDOMINAL PAIN: ICD-10-CM

## 2023-11-20 PROCEDURE — 99204 OFFICE O/P NEW MOD 45 MIN: CPT

## 2023-11-20 RX ORDER — POLYETHYLENE GLYCOL 3350 AND ELECTROLYTES WITH LEMON FLAVOR 236; 22.74; 6.74; 5.86; 2.97 G/4L; G/4L; G/4L; G/4L; G/4L
236 POWDER, FOR SOLUTION ORAL
Qty: 1 | Refills: 0 | Status: ACTIVE | COMMUNITY
Start: 2023-11-20 | End: 1900-01-01

## 2023-11-27 ENCOUNTER — RESULT REVIEW (OUTPATIENT)
Age: 33
End: 2023-11-27

## 2023-11-27 ENCOUNTER — APPOINTMENT (OUTPATIENT)
Dept: ENDOVASCULAR SURGERY | Facility: CLINIC | Age: 33
End: 2023-11-27
Payer: MEDICAID

## 2023-11-27 VITALS
SYSTOLIC BLOOD PRESSURE: 123 MMHG | OXYGEN SATURATION: 97 % | DIASTOLIC BLOOD PRESSURE: 73 MMHG | WEIGHT: 210 LBS | HEIGHT: 61 IN | RESPIRATION RATE: 18 BRPM | HEART RATE: 73 BPM | TEMPERATURE: 97.3 F | BODY MASS INDEX: 39.65 KG/M2

## 2023-11-27 PROCEDURE — 77001 FLUOROGUIDE FOR VEIN DEVICE: CPT

## 2023-11-27 PROCEDURE — 36590 REMOVAL TUNNELED CV CATH: CPT | Mod: RT

## 2023-11-27 RX ORDER — FAMOTIDINE 10 MG/1
TABLET, FILM COATED ORAL
Refills: 0 | Status: ACTIVE | COMMUNITY

## 2023-12-04 ENCOUNTER — APPOINTMENT (OUTPATIENT)
Dept: INTERNAL MEDICINE | Facility: CLINIC | Age: 33
End: 2023-12-04
Payer: MEDICAID

## 2023-12-04 ENCOUNTER — NON-APPOINTMENT (OUTPATIENT)
Age: 33
End: 2023-12-04

## 2023-12-04 VITALS
BODY MASS INDEX: 40.02 KG/M2 | HEIGHT: 61 IN | OXYGEN SATURATION: 97 % | DIASTOLIC BLOOD PRESSURE: 83 MMHG | WEIGHT: 212 LBS | HEART RATE: 87 BPM | TEMPERATURE: 97.2 F | SYSTOLIC BLOOD PRESSURE: 117 MMHG

## 2023-12-04 DIAGNOSIS — L30.4 ERYTHEMA INTERTRIGO: ICD-10-CM

## 2023-12-04 DIAGNOSIS — E66.01 MORBID (SEVERE) OBESITY DUE TO EXCESS CALORIES: ICD-10-CM

## 2023-12-04 DIAGNOSIS — G62.9 POLYNEUROPATHY, UNSPECIFIED: ICD-10-CM

## 2023-12-04 DIAGNOSIS — G56.00 CARPAL TUNNEL SYNDROME, UNSPECIFIED UPPER LIMB: ICD-10-CM

## 2023-12-04 DIAGNOSIS — L30.9 DERMATITIS, UNSPECIFIED: ICD-10-CM

## 2023-12-04 LAB
CRP SERPL-MCNC: 9 MG/L
IGA SER QL IEP: 426 MG/DL
TTG IGA SER IA-ACNC: <1.2 U/ML
TTG IGA SER-ACNC: NEGATIVE
TTG IGG SER IA-ACNC: 1.2 U/ML
TTG IGG SER IA-ACNC: NEGATIVE

## 2023-12-04 PROCEDURE — 99395 PREV VISIT EST AGE 18-39: CPT | Mod: 25

## 2023-12-04 RX ORDER — KETOCONAZOLE 20 MG/G
2 CREAM TOPICAL TWICE DAILY
Qty: 1 | Refills: 0 | Status: ACTIVE | COMMUNITY
Start: 2023-12-04 | End: 1900-01-01

## 2023-12-05 PROBLEM — L30.4 INTERTRIGO: Status: ACTIVE | Noted: 2022-05-26

## 2023-12-05 PROBLEM — L30.9 DERMATITIS: Status: ACTIVE | Noted: 2023-07-13

## 2023-12-05 PROBLEM — E66.01 MORBID OBESITY WITH BMI OF 40.0-44.9, ADULT: Status: ACTIVE | Noted: 2023-12-05

## 2023-12-05 PROBLEM — G62.9 PERIPHERAL NEUROPATHY: Status: ACTIVE | Noted: 2023-12-05

## 2023-12-06 LAB
CHOLEST SERPL-MCNC: 150 MG/DL
HDLC SERPL-MCNC: 45 MG/DL
LDLC SERPL CALC-MCNC: 78 MG/DL
NONHDLC SERPL-MCNC: 105 MG/DL
TRIGL SERPL-MCNC: 160 MG/DL
TSH SERPL-ACNC: 1.81 UIU/ML
VIT B12 SERPL-MCNC: 332 PG/ML

## 2023-12-08 LAB
ESTIMATED AVERAGE GLUCOSE: 128 MG/DL
HBA1C MFR BLD HPLC: 6.1 %

## 2023-12-11 LAB — 7AC4, BILE ACID SYNTHESIS S: 70.9 NG/ML

## 2023-12-11 RX ORDER — CHOLESTYRAMINE 4 G/9G
4 POWDER, FOR SUSPENSION ORAL DAILY
Qty: 1 | Refills: 0 | Status: ACTIVE | COMMUNITY
Start: 2023-12-11 | End: 1900-01-01

## 2023-12-18 ENCOUNTER — APPOINTMENT (OUTPATIENT)
Dept: GYNECOLOGIC ONCOLOGY | Facility: CLINIC | Age: 33
End: 2023-12-18
Payer: MEDICAID

## 2023-12-18 VITALS
WEIGHT: 220 LBS | BODY MASS INDEX: 41.54 KG/M2 | HEIGHT: 61 IN | HEART RATE: 90 BPM | DIASTOLIC BLOOD PRESSURE: 84 MMHG | OXYGEN SATURATION: 97 % | SYSTOLIC BLOOD PRESSURE: 117 MMHG

## 2023-12-18 PROCEDURE — 99214 OFFICE O/P EST MOD 30 MIN: CPT

## 2023-12-18 NOTE — HISTORY OF PRESENT ILLNESS
[FreeTextEntry1] : GYN/Ref: Erica Christensen MD PCP: Shilpi Garcia MD  Ms. Pérez, 33 years old, referred by Amparo is s/p ex lap, LINCOLN-RSO, omentectomy and CUSA ablation and optimal tumor debulking for a Stage III low grade ovarian serous carcinoma on 7/6/21. Patient is moving to [place], wanted to have a visit prior to move. no new complaints, had Port removed.  Prior surgery: 6/15/21 - Robotic LSO, right ovarian cystectomy, pelvic washing and tumor debulking.   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  Patient is s/p recent cholecystectomy with Dr. Ruiz.  Intraop - no new findings, no masses noted residual omentum was clear   Started adjuvant carbo/taxol on 8/11/21 and completed her last C6 cycle inpatient due to reactions.    [employer] genetic panel: MSH6 [id number] (VUS)  c/o GI upset, worse after choly. patient will see GI  Labs 12/5/2023: Hemoglobin A1c = 6.1 11/8/2023:  = 7 7/26/2023:  = 7 1/18/23:   = 8   Imaging:   10/5/23 CT A/P:  OSBALDO  Last GYN Oncology Tumor Board in February, 2022 noted that patient has a persistent nodule along the greater curvature of the stomach and recommends an aromatase inhibitor, observation of nodule and repeat imaging in 3 months. Patient feeling well today, no pain, some neuropathy. She is sad about her father's death but feels she has support. Plans to return to school in August.

## 2023-12-18 NOTE — PHYSICAL EXAM
[Chaperone Present] : A chaperone was present in the examining room during all aspects of the physical examination [Absent] : Adnexa(ae): Absent [Normal] : Recto-Vaginal Exam: Normal [Fully active, able to carry on all pre-disease performance without restriction] : Status 0 - Fully active, able to carry on all pre-disease performance without restriction [de-identified] : smooth vag cuff, no lesions noted, no masses [de-identified] : normal sphincter tone, smooth rectovaginal septum, no cul-de-sac nodules.

## 2023-12-18 NOTE — DISCUSSION/SUMMARY
[FreeTextEntry1] : patient is clinically OSBALDO continue surveillance continue AI until 2/24 f/u 3 months - will see NAVEEN Villa when she returns from [place]. Otherwise will call her new insurance in [place] for new Gyn Onc recs. urged to continue surveillance with Gyn Onc

## 2023-12-20 ENCOUNTER — APPOINTMENT (OUTPATIENT)
Dept: GASTROENTEROLOGY | Facility: AMBULATORY SURGERY CENTER | Age: 33
End: 2023-12-20
Payer: MEDICAID

## 2023-12-20 ENCOUNTER — RESULT REVIEW (OUTPATIENT)
Age: 33
End: 2023-12-20

## 2023-12-20 PROCEDURE — 45380 COLONOSCOPY AND BIOPSY: CPT

## 2023-12-21 ENCOUNTER — NON-APPOINTMENT (OUTPATIENT)
Age: 33
End: 2023-12-21

## 2024-01-02 ENCOUNTER — NON-APPOINTMENT (OUTPATIENT)
Age: 34
End: 2024-01-02

## 2024-01-10 ENCOUNTER — APPOINTMENT (OUTPATIENT)
Dept: UROLOGY | Facility: CLINIC | Age: 34
End: 2024-01-10
Payer: MEDICAID

## 2024-01-10 DIAGNOSIS — N20.0 CALCULUS OF KIDNEY: ICD-10-CM

## 2024-01-10 PROCEDURE — 99442: CPT

## 2024-01-10 NOTE — HISTORY OF PRESENT ILLNESS
[None] : None [Home] : at home, [unfilled] , at the time of the visit. [Medical Office: ([facility])___] : at the medical office located in  [Verbal consent obtained from patient] : the patient, [unfilled] [FreeTextEntry1] :  33 yr old female presents to follow up with kidney stones. November 1, 2023- she woke up with urinary pressure and urinary retention. She also had flank pain. She to ER [facility]. Pt was discharged with Flomax, ondansetron and naproxen. She was able to pass the stone on Saturday 11/4/23. Pt has a history of kidney stones; first episode was about 10 years. Pt drinks about 100 ounces of water a day. Pt drinking about 8 ounces of Coke a day.  Passed kidney stone 11/2023 stone analysis 11/2023- 90 % COM and 10% COD [Dysuria] : no dysuria [Hematuria - Gross] : no gross hematuria

## 2024-01-10 NOTE — ASSESSMENT
[FreeTextEntry1] : Pt is unexpectedly moving to [place], [place].  She was not able to complete Renal US.   24 hr urine from 12/2023 was rejected [facility] for not reaching the lab in 96 hours  plan 1) Renal US in March when she returns to [place] 2) Repeat 24 hr urine x 2 while in [place]

## 2024-02-21 ENCOUNTER — RESULT REVIEW (OUTPATIENT)
Age: 34
End: 2024-02-21

## 2024-03-08 ENCOUNTER — OUTPATIENT (OUTPATIENT)
Dept: OUTPATIENT SERVICES | Facility: HOSPITAL | Age: 34
LOS: 1 days | Discharge: HOME | End: 2024-03-08

## 2024-03-08 DIAGNOSIS — C56.9 MALIGNANT NEOPLASM OF UNSPECIFIED OVARY: Chronic | ICD-10-CM

## 2024-03-08 DIAGNOSIS — Z98.890 OTHER SPECIFIED POSTPROCEDURAL STATES: Chronic | ICD-10-CM

## 2024-03-08 DIAGNOSIS — C56.9 MALIGNANT NEOPLASM OF UNSPECIFIED OVARY: ICD-10-CM

## 2024-03-13 PROBLEM — Z08 ENCOUNTER FOR FOLLOW-UP SURVEILLANCE OF OVARIAN CANCER: Status: ACTIVE | Noted: 2021-08-31

## 2024-03-18 ENCOUNTER — APPOINTMENT (OUTPATIENT)
Dept: GYNECOLOGIC ONCOLOGY | Facility: CLINIC | Age: 34
End: 2024-03-18
Payer: MEDICAID

## 2024-03-18 ENCOUNTER — APPOINTMENT (OUTPATIENT)
Dept: CT IMAGING | Facility: CLINIC | Age: 34
End: 2024-03-18
Payer: MEDICAID

## 2024-03-18 ENCOUNTER — APPOINTMENT (OUTPATIENT)
Dept: ULTRASOUND IMAGING | Facility: CLINIC | Age: 34
End: 2024-03-18
Payer: MEDICAID

## 2024-03-18 VITALS
OXYGEN SATURATION: 97 % | HEART RATE: 99 BPM | SYSTOLIC BLOOD PRESSURE: 131 MMHG | BODY MASS INDEX: 41.54 KG/M2 | DIASTOLIC BLOOD PRESSURE: 85 MMHG | WEIGHT: 220 LBS | HEIGHT: 61 IN

## 2024-03-18 DIAGNOSIS — Z08 ENCOUNTER FOR FOLLOW-UP EXAMINATION AFTER COMPLETED TREATMENT FOR MALIGNANT NEOPLASM: ICD-10-CM

## 2024-03-18 DIAGNOSIS — Z85.43 ENCOUNTER FOR FOLLOW-UP EXAMINATION AFTER COMPLETED TREATMENT FOR MALIGNANT NEOPLASM: ICD-10-CM

## 2024-03-18 PROCEDURE — 76770 US EXAM ABDO BACK WALL COMP: CPT

## 2024-03-18 PROCEDURE — 99214 OFFICE O/P EST MOD 30 MIN: CPT

## 2024-03-18 PROCEDURE — 74177 CT ABD & PELVIS W/CONTRAST: CPT

## 2024-03-18 NOTE — HISTORY OF PRESENT ILLNESS
[FreeTextEntry1] : GYN/Ref: Erica Christensen MD PCP: Shilpi Garcia MD  Ms. 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 Patient is moved to [place].  She is here for her surveillance visit.  Will also have imaging done this afternoon.  She has not found a GYN Oncologist yet in [place].  For now, she will travel for her surveillance visits but if anything changes, she will let us know.   She is anticipating if scans are good and lab work good that Dr. Lopez that will determine if she stays on Letrozole.    Prior surgery: 6/15/21 - Robotic LSO, right ovarian cystectomy, pelvic washing and tumor debulking.   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  Patient is s/p recent cholecystectomy with Dr. Ruiz.  Intraop - no new findings, no masses noted residual omentum was clear   Started adjuvant carbo/taxol on 8/11/21 and completed her last C6 cycle inpatient due to reactions.    [employer] genetic panel: MSH6 [id number]>C (VUS)  c/o GI upset, worse after choly. patient will see GI.  Still has GI upset only when she eats pork.    Labs 12/5/2023: Hemoglobin A1c = 6.1 11/8/2023:  = 7 7/26/2023:  = 7 1/18/23:   = 8   Imaging:   10/5/23 CT A/P:  OSBALDO  Last GYN Oncology Tumor Board in February, 2022 noted that patient has a persistent nodule along the greater curvature of the stomach and recommends an aromatase inhibitor, observation of nodule and repeat imaging in 3 months. Patient feeling well today, no pain, some neuropathy. She is sad about her father's death but feels she has support. Plans to return to school in August.  Health Maintenance: Mammogram: Colonoscopy: 1/2/2024 neg PAP:

## 2024-03-18 NOTE — DISCUSSION/SUMMARY
[FreeTextEntry1] : patient is clinically OSBALDO  today.  continue surveillance - next visit with Dr. Fraire in 6 mos.  No new GYN Oncologist yet in [place].   She will advise if she does have a new Gyn Onc in [place] prior to September, 2024 visit.   still taking AI pending lab work, imaging.  Imaging is today.   Pt verbalized understanding of plan and agrees. All questions answered.

## 2024-03-18 NOTE — PHYSICAL EXAM
[Chaperone Present] : A chaperone was present in the examining room during all aspects of the physical examination [Absent] : Adnexa(ae): Absent [Normal] : Recto-Vaginal Exam: Normal [Fully active, able to carry on all pre-disease performance without restriction] : Status 0 - Fully active, able to carry on all pre-disease performance without restriction [de-identified] : smooth vag cuff, no lesions noted, no masses [de-identified] : normal sphincter tone, smooth rectovaginal septum, no cul-de-sac nodules.

## 2024-03-19 LAB — CANCER AG125 SERPL-ACNC: 7 U/ML

## 2024-04-01 ENCOUNTER — APPOINTMENT (OUTPATIENT)
Dept: DERMATOLOGY | Facility: CLINIC | Age: 34
End: 2024-04-01

## 2024-04-10 ENCOUNTER — APPOINTMENT (OUTPATIENT)
Dept: HEMATOLOGY ONCOLOGY | Facility: CLINIC | Age: 34
End: 2024-04-10
Payer: MEDICAID

## 2024-04-10 DIAGNOSIS — C56.3 MALIGNANT NEOPLASM OF BILATERAL OVARIES: ICD-10-CM

## 2024-04-10 PROCEDURE — 99213 OFFICE O/P EST LOW 20 MIN: CPT

## 2024-04-10 NOTE — ASSESSMENT
[FreeTextEntry1] : 35yo w/ hx of IIIB LGSOC s/p surgical staging and adjuvant C/T now on maintenance Letrozole.  OSBALDO on most recent exam Otherwise feeling well Plan to d/c Letrozole after 2 years of maintenance given normal scans and labs Plan for continued surveillance, f/u w/ Dr. Fraire for surveillance exam 9/16/24 Cont f/u q6months, in person for next visit in September All questions answered

## 2024-05-21 PROBLEM — C56.3 OVARIAN CANCER, BILATERAL: Status: ACTIVE | Noted: 2021-06-23

## 2024-05-21 NOTE — REVIEW OF SYSTEMS
[Recent Change In Weight] : ~T no recent weight change [SOB on Exertion] : no shortness of breath during exertion [Abdominal Pain] : abdominal pain [Vomiting] : no vomiting [Constipation] : constipation [Difficulty Walking] : no difficulty walking [Negative] : Allergic/Immunologic [FreeTextEntry7] : diarrhea [FreeTextEntry9] : b/l heel pain/neuropathy

## 2024-05-21 NOTE — HISTORY OF PRESENT ILLNESS
[Disease: _____________________] : Disease: [unfilled] [AJCC Stage: ____] : AJCC Stage: [unfilled] [de-identified] : Referred by Dr. Magalys Fraire\par  \par  Ms. 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  \par  \par  Pt. states she developed abdominal pain about a year ago. She presented to GYN in [place] and was advised she had a cyst on each ovary. She was prescribed birth control and advised to follow up in 3 months. She followed up in 3 months and was advised that cysts had become larger but there was nothing to do at that time and that should she experience severe pain to follow up with ER because this could signify cyst rupture. Patient decided to stop birth control pills because she was experiencing migraines, which stopped after stopping meds. \par  \par  Patient states she did go to the  ER in [place] because of severe pain and workup was normal and was discharged. She then traveled to [facility] in February and presented to ED there for severe pain. She was advised she had endometriosis and advised to follow up outpatient. Her insurance did not cover her primary GYN and was then referred to Dr. Christensen. \par  \par  She was subsequently referred to Dr. Fraire for gyn onc evaluation and underwent RA-laparoscopic LSO and R ovarian cystectomy on 6/15/21, followed by exp lap, LINCOLN-RSO, omentectomy, and optimal tumor debulking surgery (7/6/21). \par  \par  Final pathology:\par  Final Diagnosis\par  1. Uterus, cervix and right ovary and fallopian tube (total hysterectomy and salpingo-oophorectomy):\par  - Focal low-grade micropapillary serous carcinoma (best seen on slide 1O) arising in a borderline tumor of the ovary.\par  - The overall size of the ovarian tumor is 2.5 x 2.0 x 1.2 cm.\par  - Involvement of the ovarian surface by borderline tumor is identified.\par  - Small invasive implants involving the ovary (slides 1M-O), uterine serosa (slide 1G), and the fallopian tube.\par  - Chronic cervicitis and squamous metaplasia.\par  - Secretory endometrium.\par  - Uterine serosal adhesion and tubo-ovarian adhesion.\par  - Post-surgery changes of the ovary.\par  - Benign paratubal cysts.\par  \par  2. Infragastric omentum (omentectomy):\par  - Multiple noninvasive implants.\par  - One (1) omental lymph node, negative for tumor.\par  - Focal endosalpingiosis.\par  \par  GYN Oncology Tumor Board Consensus on 7/12/21: Clinical Trial, BRCA testing\par  Diagnosis: Stage IIIB low grade serous ovarian carcinoma.\par  \par  8/11/2021: Started adjuvant carbo/taxol  [de-identified] : [employer] genetic panel: MSH6 [id number]>C (VUS) [FreeTextEntry1] : surgery (7/6/21)  --> Adjuvant Carbo/Taxol started 8/11/2021 s/p cycle 6/6 [Home] : at home, [unfilled] , at the time of the visit. [Medical Office: ([facility])___] : at the medical office located in  [Verbal consent obtained from patient] : the patient, [unfilled] [de-identified] : Patient presents for routine surveillance visit for LGSOC on maintenance Letrozole.  Had CT scans done on 3/18/24 - OSBALDO In the interim, she was seen by gyn onc, exam normal.  She has a good appetite, and has regular BMs. No vaginal bleeding or discharge. She continues to use the medical cannabis for occasional muscle cramping in b/l hands.  Denies fever, chills, mouth sores, CP, palpitations, cough, HOWARD, n/v, LE edema, urinary symptoms, excessive bruising, dizziness, headaches, arthralgias, myalgias.

## 2024-07-29 ENCOUNTER — RX RENEWAL (OUTPATIENT)
Age: 34
End: 2024-07-29

## 2024-09-16 ENCOUNTER — APPOINTMENT (OUTPATIENT)
Dept: HEMATOLOGY ONCOLOGY | Facility: CLINIC | Age: 34
End: 2024-09-16

## 2024-09-16 ENCOUNTER — APPOINTMENT (OUTPATIENT)
Dept: GYNECOLOGIC ONCOLOGY | Facility: CLINIC | Age: 34
End: 2024-09-16

## 2024-10-30 NOTE — ASU PATIENT PROFILE, ADULT - DOMESTIC TRAVEL HIGH RISK QUESTION
No. LAKSHMI screening performed.  STOP BANG Legend: 0-2 = LOW Risk; 3-4 = INTERMEDIATE Risk; 5-8 = HIGH Risk
No

## (undated) DEVICE — SYR LUER LOK 20CC

## (undated) DEVICE — D HELP - CLEARVIEW CLEARIFY SYSTEM

## (undated) DEVICE — WARMING BLANKET UPPER ADULT

## (undated) DEVICE — PROTECTOR HEEL / ELBOW

## (undated) DEVICE — TUBING INSUFFLATION LAP FILTER 10FT

## (undated) DEVICE — TUBING OLYMPUS INSUFFLATION

## (undated) DEVICE — CANISTER DISPOSABLE THIN WALL 3000CC

## (undated) DEVICE — ELCTR GROUNDING PAD ADULT COVIDIEN

## (undated) DEVICE — DRAPE TOWEL BLUE 17" X 24"

## (undated) DEVICE — PACK GENERAL LAPAROSCOPY

## (undated) DEVICE — DRAPE 3/4 SHEET 52X76"

## (undated) DEVICE — POSITIONER STRAP ARMBOARD VELCRO TS-30

## (undated) DEVICE — TIP METZENBAUM SCISSOR MONOPOLAR ENDOCUT (ORANGE)

## (undated) DEVICE — ELCTR BOVIE TIP BLADE INSULATED 2.75" EDGE

## (undated) DEVICE — BLADE SURGICAL #15 CARBON

## (undated) DEVICE — SUT VICRYL 0 27" UR-6

## (undated) DEVICE — SUT MONOCRYL 4-0 27" PS-2 UNDYED

## (undated) DEVICE — SOL IRR POUR H2O 500ML

## (undated) DEVICE — GLV 7.5 PROTEXIS (WHITE)

## (undated) DEVICE — BASIN SET SINGLE

## (undated) DEVICE — DRSG STERISTRIPS 0.5 X 4"

## (undated) DEVICE — DRAPE LAPAROSCOPIC CHOLECYSTECTOMY

## (undated) DEVICE — DISSECTOR ENDOSCOPIC KITTNER SINGLE TIP

## (undated) DEVICE — TROCAR COVIDIEN VERSAPORT BLADELESS OPTICAL 5MM STANDARD

## (undated) DEVICE — TROCAR COVIDIEN BLUNT TIP HASSAN 10MM

## (undated) DEVICE — SOL IRR POUR NS 0.9% 1000ML

## (undated) DEVICE — ENDOCATCH 10MM SPECIMEN POUCH

## (undated) DEVICE — VENODYNE/SCD SLEEVE CALF MEDIUM